# Patient Record
Sex: FEMALE | Race: BLACK OR AFRICAN AMERICAN | NOT HISPANIC OR LATINO
[De-identification: names, ages, dates, MRNs, and addresses within clinical notes are randomized per-mention and may not be internally consistent; named-entity substitution may affect disease eponyms.]

---

## 2017-01-10 ENCOUNTER — APPOINTMENT (OUTPATIENT)
Dept: NEPHROLOGY | Facility: CLINIC | Age: 77
End: 2017-01-10

## 2017-01-10 VITALS
HEART RATE: 80 BPM | SYSTOLIC BLOOD PRESSURE: 136 MMHG | HEIGHT: 61 IN | BODY MASS INDEX: 38.51 KG/M2 | DIASTOLIC BLOOD PRESSURE: 83 MMHG | WEIGHT: 204 LBS

## 2017-01-17 ENCOUNTER — APPOINTMENT (OUTPATIENT)
Dept: PODIATRY | Facility: CLINIC | Age: 77
End: 2017-01-17

## 2017-01-18 ENCOUNTER — RESULT REVIEW (OUTPATIENT)
Age: 77
End: 2017-01-18

## 2017-01-19 ENCOUNTER — APPOINTMENT (OUTPATIENT)
Dept: UROGYNECOLOGY | Facility: CLINIC | Age: 77
End: 2017-01-19

## 2017-01-19 VITALS
HEIGHT: 61 IN | BODY MASS INDEX: 38.14 KG/M2 | SYSTOLIC BLOOD PRESSURE: 130 MMHG | DIASTOLIC BLOOD PRESSURE: 78 MMHG | WEIGHT: 202 LBS

## 2017-01-19 LAB — CYTOLOGY CVX/VAG DOC THIN PREP: NORMAL

## 2017-01-19 RX ORDER — TROSPIUM CHLORIDE 20 MG/1
20 TABLET, FILM COATED ORAL
Qty: 180 | Refills: 0 | Status: DISCONTINUED | COMMUNITY
Start: 2017-01-19 | End: 2017-01-19

## 2017-01-20 LAB
APPEARANCE UR: NORMAL
BACTERIA URNS QL MICRO: ABNORMAL
BILIRUB UR QL STRIP: NEGATIVE
COLOR UR: YELLOW
GLUCOSE UR STRIP-MCNC: 100 MG/DL
HGB UR QL STRIP: NEGATIVE
KETONES UR STRIP-MCNC: NEGATIVE MG/DL
NITRITE UR QL STRIP: NEGATIVE
PH UR STRIP: 6.5
PROT UR STRIP-MCNC: >=300 MG/DL
SP GR UR STRIP: 1.02
URINE COMP/EPITH (NORTH): ABNORMAL
UROBILINOGEN UR STRIP-MCNC: 0.2 MG/DL
WBC URNS QL MICRO: NEGATIVE

## 2017-01-24 LAB — BACTERIA UR CULT: NORMAL

## 2017-03-02 ENCOUNTER — APPOINTMENT (OUTPATIENT)
Dept: UROGYNECOLOGY | Facility: CLINIC | Age: 77
End: 2017-03-02

## 2017-03-09 ENCOUNTER — APPOINTMENT (OUTPATIENT)
Dept: ENDOCRINOLOGY | Facility: CLINIC | Age: 77
End: 2017-03-09

## 2017-03-09 VITALS — WEIGHT: 196 LBS | BODY MASS INDEX: 31.5 KG/M2 | HEIGHT: 66 IN

## 2017-03-16 ENCOUNTER — APPOINTMENT (OUTPATIENT)
Dept: UROGYNECOLOGY | Facility: CLINIC | Age: 77
End: 2017-03-16

## 2017-03-16 VITALS — SYSTOLIC BLOOD PRESSURE: 130 MMHG | DIASTOLIC BLOOD PRESSURE: 78 MMHG

## 2017-03-21 ENCOUNTER — APPOINTMENT (OUTPATIENT)
Dept: PODIATRY | Facility: CLINIC | Age: 77
End: 2017-03-21

## 2017-03-29 ENCOUNTER — RX RENEWAL (OUTPATIENT)
Age: 77
End: 2017-03-29

## 2017-04-06 ENCOUNTER — APPOINTMENT (OUTPATIENT)
Dept: PODIATRY | Facility: CLINIC | Age: 77
End: 2017-04-06

## 2017-04-06 VITALS — HEIGHT: 66 IN | WEIGHT: 196 LBS | BODY MASS INDEX: 31.5 KG/M2

## 2017-04-18 ENCOUNTER — APPOINTMENT (OUTPATIENT)
Dept: NEPHROLOGY | Facility: CLINIC | Age: 77
End: 2017-04-18

## 2017-04-18 VITALS
HEIGHT: 66 IN | WEIGHT: 201 LBS | SYSTOLIC BLOOD PRESSURE: 153 MMHG | HEART RATE: 76 BPM | DIASTOLIC BLOOD PRESSURE: 80 MMHG | BODY MASS INDEX: 32.3 KG/M2

## 2017-04-19 ENCOUNTER — RESULT REVIEW (OUTPATIENT)
Age: 77
End: 2017-04-19

## 2017-04-21 ENCOUNTER — OTHER (OUTPATIENT)
Age: 77
End: 2017-04-21

## 2017-06-05 ENCOUNTER — RX RENEWAL (OUTPATIENT)
Age: 77
End: 2017-06-05

## 2017-06-15 ENCOUNTER — APPOINTMENT (OUTPATIENT)
Dept: ENDOCRINOLOGY | Facility: CLINIC | Age: 77
End: 2017-06-15

## 2017-06-15 VITALS
DIASTOLIC BLOOD PRESSURE: 76 MMHG | HEART RATE: 75 BPM | WEIGHT: 215 LBS | BODY MASS INDEX: 36.7 KG/M2 | HEIGHT: 64 IN | SYSTOLIC BLOOD PRESSURE: 124 MMHG

## 2017-06-15 DIAGNOSIS — E66.9 OBESITY, UNSPECIFIED: ICD-10-CM

## 2017-06-19 ENCOUNTER — OUTPATIENT (OUTPATIENT)
Dept: OUTPATIENT SERVICES | Facility: HOSPITAL | Age: 77
LOS: 1 days | Discharge: HOME | End: 2017-06-19

## 2017-06-19 ENCOUNTER — APPOINTMENT (OUTPATIENT)
Dept: PODIATRY | Facility: CLINIC | Age: 77
End: 2017-06-19

## 2017-06-19 VITALS
DIASTOLIC BLOOD PRESSURE: 77 MMHG | BODY MASS INDEX: 39.27 KG/M2 | HEIGHT: 60 IN | HEART RATE: 76 BPM | WEIGHT: 200 LBS | SYSTOLIC BLOOD PRESSURE: 130 MMHG

## 2017-06-19 DIAGNOSIS — J45.901 UNSPECIFIED ASTHMA WITH (ACUTE) EXACERBATION: ICD-10-CM

## 2017-06-19 DIAGNOSIS — M17.10 UNILATERAL PRIMARY OSTEOARTHRITIS, UNSPECIFIED KNEE: ICD-10-CM

## 2017-06-19 DIAGNOSIS — E87.5 HYPERKALEMIA: ICD-10-CM

## 2017-06-19 DIAGNOSIS — M70.70 OTHER BURSITIS OF HIP, UNSPECIFIED HIP: ICD-10-CM

## 2017-06-19 DIAGNOSIS — M19.90 UNSPECIFIED OSTEOARTHRITIS, UNSPECIFIED SITE: ICD-10-CM

## 2017-06-23 ENCOUNTER — APPOINTMENT (OUTPATIENT)
Dept: PODIATRY | Facility: CLINIC | Age: 77
End: 2017-06-23

## 2017-06-28 DIAGNOSIS — M79.671 PAIN IN RIGHT FOOT: ICD-10-CM

## 2017-06-28 DIAGNOSIS — B35.1 TINEA UNGUIUM: ICD-10-CM

## 2017-06-28 DIAGNOSIS — L84 CORNS AND CALLOSITIES: ICD-10-CM

## 2017-06-28 DIAGNOSIS — M79.672 PAIN IN LEFT FOOT: ICD-10-CM

## 2017-06-28 DIAGNOSIS — E11.8 TYPE 2 DIABETES MELLITUS WITH UNSPECIFIED COMPLICATIONS: ICD-10-CM

## 2017-06-28 DIAGNOSIS — L85.3 XEROSIS CUTIS: ICD-10-CM

## 2017-07-10 ENCOUNTER — EMERGENCY (EMERGENCY)
Facility: HOSPITAL | Age: 77
LOS: 0 days | Discharge: HOME | End: 2017-07-10
Admitting: INTERNAL MEDICINE

## 2017-07-10 DIAGNOSIS — R21 RASH AND OTHER NONSPECIFIC SKIN ERUPTION: ICD-10-CM

## 2017-07-10 DIAGNOSIS — M17.10 UNILATERAL PRIMARY OSTEOARTHRITIS, UNSPECIFIED KNEE: ICD-10-CM

## 2017-07-10 DIAGNOSIS — I25.10 ATHEROSCLEROTIC HEART DISEASE OF NATIVE CORONARY ARTERY WITHOUT ANGINA PECTORIS: ICD-10-CM

## 2017-07-10 DIAGNOSIS — Z79.4 LONG TERM (CURRENT) USE OF INSULIN: ICD-10-CM

## 2017-07-10 DIAGNOSIS — E87.5 HYPERKALEMIA: ICD-10-CM

## 2017-07-10 DIAGNOSIS — M70.70 OTHER BURSITIS OF HIP, UNSPECIFIED HIP: ICD-10-CM

## 2017-07-10 DIAGNOSIS — Z90.49 ACQUIRED ABSENCE OF OTHER SPECIFIED PARTS OF DIGESTIVE TRACT: ICD-10-CM

## 2017-07-10 DIAGNOSIS — Z96.651 PRESENCE OF RIGHT ARTIFICIAL KNEE JOINT: ICD-10-CM

## 2017-07-10 DIAGNOSIS — J45.901 UNSPECIFIED ASTHMA WITH (ACUTE) EXACERBATION: ICD-10-CM

## 2017-07-10 DIAGNOSIS — B02.9 ZOSTER WITHOUT COMPLICATIONS: ICD-10-CM

## 2017-07-10 DIAGNOSIS — J45.909 UNSPECIFIED ASTHMA, UNCOMPLICATED: ICD-10-CM

## 2017-07-10 DIAGNOSIS — M19.90 UNSPECIFIED OSTEOARTHRITIS, UNSPECIFIED SITE: ICD-10-CM

## 2017-07-10 DIAGNOSIS — E11.9 TYPE 2 DIABETES MELLITUS WITHOUT COMPLICATIONS: ICD-10-CM

## 2017-07-12 ENCOUNTER — OTHER (OUTPATIENT)
Age: 77
End: 2017-07-12

## 2017-07-14 ENCOUNTER — APPOINTMENT (OUTPATIENT)
Dept: UROGYNECOLOGY | Facility: CLINIC | Age: 77
End: 2017-07-14

## 2017-07-14 ENCOUNTER — OUTPATIENT (OUTPATIENT)
Dept: OUTPATIENT SERVICES | Facility: HOSPITAL | Age: 77
LOS: 1 days | Discharge: HOME | End: 2017-07-14

## 2017-07-14 DIAGNOSIS — E87.5 HYPERKALEMIA: ICD-10-CM

## 2017-07-14 DIAGNOSIS — M70.70 OTHER BURSITIS OF HIP, UNSPECIFIED HIP: ICD-10-CM

## 2017-07-14 DIAGNOSIS — E11.9 TYPE 2 DIABETES MELLITUS WITHOUT COMPLICATIONS: ICD-10-CM

## 2017-07-14 DIAGNOSIS — Z00.00 ENCOUNTER FOR GENERAL ADULT MEDICAL EXAMINATION WITHOUT ABNORMAL FINDINGS: ICD-10-CM

## 2017-07-14 DIAGNOSIS — M17.10 UNILATERAL PRIMARY OSTEOARTHRITIS, UNSPECIFIED KNEE: ICD-10-CM

## 2017-07-14 DIAGNOSIS — M19.90 UNSPECIFIED OSTEOARTHRITIS, UNSPECIFIED SITE: ICD-10-CM

## 2017-07-14 DIAGNOSIS — J45.901 UNSPECIFIED ASTHMA WITH (ACUTE) EXACERBATION: ICD-10-CM

## 2017-07-19 ENCOUNTER — APPOINTMENT (OUTPATIENT)
Dept: INTERNAL MEDICINE | Facility: CLINIC | Age: 77
End: 2017-07-19

## 2017-07-19 ENCOUNTER — RESULT REVIEW (OUTPATIENT)
Age: 77
End: 2017-07-19

## 2017-07-19 ENCOUNTER — OUTPATIENT (OUTPATIENT)
Dept: OUTPATIENT SERVICES | Facility: HOSPITAL | Age: 77
LOS: 1 days | Discharge: HOME | End: 2017-07-19

## 2017-07-19 VITALS
BODY MASS INDEX: 38.87 KG/M2 | HEART RATE: 71 BPM | HEIGHT: 60 IN | DIASTOLIC BLOOD PRESSURE: 56 MMHG | WEIGHT: 198 LBS | SYSTOLIC BLOOD PRESSURE: 84 MMHG

## 2017-07-19 DIAGNOSIS — Z87.898 PERSONAL HISTORY OF OTHER SPECIFIED CONDITIONS: ICD-10-CM

## 2017-07-19 DIAGNOSIS — G89.29 PAIN IN RIGHT FOOT: ICD-10-CM

## 2017-07-19 DIAGNOSIS — E87.5 HYPERKALEMIA: ICD-10-CM

## 2017-07-19 DIAGNOSIS — J45.901 UNSPECIFIED ASTHMA WITH (ACUTE) EXACERBATION: ICD-10-CM

## 2017-07-19 DIAGNOSIS — M79.671 PAIN IN RIGHT FOOT: ICD-10-CM

## 2017-07-19 DIAGNOSIS — M70.70 OTHER BURSITIS OF HIP, UNSPECIFIED HIP: ICD-10-CM

## 2017-07-19 DIAGNOSIS — M19.90 UNSPECIFIED OSTEOARTHRITIS, UNSPECIFIED SITE: ICD-10-CM

## 2017-07-19 DIAGNOSIS — M17.10 UNILATERAL PRIMARY OSTEOARTHRITIS, UNSPECIFIED KNEE: ICD-10-CM

## 2017-07-19 LAB — GLUCOSE SERPL-MCNC: 249 MG/DL

## 2017-07-20 ENCOUNTER — RX RENEWAL (OUTPATIENT)
Age: 77
End: 2017-07-20

## 2017-07-20 DIAGNOSIS — I10 ESSENTIAL (PRIMARY) HYPERTENSION: ICD-10-CM

## 2017-07-20 DIAGNOSIS — B02.21 POSTHERPETIC GENICULATE GANGLIONITIS: ICD-10-CM

## 2017-07-20 DIAGNOSIS — E11.9 TYPE 2 DIABETES MELLITUS WITHOUT COMPLICATIONS: ICD-10-CM

## 2017-07-20 DIAGNOSIS — N18.3 CHRONIC KIDNEY DISEASE, STAGE 3 (MODERATE): ICD-10-CM

## 2017-07-25 ENCOUNTER — APPOINTMENT (OUTPATIENT)
Dept: NEPHROLOGY | Facility: CLINIC | Age: 77
End: 2017-07-25

## 2017-07-25 ENCOUNTER — OUTPATIENT (OUTPATIENT)
Dept: OUTPATIENT SERVICES | Facility: HOSPITAL | Age: 77
LOS: 1 days | Discharge: HOME | End: 2017-07-25

## 2017-07-25 ENCOUNTER — EMERGENCY (EMERGENCY)
Facility: HOSPITAL | Age: 77
LOS: 0 days | Discharge: HOME | End: 2017-07-25
Admitting: INTERNAL MEDICINE

## 2017-07-25 VITALS
BODY MASS INDEX: 38.87 KG/M2 | HEIGHT: 60 IN | SYSTOLIC BLOOD PRESSURE: 147 MMHG | DIASTOLIC BLOOD PRESSURE: 78 MMHG | WEIGHT: 198 LBS | HEART RATE: 78 BPM

## 2017-07-25 DIAGNOSIS — N18.9 CHRONIC KIDNEY DISEASE, UNSPECIFIED: ICD-10-CM

## 2017-07-25 DIAGNOSIS — J45.909 UNSPECIFIED ASTHMA, UNCOMPLICATED: ICD-10-CM

## 2017-07-25 DIAGNOSIS — I12.9 HYPERTENSIVE CHRONIC KIDNEY DISEASE WITH STAGE 1 THROUGH STAGE 4 CHRONIC KIDNEY DISEASE, OR UNSPECIFIED CHRONIC KIDNEY DISEASE: ICD-10-CM

## 2017-07-25 DIAGNOSIS — J45.901 UNSPECIFIED ASTHMA WITH (ACUTE) EXACERBATION: ICD-10-CM

## 2017-07-25 DIAGNOSIS — M19.90 UNSPECIFIED OSTEOARTHRITIS, UNSPECIFIED SITE: ICD-10-CM

## 2017-07-25 DIAGNOSIS — E87.5 HYPERKALEMIA: ICD-10-CM

## 2017-07-25 DIAGNOSIS — M17.10 UNILATERAL PRIMARY OSTEOARTHRITIS, UNSPECIFIED KNEE: ICD-10-CM

## 2017-07-25 DIAGNOSIS — E11.9 TYPE 2 DIABETES MELLITUS WITHOUT COMPLICATIONS: ICD-10-CM

## 2017-07-25 DIAGNOSIS — Z90.49 ACQUIRED ABSENCE OF OTHER SPECIFIED PARTS OF DIGESTIVE TRACT: ICD-10-CM

## 2017-07-25 DIAGNOSIS — R10.31 RIGHT LOWER QUADRANT PAIN: ICD-10-CM

## 2017-07-25 DIAGNOSIS — I25.10 ATHEROSCLEROTIC HEART DISEASE OF NATIVE CORONARY ARTERY WITHOUT ANGINA PECTORIS: ICD-10-CM

## 2017-07-25 DIAGNOSIS — M70.70 OTHER BURSITIS OF HIP, UNSPECIFIED HIP: ICD-10-CM

## 2017-07-25 DIAGNOSIS — Z79.899 OTHER LONG TERM (CURRENT) DRUG THERAPY: ICD-10-CM

## 2017-07-25 DIAGNOSIS — E78.5 HYPERLIPIDEMIA, UNSPECIFIED: ICD-10-CM

## 2017-07-28 ENCOUNTER — OUTPATIENT (OUTPATIENT)
Dept: OUTPATIENT SERVICES | Facility: HOSPITAL | Age: 77
LOS: 1 days | Discharge: HOME | End: 2017-07-28

## 2017-07-28 ENCOUNTER — APPOINTMENT (OUTPATIENT)
Dept: INTERNAL MEDICINE | Facility: CLINIC | Age: 77
End: 2017-07-28

## 2017-07-28 VITALS
DIASTOLIC BLOOD PRESSURE: 85 MMHG | SYSTOLIC BLOOD PRESSURE: 147 MMHG | WEIGHT: 197 LBS | BODY MASS INDEX: 38.68 KG/M2 | HEIGHT: 60 IN | HEART RATE: 80 BPM

## 2017-07-28 DIAGNOSIS — E87.5 HYPERKALEMIA: ICD-10-CM

## 2017-07-28 DIAGNOSIS — N64.4 MASTODYNIA: ICD-10-CM

## 2017-07-28 DIAGNOSIS — I63.9 CEREBRAL INFARCTION, UNSPECIFIED: ICD-10-CM

## 2017-07-28 DIAGNOSIS — Z78.9 OTHER SPECIFIED HEALTH STATUS: ICD-10-CM

## 2017-07-28 DIAGNOSIS — M17.10 UNILATERAL PRIMARY OSTEOARTHRITIS, UNSPECIFIED KNEE: ICD-10-CM

## 2017-07-28 DIAGNOSIS — J45.901 UNSPECIFIED ASTHMA WITH (ACUTE) EXACERBATION: ICD-10-CM

## 2017-07-28 DIAGNOSIS — M70.70 OTHER BURSITIS OF HIP, UNSPECIFIED HIP: ICD-10-CM

## 2017-07-28 DIAGNOSIS — M19.90 UNSPECIFIED OSTEOARTHRITIS, UNSPECIFIED SITE: ICD-10-CM

## 2017-07-28 RX ORDER — GABAPENTIN 300 MG/1
300 CAPSULE ORAL TWICE DAILY
Qty: 60 | Refills: 0 | Status: DISCONTINUED | COMMUNITY
Start: 2017-07-19 | End: 2017-07-28

## 2017-07-31 ENCOUNTER — APPOINTMENT (OUTPATIENT)
Dept: INTERNAL MEDICINE | Facility: CLINIC | Age: 77
End: 2017-07-31

## 2017-08-01 DIAGNOSIS — N18.3 CHRONIC KIDNEY DISEASE, STAGE 3 (MODERATE): ICD-10-CM

## 2017-08-01 DIAGNOSIS — B02.9 ZOSTER WITHOUT COMPLICATIONS: ICD-10-CM

## 2017-08-01 DIAGNOSIS — I10 ESSENTIAL (PRIMARY) HYPERTENSION: ICD-10-CM

## 2017-08-01 DIAGNOSIS — M17.0 BILATERAL PRIMARY OSTEOARTHRITIS OF KNEE: ICD-10-CM

## 2017-08-01 DIAGNOSIS — E11.29 TYPE 2 DIABETES MELLITUS WITH OTHER DIABETIC KIDNEY COMPLICATION: ICD-10-CM

## 2017-08-14 ENCOUNTER — APPOINTMENT (OUTPATIENT)
Dept: PODIATRY | Facility: CLINIC | Age: 77
End: 2017-08-14

## 2017-08-14 ENCOUNTER — OUTPATIENT (OUTPATIENT)
Dept: OUTPATIENT SERVICES | Facility: HOSPITAL | Age: 77
LOS: 1 days | Discharge: HOME | End: 2017-08-14

## 2017-08-14 VITALS
BODY MASS INDEX: 39.07 KG/M2 | DIASTOLIC BLOOD PRESSURE: 77 MMHG | WEIGHT: 199 LBS | HEIGHT: 60 IN | SYSTOLIC BLOOD PRESSURE: 133 MMHG

## 2017-08-14 DIAGNOSIS — J45.901 UNSPECIFIED ASTHMA WITH (ACUTE) EXACERBATION: ICD-10-CM

## 2017-08-14 DIAGNOSIS — M70.70 OTHER BURSITIS OF HIP, UNSPECIFIED HIP: ICD-10-CM

## 2017-08-14 DIAGNOSIS — M19.90 UNSPECIFIED OSTEOARTHRITIS, UNSPECIFIED SITE: ICD-10-CM

## 2017-08-14 DIAGNOSIS — E87.5 HYPERKALEMIA: ICD-10-CM

## 2017-08-14 DIAGNOSIS — M17.10 UNILATERAL PRIMARY OSTEOARTHRITIS, UNSPECIFIED KNEE: ICD-10-CM

## 2017-08-16 DIAGNOSIS — B35.1 TINEA UNGUIUM: ICD-10-CM

## 2017-08-16 DIAGNOSIS — M79.672 PAIN IN LEFT FOOT: ICD-10-CM

## 2017-08-16 DIAGNOSIS — E11.8 TYPE 2 DIABETES MELLITUS WITH UNSPECIFIED COMPLICATIONS: ICD-10-CM

## 2017-08-16 DIAGNOSIS — M79.671 PAIN IN RIGHT FOOT: ICD-10-CM

## 2017-08-17 ENCOUNTER — OUTPATIENT (OUTPATIENT)
Dept: OUTPATIENT SERVICES | Facility: HOSPITAL | Age: 77
LOS: 1 days | Discharge: HOME | End: 2017-08-17

## 2017-08-17 ENCOUNTER — APPOINTMENT (OUTPATIENT)
Dept: ENDOCRINOLOGY | Facility: CLINIC | Age: 77
End: 2017-08-17

## 2017-08-17 DIAGNOSIS — M19.90 UNSPECIFIED OSTEOARTHRITIS, UNSPECIFIED SITE: ICD-10-CM

## 2017-08-17 DIAGNOSIS — E87.5 HYPERKALEMIA: ICD-10-CM

## 2017-08-17 DIAGNOSIS — J45.901 UNSPECIFIED ASTHMA WITH (ACUTE) EXACERBATION: ICD-10-CM

## 2017-08-17 DIAGNOSIS — M17.10 UNILATERAL PRIMARY OSTEOARTHRITIS, UNSPECIFIED KNEE: ICD-10-CM

## 2017-08-17 DIAGNOSIS — M70.70 OTHER BURSITIS OF HIP, UNSPECIFIED HIP: ICD-10-CM

## 2017-08-17 RX ORDER — GABAPENTIN 300 MG/1
300 CAPSULE ORAL 3 TIMES DAILY
Qty: 90 | Refills: 5 | Status: DISCONTINUED | COMMUNITY
Start: 2017-07-28 | End: 2017-08-17

## 2017-08-17 RX ORDER — DULAGLUTIDE 1.5 MG/.5ML
1.5 INJECTION, SOLUTION SUBCUTANEOUS
Qty: 1 | Refills: 5 | Status: DISCONTINUED | COMMUNITY
Start: 2017-06-15 | End: 2017-08-17

## 2017-08-22 DIAGNOSIS — E11.22 TYPE 2 DIABETES MELLITUS WITH DIABETIC CHRONIC KIDNEY DISEASE: ICD-10-CM

## 2017-08-22 DIAGNOSIS — E11.65 TYPE 2 DIABETES MELLITUS WITH HYPERGLYCEMIA: ICD-10-CM

## 2017-08-22 DIAGNOSIS — B02.23 POSTHERPETIC POLYNEUROPATHY: ICD-10-CM

## 2017-09-15 ENCOUNTER — OUTPATIENT (OUTPATIENT)
Dept: OUTPATIENT SERVICES | Facility: HOSPITAL | Age: 77
LOS: 1 days | Discharge: HOME | End: 2017-09-15

## 2017-09-15 DIAGNOSIS — E87.5 HYPERKALEMIA: ICD-10-CM

## 2017-09-15 DIAGNOSIS — J45.901 UNSPECIFIED ASTHMA WITH (ACUTE) EXACERBATION: ICD-10-CM

## 2017-09-15 DIAGNOSIS — M17.10 UNILATERAL PRIMARY OSTEOARTHRITIS, UNSPECIFIED KNEE: ICD-10-CM

## 2017-09-15 DIAGNOSIS — M19.90 UNSPECIFIED OSTEOARTHRITIS, UNSPECIFIED SITE: ICD-10-CM

## 2017-09-15 DIAGNOSIS — M70.70 OTHER BURSITIS OF HIP, UNSPECIFIED HIP: ICD-10-CM

## 2017-09-28 ENCOUNTER — RESULT REVIEW (OUTPATIENT)
Age: 77
End: 2017-09-28

## 2017-09-28 ENCOUNTER — APPOINTMENT (OUTPATIENT)
Dept: INTERNAL MEDICINE | Facility: CLINIC | Age: 77
End: 2017-09-28

## 2017-09-28 ENCOUNTER — OUTPATIENT (OUTPATIENT)
Dept: OUTPATIENT SERVICES | Facility: HOSPITAL | Age: 77
LOS: 1 days | Discharge: HOME | End: 2017-09-28

## 2017-09-28 DIAGNOSIS — Z86.73 PERSONAL HISTORY OF TRANSIENT ISCHEMIC ATTACK (TIA), AND CEREBRAL INFARCTION W/OUT RESIDUAL DEFICITS: ICD-10-CM

## 2017-09-28 DIAGNOSIS — M70.70 OTHER BURSITIS OF HIP, UNSPECIFIED HIP: ICD-10-CM

## 2017-09-28 DIAGNOSIS — Z80.0 FAMILY HISTORY OF MALIGNANT NEOPLASM OF DIGESTIVE ORGANS: ICD-10-CM

## 2017-09-28 DIAGNOSIS — J45.901 UNSPECIFIED ASTHMA WITH (ACUTE) EXACERBATION: ICD-10-CM

## 2017-09-28 DIAGNOSIS — Z80.3 FAMILY HISTORY OF MALIGNANT NEOPLASM OF BREAST: ICD-10-CM

## 2017-09-28 DIAGNOSIS — M19.90 UNSPECIFIED OSTEOARTHRITIS, UNSPECIFIED SITE: ICD-10-CM

## 2017-09-28 DIAGNOSIS — E87.5 HYPERKALEMIA: ICD-10-CM

## 2017-09-28 DIAGNOSIS — M17.10 UNILATERAL PRIMARY OSTEOARTHRITIS, UNSPECIFIED KNEE: ICD-10-CM

## 2017-09-28 DIAGNOSIS — Z80.52 FAMILY HISTORY OF MALIGNANT NEOPLASM OF BLADDER: ICD-10-CM

## 2017-10-12 ENCOUNTER — OUTPATIENT (OUTPATIENT)
Dept: OUTPATIENT SERVICES | Facility: HOSPITAL | Age: 77
LOS: 1 days | Discharge: HOME | End: 2017-10-12

## 2017-10-12 ENCOUNTER — APPOINTMENT (OUTPATIENT)
Dept: ENDOCRINOLOGY | Facility: CLINIC | Age: 77
End: 2017-10-12

## 2017-10-12 VITALS
HEIGHT: 64 IN | WEIGHT: 207 LBS | BODY MASS INDEX: 35.34 KG/M2 | SYSTOLIC BLOOD PRESSURE: 129 MMHG | HEART RATE: 67 BPM | DIASTOLIC BLOOD PRESSURE: 72 MMHG

## 2017-10-12 DIAGNOSIS — E06.3 AUTOIMMUNE THYROIDITIS: ICD-10-CM

## 2017-10-12 DIAGNOSIS — M17.10 UNILATERAL PRIMARY OSTEOARTHRITIS, UNSPECIFIED KNEE: ICD-10-CM

## 2017-10-12 DIAGNOSIS — M19.90 UNSPECIFIED OSTEOARTHRITIS, UNSPECIFIED SITE: ICD-10-CM

## 2017-10-12 DIAGNOSIS — J45.901 UNSPECIFIED ASTHMA WITH (ACUTE) EXACERBATION: ICD-10-CM

## 2017-10-12 DIAGNOSIS — M70.70 OTHER BURSITIS OF HIP, UNSPECIFIED HIP: ICD-10-CM

## 2017-10-12 DIAGNOSIS — E87.5 HYPERKALEMIA: ICD-10-CM

## 2017-10-12 DIAGNOSIS — E11.65 TYPE 2 DIABETES MELLITUS WITH HYPERGLYCEMIA: ICD-10-CM

## 2017-10-12 DIAGNOSIS — E78.00 PURE HYPERCHOLESTEROLEMIA, UNSPECIFIED: ICD-10-CM

## 2017-10-13 ENCOUNTER — OUTPATIENT (OUTPATIENT)
Dept: OUTPATIENT SERVICES | Facility: HOSPITAL | Age: 77
LOS: 1 days | Discharge: HOME | End: 2017-10-13

## 2017-10-13 ENCOUNTER — APPOINTMENT (OUTPATIENT)
Dept: INTERNAL MEDICINE | Facility: CLINIC | Age: 77
End: 2017-10-13

## 2017-10-13 VITALS
HEIGHT: 64 IN | DIASTOLIC BLOOD PRESSURE: 85 MMHG | HEART RATE: 76 BPM | SYSTOLIC BLOOD PRESSURE: 142 MMHG | BODY MASS INDEX: 35.17 KG/M2 | WEIGHT: 206 LBS

## 2017-10-13 DIAGNOSIS — E87.5 HYPERKALEMIA: ICD-10-CM

## 2017-10-13 DIAGNOSIS — E11.29 TYPE 2 DIABETES MELLITUS WITH OTHER DIABETIC KIDNEY COMPLICATION: ICD-10-CM

## 2017-10-13 DIAGNOSIS — M19.90 UNSPECIFIED OSTEOARTHRITIS, UNSPECIFIED SITE: ICD-10-CM

## 2017-10-13 DIAGNOSIS — Z23 ENCOUNTER FOR IMMUNIZATION: ICD-10-CM

## 2017-10-13 DIAGNOSIS — J45.901 UNSPECIFIED ASTHMA WITH (ACUTE) EXACERBATION: ICD-10-CM

## 2017-10-13 DIAGNOSIS — M70.70 OTHER BURSITIS OF HIP, UNSPECIFIED HIP: ICD-10-CM

## 2017-10-13 DIAGNOSIS — M54.41 LUMBAGO WITH SCIATICA, RIGHT SIDE: ICD-10-CM

## 2017-10-13 DIAGNOSIS — M17.10 UNILATERAL PRIMARY OSTEOARTHRITIS, UNSPECIFIED KNEE: ICD-10-CM

## 2017-10-13 DIAGNOSIS — E78.00 PURE HYPERCHOLESTEROLEMIA, UNSPECIFIED: ICD-10-CM

## 2017-10-13 DIAGNOSIS — N18.3 CHRONIC KIDNEY DISEASE, STAGE 3 (MODERATE): ICD-10-CM

## 2017-10-13 DIAGNOSIS — I10 ESSENTIAL (PRIMARY) HYPERTENSION: ICD-10-CM

## 2017-10-16 DIAGNOSIS — E11.22 TYPE 2 DIABETES MELLITUS WITH DIABETIC CHRONIC KIDNEY DISEASE: ICD-10-CM

## 2017-10-16 DIAGNOSIS — E11.65 TYPE 2 DIABETES MELLITUS WITH HYPERGLYCEMIA: ICD-10-CM

## 2017-10-16 DIAGNOSIS — E66.09 OTHER OBESITY DUE TO EXCESS CALORIES: ICD-10-CM

## 2017-10-20 LAB
APPEARANCE UR: NORMAL
BACTERIA URNS QL MICRO: ABNORMAL
BILIRUB UR QL STRIP: NEGATIVE
COLOR UR: YELLOW
GLUCOSE UR STRIP-MCNC: NEGATIVE MG/DL
HGB UR QL STRIP: NEGATIVE
KETONES UR STRIP-MCNC: NEGATIVE MG/DL
NITRITE UR QL STRIP: NEGATIVE
PH UR STRIP: 6
PROT UR STRIP-MCNC: 100 MG/DL
SP GR UR STRIP: 1.01
URINE COMP/EPITH (NORTH): ABNORMAL
UROBILINOGEN UR STRIP-MCNC: 0.2 MG/DL
WBC URNS QL MICRO: ABNORMAL
WBC URNS QL MICRO: ABNORMAL P/HPF

## 2017-10-23 ENCOUNTER — APPOINTMENT (OUTPATIENT)
Dept: PODIATRY | Facility: CLINIC | Age: 77
End: 2017-10-23

## 2017-10-23 ENCOUNTER — OUTPATIENT (OUTPATIENT)
Dept: OUTPATIENT SERVICES | Facility: HOSPITAL | Age: 77
LOS: 1 days | Discharge: HOME | End: 2017-10-23

## 2017-10-23 VITALS
BODY MASS INDEX: 35.85 KG/M2 | WEIGHT: 210 LBS | SYSTOLIC BLOOD PRESSURE: 150 MMHG | DIASTOLIC BLOOD PRESSURE: 78 MMHG | HEIGHT: 64 IN | HEART RATE: 62 BPM

## 2017-10-23 DIAGNOSIS — J45.901 UNSPECIFIED ASTHMA WITH (ACUTE) EXACERBATION: ICD-10-CM

## 2017-10-23 DIAGNOSIS — M19.90 UNSPECIFIED OSTEOARTHRITIS, UNSPECIFIED SITE: ICD-10-CM

## 2017-10-23 DIAGNOSIS — M17.10 UNILATERAL PRIMARY OSTEOARTHRITIS, UNSPECIFIED KNEE: ICD-10-CM

## 2017-10-23 DIAGNOSIS — E87.5 HYPERKALEMIA: ICD-10-CM

## 2017-10-23 DIAGNOSIS — M70.70 OTHER BURSITIS OF HIP, UNSPECIFIED HIP: ICD-10-CM

## 2017-10-25 ENCOUNTER — OUTPATIENT (OUTPATIENT)
Dept: OUTPATIENT SERVICES | Facility: HOSPITAL | Age: 77
LOS: 1 days | Discharge: HOME | End: 2017-10-25

## 2017-10-25 DIAGNOSIS — M54.40 LUMBAGO WITH SCIATICA, UNSPECIFIED SIDE: ICD-10-CM

## 2017-10-25 DIAGNOSIS — M19.90 UNSPECIFIED OSTEOARTHRITIS, UNSPECIFIED SITE: ICD-10-CM

## 2017-10-25 DIAGNOSIS — E87.5 HYPERKALEMIA: ICD-10-CM

## 2017-10-25 DIAGNOSIS — M70.70 OTHER BURSITIS OF HIP, UNSPECIFIED HIP: ICD-10-CM

## 2017-10-25 DIAGNOSIS — M17.10 UNILATERAL PRIMARY OSTEOARTHRITIS, UNSPECIFIED KNEE: ICD-10-CM

## 2017-10-25 DIAGNOSIS — J45.901 UNSPECIFIED ASTHMA WITH (ACUTE) EXACERBATION: ICD-10-CM

## 2017-11-16 ENCOUNTER — APPOINTMENT (OUTPATIENT)
Dept: INTERNAL MEDICINE | Facility: CLINIC | Age: 77
End: 2017-11-16

## 2017-11-16 ENCOUNTER — OUTPATIENT (OUTPATIENT)
Dept: OUTPATIENT SERVICES | Facility: HOSPITAL | Age: 77
LOS: 1 days | Discharge: HOME | End: 2017-11-16

## 2017-11-16 DIAGNOSIS — M19.90 UNSPECIFIED OSTEOARTHRITIS, UNSPECIFIED SITE: ICD-10-CM

## 2017-11-16 DIAGNOSIS — E87.5 HYPERKALEMIA: ICD-10-CM

## 2017-11-16 DIAGNOSIS — M17.10 UNILATERAL PRIMARY OSTEOARTHRITIS, UNSPECIFIED KNEE: ICD-10-CM

## 2017-11-16 DIAGNOSIS — J45.901 UNSPECIFIED ASTHMA WITH (ACUTE) EXACERBATION: ICD-10-CM

## 2017-11-16 DIAGNOSIS — M70.70 OTHER BURSITIS OF HIP, UNSPECIFIED HIP: ICD-10-CM

## 2017-12-11 ENCOUNTER — RX RENEWAL (OUTPATIENT)
Age: 77
End: 2017-12-11

## 2018-01-23 ENCOUNTER — APPOINTMENT (OUTPATIENT)
Dept: PODIATRY | Facility: CLINIC | Age: 78
End: 2018-01-23

## 2018-01-26 ENCOUNTER — APPOINTMENT (OUTPATIENT)
Dept: INTERNAL MEDICINE | Facility: CLINIC | Age: 78
End: 2018-01-26

## 2018-01-26 ENCOUNTER — OUTPATIENT (OUTPATIENT)
Dept: OUTPATIENT SERVICES | Facility: HOSPITAL | Age: 78
LOS: 1 days | Discharge: HOME | End: 2018-01-26

## 2018-01-26 VITALS
BODY MASS INDEX: 42.41 KG/M2 | SYSTOLIC BLOOD PRESSURE: 146 MMHG | DIASTOLIC BLOOD PRESSURE: 80 MMHG | HEART RATE: 73 BPM | WEIGHT: 216 LBS | HEIGHT: 60 IN

## 2018-01-26 RX ORDER — FLUCONAZOLE 150 MG/1
150 TABLET ORAL
Qty: 1 | Refills: 0 | Status: COMPLETED | COMMUNITY
Start: 2017-09-28 | End: 2017-09-29

## 2018-01-26 RX ORDER — PREGABALIN 100 MG/1
100 CAPSULE ORAL
Qty: 90 | Refills: 0 | Status: COMPLETED | COMMUNITY
Start: 2017-08-17

## 2018-01-26 RX ORDER — TERCONAZOLE 4 MG/G
0.4 CREAM VAGINAL DAILY
Qty: 45 | Refills: 0 | Status: COMPLETED | COMMUNITY
Start: 2017-09-28 | End: 2017-10-13

## 2018-01-26 RX ORDER — LANCING DEVICE/LANCETS
KIT MISCELLANEOUS
Qty: 1 | Refills: 0 | Status: COMPLETED | COMMUNITY
Start: 2017-09-15 | End: 2018-01-26

## 2018-01-26 RX ORDER — OXYCODONE AND ACETAMINOPHEN 2.5; 325 MG/1; MG/1
2.5-325 TABLET ORAL
Qty: 20 | Refills: 0 | Status: COMPLETED | COMMUNITY
Start: 2017-09-28 | End: 2017-10-08

## 2018-01-26 RX ORDER — AMLODIPINE BESYLATE AND BENAZEPRIL HYDROCHLORIDE 5; 10 MG/1; MG/1
5-10 CAPSULE ORAL
Qty: 90 | Refills: 3 | Status: DISCONTINUED | COMMUNITY
Start: 2017-03-29 | End: 2018-01-26

## 2018-01-30 DIAGNOSIS — I10 ESSENTIAL (PRIMARY) HYPERTENSION: ICD-10-CM

## 2018-01-30 DIAGNOSIS — E11.22 TYPE 2 DIABETES MELLITUS WITH DIABETIC CHRONIC KIDNEY DISEASE: ICD-10-CM

## 2018-01-30 DIAGNOSIS — E78.00 PURE HYPERCHOLESTEROLEMIA, UNSPECIFIED: ICD-10-CM

## 2018-01-30 DIAGNOSIS — M54.40 LUMBAGO WITH SCIATICA, UNSPECIFIED SIDE: ICD-10-CM

## 2018-02-04 DIAGNOSIS — E87.5 HYPERKALEMIA: ICD-10-CM

## 2018-02-04 DIAGNOSIS — M17.10 UNILATERAL PRIMARY OSTEOARTHRITIS, UNSPECIFIED KNEE: ICD-10-CM

## 2018-02-04 DIAGNOSIS — M70.70 OTHER BURSITIS OF HIP, UNSPECIFIED HIP: ICD-10-CM

## 2018-02-04 DIAGNOSIS — J45.901 UNSPECIFIED ASTHMA WITH (ACUTE) EXACERBATION: ICD-10-CM

## 2018-02-04 DIAGNOSIS — M19.90 UNSPECIFIED OSTEOARTHRITIS, UNSPECIFIED SITE: ICD-10-CM

## 2018-02-08 ENCOUNTER — APPOINTMENT (OUTPATIENT)
Dept: ENDOCRINOLOGY | Facility: CLINIC | Age: 78
End: 2018-02-08

## 2018-02-08 ENCOUNTER — OUTPATIENT (OUTPATIENT)
Dept: OUTPATIENT SERVICES | Facility: HOSPITAL | Age: 78
LOS: 1 days | Discharge: HOME | End: 2018-02-08

## 2018-02-08 VITALS
SYSTOLIC BLOOD PRESSURE: 172 MMHG | HEART RATE: 72 BPM | BODY MASS INDEX: 42.21 KG/M2 | HEIGHT: 60 IN | DIASTOLIC BLOOD PRESSURE: 83 MMHG | WEIGHT: 215 LBS

## 2018-02-08 DIAGNOSIS — E11.8 TYPE 2 DIABETES MELLITUS WITH UNSPECIFIED COMPLICATIONS: ICD-10-CM

## 2018-02-08 DIAGNOSIS — E11.65 TYPE 2 DIABETES MELLITUS WITH HYPERGLYCEMIA: ICD-10-CM

## 2018-02-12 DIAGNOSIS — E11.65 TYPE 2 DIABETES MELLITUS WITH HYPERGLYCEMIA: ICD-10-CM

## 2018-02-12 DIAGNOSIS — E11.22 TYPE 2 DIABETES MELLITUS WITH DIABETIC CHRONIC KIDNEY DISEASE: ICD-10-CM

## 2018-02-12 DIAGNOSIS — E66.01 MORBID (SEVERE) OBESITY DUE TO EXCESS CALORIES: ICD-10-CM

## 2018-04-11 ENCOUNTER — INPATIENT (INPATIENT)
Facility: HOSPITAL | Age: 78
LOS: 4 days | Discharge: HOME HEALTH | End: 2018-04-16
Attending: HOSPITALIST | Admitting: INTERNAL MEDICINE

## 2018-04-11 VITALS
TEMPERATURE: 99 F | DIASTOLIC BLOOD PRESSURE: 82 MMHG | HEART RATE: 100 BPM | SYSTOLIC BLOOD PRESSURE: 146 MMHG | OXYGEN SATURATION: 100 % | RESPIRATION RATE: 24 BRPM

## 2018-04-11 LAB
ALBUMIN SERPL ELPH-MCNC: 3.5 G/DL — SIGNIFICANT CHANGE UP (ref 3.5–5.2)
ALP SERPL-CCNC: 98 U/L — SIGNIFICANT CHANGE UP (ref 30–115)
ALT FLD-CCNC: 17 U/L — SIGNIFICANT CHANGE UP (ref 0–41)
ANION GAP SERPL CALC-SCNC: 14 MMOL/L — SIGNIFICANT CHANGE UP (ref 7–14)
APTT BLD: 27.1 SEC — SIGNIFICANT CHANGE UP (ref 27–39.2)
AST SERPL-CCNC: 27 U/L — SIGNIFICANT CHANGE UP (ref 0–41)
BILIRUB SERPL-MCNC: 0.2 MG/DL — SIGNIFICANT CHANGE UP (ref 0.2–1.2)
BUN SERPL-MCNC: 33 MG/DL — HIGH (ref 10–20)
CALCIUM SERPL-MCNC: 8 MG/DL — LOW (ref 8.5–10.1)
CHLORIDE SERPL-SCNC: 100 MMOL/L — SIGNIFICANT CHANGE UP (ref 98–110)
CK MB CFR SERPL CALC: <0.1 NG/ML — LOW (ref 0.6–6.3)
CK SERPL-CCNC: 657 U/L — HIGH (ref 0–225)
CO2 SERPL-SCNC: 24 MMOL/L — SIGNIFICANT CHANGE UP (ref 17–32)
CREAT SERPL-MCNC: 1.9 MG/DL — HIGH (ref 0.7–1.5)
GLUCOSE SERPL-MCNC: 153 MG/DL — HIGH (ref 70–99)
HCT VFR BLD CALC: 33.5 % — LOW (ref 37–47)
HGB BLD-MCNC: 10.6 G/DL — LOW (ref 12–16)
INR BLD: 1.11 RATIO — SIGNIFICANT CHANGE UP (ref 0.65–1.3)
MCHC RBC-ENTMCNC: 26.5 PG — LOW (ref 27–31)
MCHC RBC-ENTMCNC: 31.6 G/DL — LOW (ref 32–37)
MCV RBC AUTO: 83.8 FL — SIGNIFICANT CHANGE UP (ref 81–99)
NRBC # BLD: 0 /100 WBCS — SIGNIFICANT CHANGE UP (ref 0–0)
NT-PROBNP SERPL-SCNC: 779 PG/ML — HIGH (ref 0–300)
PLATELET # BLD AUTO: 229 K/UL — SIGNIFICANT CHANGE UP (ref 130–400)
POTASSIUM SERPL-MCNC: 4.2 MMOL/L — SIGNIFICANT CHANGE UP (ref 3.5–5)
POTASSIUM SERPL-SCNC: 4.2 MMOL/L — SIGNIFICANT CHANGE UP (ref 3.5–5)
PROT SERPL-MCNC: 6.7 G/DL — SIGNIFICANT CHANGE UP (ref 6–8)
PROTHROM AB SERPL-ACNC: 12 SEC — SIGNIFICANT CHANGE UP (ref 9.95–12.87)
RBC # BLD: 4 M/UL — LOW (ref 4.2–5.4)
RBC # FLD: 16.5 % — HIGH (ref 11.5–14.5)
SODIUM SERPL-SCNC: 138 MMOL/L — SIGNIFICANT CHANGE UP (ref 135–146)
TROPONIN T SERPL-MCNC: <0.01 NG/ML — SIGNIFICANT CHANGE UP
WBC # BLD: 8.51 K/UL — SIGNIFICANT CHANGE UP (ref 4.8–10.8)
WBC # FLD AUTO: 8.51 K/UL — SIGNIFICANT CHANGE UP (ref 4.8–10.8)

## 2018-04-11 RX ORDER — GABAPENTIN 400 MG/1
300 CAPSULE ORAL
Qty: 0 | Refills: 0 | Status: DISCONTINUED | OUTPATIENT
Start: 2018-04-11 | End: 2018-04-16

## 2018-04-11 RX ORDER — IPRATROPIUM/ALBUTEROL SULFATE 18-103MCG
3 AEROSOL WITH ADAPTER (GRAM) INHALATION ONCE
Qty: 0 | Refills: 0 | Status: COMPLETED | OUTPATIENT
Start: 2018-04-11 | End: 2018-04-11

## 2018-04-11 RX ORDER — TIOTROPIUM BROMIDE 18 UG/1
1 CAPSULE ORAL; RESPIRATORY (INHALATION) DAILY
Qty: 0 | Refills: 0 | Status: DISCONTINUED | OUTPATIENT
Start: 2018-04-11 | End: 2018-04-16

## 2018-04-11 RX ORDER — ALBUTEROL 90 UG/1
1 AEROSOL, METERED ORAL EVERY 4 HOURS
Qty: 0 | Refills: 0 | Status: DISCONTINUED | OUTPATIENT
Start: 2018-04-11 | End: 2018-04-16

## 2018-04-11 RX ORDER — PIOGLITAZONE HYDROCHLORIDE 15 MG/1
30 TABLET ORAL DAILY
Qty: 0 | Refills: 0 | Status: DISCONTINUED | OUTPATIENT
Start: 2018-04-11 | End: 2018-04-12

## 2018-04-11 RX ORDER — ASPIRIN/CALCIUM CARB/MAGNESIUM 324 MG
81 TABLET ORAL DAILY
Qty: 0 | Refills: 0 | Status: DISCONTINUED | OUTPATIENT
Start: 2018-04-11 | End: 2018-04-16

## 2018-04-11 RX ORDER — INSULIN LISPRO 100/ML
10 VIAL (ML) SUBCUTANEOUS ONCE
Qty: 0 | Refills: 0 | Status: COMPLETED | OUTPATIENT
Start: 2018-04-11 | End: 2018-04-11

## 2018-04-11 RX ORDER — LANOLIN ALCOHOL/MO/W.PET/CERES
5 CREAM (GRAM) TOPICAL AT BEDTIME
Qty: 0 | Refills: 0 | Status: DISCONTINUED | OUTPATIENT
Start: 2018-04-11 | End: 2018-04-16

## 2018-04-11 RX ORDER — HEPARIN SODIUM 5000 [USP'U]/ML
5000 INJECTION INTRAVENOUS; SUBCUTANEOUS EVERY 8 HOURS
Qty: 0 | Refills: 0 | Status: DISCONTINUED | OUTPATIENT
Start: 2018-04-11 | End: 2018-04-16

## 2018-04-11 RX ORDER — ATORVASTATIN CALCIUM 80 MG/1
40 TABLET, FILM COATED ORAL DAILY
Qty: 0 | Refills: 0 | Status: DISCONTINUED | OUTPATIENT
Start: 2018-04-11 | End: 2018-04-16

## 2018-04-11 RX ORDER — INSULIN GLARGINE 100 [IU]/ML
30 INJECTION, SOLUTION SUBCUTANEOUS AT BEDTIME
Qty: 0 | Refills: 0 | Status: DISCONTINUED | OUTPATIENT
Start: 2018-04-11 | End: 2018-04-13

## 2018-04-11 RX ORDER — IPRATROPIUM/ALBUTEROL SULFATE 18-103MCG
3 AEROSOL WITH ADAPTER (GRAM) INHALATION EVERY 6 HOURS
Qty: 0 | Refills: 0 | Status: DISCONTINUED | OUTPATIENT
Start: 2018-04-11 | End: 2018-04-16

## 2018-04-11 RX ORDER — INSULIN LISPRO 100/ML
15 VIAL (ML) SUBCUTANEOUS ONCE
Qty: 0 | Refills: 0 | Status: COMPLETED | OUTPATIENT
Start: 2018-04-11 | End: 2018-04-11

## 2018-04-11 RX ORDER — AMLODIPINE BESYLATE 2.5 MG/1
5 TABLET ORAL DAILY
Qty: 0 | Refills: 0 | Status: DISCONTINUED | OUTPATIENT
Start: 2018-04-11 | End: 2018-04-12

## 2018-04-11 RX ADMIN — HEPARIN SODIUM 5000 UNIT(S): 5000 INJECTION INTRAVENOUS; SUBCUTANEOUS at 22:22

## 2018-04-11 RX ADMIN — Medication 15 UNIT(S): at 19:42

## 2018-04-11 RX ADMIN — Medication 5 MILLIGRAM(S): at 22:22

## 2018-04-11 RX ADMIN — INSULIN GLARGINE 30 UNIT(S): 100 INJECTION, SOLUTION SUBCUTANEOUS at 22:22

## 2018-04-11 RX ADMIN — Medication 3 MILLILITER(S): at 20:20

## 2018-04-11 RX ADMIN — Medication 125 MILLIGRAM(S): at 12:27

## 2018-04-11 RX ADMIN — Medication 10 UNIT(S): at 21:20

## 2018-04-11 RX ADMIN — Medication 3 MILLILITER(S): at 12:27

## 2018-04-11 NOTE — H&P ADULT - ASSESSMENT
77 yo F presents with SOB    SOB on exertion  - likely 2/2 asthma exacerbation due to URI however pt recently returned from [facility]-will order duplex and d-dimer to r/o PE  - Wells score low  -Nebs prn    HTN  -c/w amlodipine    DM II  -c/w insulin, hold oral agents    DVT px    Admit to medicine    Carb Consistent Diet    Elevated CK  -f/u level    Anemia  -outpt workup 79 yo F presents with SOB    SOB on exertion  - likely 2/2 asthma exacerbation due to URI however pt recently returned from [facility]-will order duplex and d-dimer to r/o PE  - Wells score low  -Nebs prn    HTN  -c/w amlodipine    DM II  -c/w insulin, hold oral agents    DVT px    Admit to medicine    Carb Consistent Diet    Elevated CK  -f/u level    Anemia  -outpt workup    HOSPITALIST ADDENDUM    JULEE TOLLIVER  78y  Female      Patient is a 78y old  Female who presents with a chief complaint of SOB, acute in onset, progressively worsening x 3 days, associated with dry cough/ wheezing, inability to catch breath, associated with recent trip to [place], of note has been worn down since , no fevers/chills but + upper congestions/runny nose      REVIEW OF SYSTEMS:  +wheeze, dry cough, generalized weakness, LE edema, NO CP  All other review of systems negative    T(C): 36.2 (18 @ 05:30), Max: 36.8 (18 @ 18:01)  HR: 70 (18 @ 05:30) (70 - 104)  BP: 176/84 (18 @ 05:30) (174/75 - 176/84)  RR: 18 (18 @ 05:30) (18 - 20)  SpO2: 98% (18 @ 22:23) (98% - 98%)  Wt(kg): --Vital Signs Last 24 Hrs  T(C): 36.2 (2018 05:30), Max: 36.8 (2018 18:01)  T(F): 97.1 (2018 05:30), Max: 98.2 (2018 18:01)  HR: 70 (2018 05:30) (70 - 104)  BP: 176/84 (2018 05:30) (174/75 - 176/84)  BP(mean): --  RR: 18 (2018 05:30) (18 - 20)  SpO2: 98% (2018 22:23) (98% - 98%)        PHYSICAL EXAM:  GENERAL: NAD  PSYCH: no agitation, baseline mentation  NERVOUS SYSTEM:  Alert & Oriented X3, no new focal deficits  PULMONARY: diffuse b/l end expiratory wheeze lyrical including neck, no stridor  CARDIOVASCULAR: s1s2 no m/r/g  GI: Soft, Nontender, Nondistended; Bowel sounds present, rotund  EXTREMITIES:  subtle third space edema    Consultant(s) Notes Reviewed:  [x ] YES  [ ] NO    Discussed with Consultants/Other Providers [ x] YES     LABS                          10.4   12.36 )-----------( 233      ( 2018 12:09 )             31.9     04-12    140  |  99  |  44<H>  ----------------------------<  355<H>  3.9   |  22  |  2.2<H>    Ca    8.3<L>      2018 12:09    TPro  6.7  /  Alb  3.5  /  TBili  0.2  /  DBili  x   /  AST  27  /  ALT  17  /  AlkPhos  98  04-11        PT/INR - ( 2018 12:25 )   PT: 12.00 sec;   INR: 1.11 ratio         PTT - ( 2018 12:25 )  PTT:27.1 sec  Lactate Trend    CARDIAC MARKERS ( 2018 12:09 )  x     / <0.01 ng/mL / x     / x     / x      CARDIAC MARKERS ( 2018 12:25 )  x     / <0.01 ng/mL / 657 U/L / x     / <0.1 ng/mL      CAPILLARY BLOOD GLUCOSE  338 (2018 16:30)            RADIOLOGY & ADDITIONAL TESTS:    Imaging Personally Reviewed:  [ ] YES  [x ] NO    HEALTH ISSUES - PROBLEM Dx:    A/P  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, hyperreactive airway disease, former light smoker, here with acute exacerbation of underlying hyperreactive airway disease (asthma vs copd), mild volume overload, poorly controlled HTN    empiric abx, IV steroid, +symbicort, duonebs ATC +PRN  i/o, daily weights  ua/ lytes/ upc  uptitrate antihypertensives, agree with increasing CCB  dvt ppx  daily peak flow  ambulatory O2 77 yo F presents with SOB    SOB on exertion  - likely 2/2 asthma exacerbation due to URI however pt recently returned from [facility]-will order duplex and d-dimer to r/o PE  - Wells score low  -Nebs prn    HTN  -c/w amlodipine    DM II  -c/w insulin, hold oral agents    DVT px    Admit to medicine    Carb Consistent Diet    Elevated CK  -f/u level    Anemia  -outpt workup    HOSPITALIST ADDENDUM    JULEE TOLLIVER  78y  Female      Patient is a 78y old  Female who presents with a chief complaint of SOB, acute in onset, progressively worsening x 3 days, associated with dry cough/ wheezing, inability to catch breath, associated with recent trip to [place], of note has been worn down since , no fevers/chills but + upper congestions/runny nose      REVIEW OF SYSTEMS:  +wheeze, dry cough, generalized weakness, LE edema, NO CP  All other review of systems negative    T(C): 36.2 (18 @ 05:30), Max: 36.8 (18 @ 18:01)  HR: 70 (18 @ 05:30) (70 - 104)  BP: 176/84 (18 @ 05:30) (174/75 - 176/84)  RR: 18 (18 @ 05:30) (18 - 20)  SpO2: 98% (18 @ 22:23) (98% - 98%)  Wt(kg): --Vital Signs Last 24 Hrs  T(C): 36.2 (2018 05:30), Max: 36.8 (2018 18:01)  T(F): 97.1 (2018 05:30), Max: 98.2 (2018 18:01)  HR: 70 (2018 05:30) (70 - 104)  BP: 176/84 (2018 05:30) (174/75 - 176/84)  BP(mean): --  RR: 18 (2018 05:30) (18 - 20)  SpO2: 98% (2018 22:23) (98% - 98%)        PHYSICAL EXAM:  GENERAL: NAD  PSYCH: no agitation, baseline mentation  NERVOUS SYSTEM:  Alert & Oriented X3, no new focal deficits  PULMONARY: diffuse b/l end expiratory wheeze lyrical including neck, no stridor  CARDIOVASCULAR: s1s2 no m/r/g  GI: Soft, Nontender, Nondistended; Bowel sounds present, rotund  EXTREMITIES:  subtle third space edema    Consultant(s) Notes Reviewed:  [x ] YES  [ ] NO    Discussed with Consultants/Other Providers [ x] YES     LABS                          10.4   12.36 )-----------( 233      ( 2018 12:09 )             31.9     04-12    140  |  99  |  44<H>  ----------------------------<  355<H>  3.9   |  22  |  2.2<H>    Ca    8.3<L>      2018 12:09    TPro  6.7  /  Alb  3.5  /  TBili  0.2  /  DBili  x   /  AST  27  /  ALT  17  /  AlkPhos  98  04-11        PT/INR - ( 2018 12:25 )   PT: 12.00 sec;   INR: 1.11 ratio         PTT - ( 2018 12:25 )  PTT:27.1 sec  Lactate Trend    CARDIAC MARKERS ( 2018 12:09 )  x     / <0.01 ng/mL / x     / x     / x      CARDIAC MARKERS ( 2018 12:25 )  x     / <0.01 ng/mL / 657 U/L / x     / <0.1 ng/mL      CAPILLARY BLOOD GLUCOSE  338 (2018 16:30)            RADIOLOGY & ADDITIONAL TESTS:    Imaging Personally Reviewed:  [ ] YES  [x ] NO    HEALTH ISSUES - PROBLEM Dx:    A/P  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

## 2018-04-11 NOTE — H&P ADULT - PMH
Asthma, unspecified asthma severity, unspecified whether complicated, unspecified whether persistent    Hypertension affecting pregnancy, antepartum    Type 2 diabetes mellitus without complication, with long-term current use of insulin

## 2018-04-11 NOTE — H&P ADULT - NSHPPHYSICALEXAM_GEN_ALL_CORE
ICU Vital Signs Last 24 Hrs  T(C): 37.3 (11 Apr 2018 11:48), Max: 37.3 (11 Apr 2018 11:48)  T(F): 99.1 (11 Apr 2018 11:48), Max: 99.1 (11 Apr 2018 11:48)  HR: 100 (11 Apr 2018 11:48) (100 - 100)  BP: 146/82 (11 Apr 2018 11:48) (146/82 - 146/82)  RR: 24 (11 Apr 2018 11:48) (24 - 24)  SpO2: 100% (11 Apr 2018 11:48) (100% - 100%)    PHYSICAL EXAM:  GENERAL: NAD, speaks in full sentences, no signs of respiratory distress  HEAD:  Atraumatic, Normocephalic  EYES: EOMI, PERRLA, conjunctiva and sclera clear  NECK: Supple, No JVD  CHEST/LUNG: Clear to auscultation bilaterally; No wheeze; No crackles; No accessory muscles used  HEART: Regular rate and rhythm; No murmurs;   ABDOMEN: Soft, Nontender, Nondistended; Bowel sounds present; No guarding  EXTREMITIES:  2+ Peripheral Pulses, No cyanosis or edema  PSYCH: AAOx3  NEUROLOGY: non-focal  SKIN: No rashes or lesions    *wheezing upper airway not lungs

## 2018-04-11 NOTE — H&P ADULT - HISTORY OF PRESENT ILLNESS
77 yo F presents for evaluation of SOB. Pt states she has cough, sputum production, runny nose for several days. She ran out albuterol inhaler. States she has asthma but never had formal testing. Returned from her sisters  in [place] several days ago. No chest pain, muscle aches, seasonl allergies, trouble handling secretions

## 2018-04-11 NOTE — H&P ADULT - ATTENDING COMMENTS
Patient seen and examined independently of resident. My addendum can be found in assessment section. Case discussed with housestaff, nursing and patient

## 2018-04-11 NOTE — ED PROVIDER NOTE - PROGRESS NOTE DETAILS
patient w improved air exchange though with persistent wheezing and speaking in partial sentneces. Will continue to treat and reassess, likely admit.

## 2018-04-11 NOTE — ED PROVIDER NOTE - OBJECTIVE STATEMENT
77 y/o female with hx Asthma, HTN, HDL presents to the ED c/o " I feel very SOB, I think my asthma is acting up and I ran out of my inhaler. I am coughing and SOB since yesterday." no CP/ leg pain/ leg swelling

## 2018-04-11 NOTE — ED PROVIDER NOTE - ATTENDING CONTRIBUTION TO CARE
78yf hx asthma, DM, HTN, HLD + past intubation presents w SOB. Began yesterday, worsening, associated w chest tightness, consistent in quality with her usual asthma. + cough. + fever last night to 102. No HA, syncope. No abdominal pain, n/v/d. No urinary sx. Exam: WDWN + resp distress, speaking in one word sentences, sitting up, no retractions, no accessory muscle use. NCAT neck FROM no ttp and no meningismus. Skin warm and dry. HEENT WNL, MMM. S1S2 RRR, equal pulses b/l, lungs with poor air exchange b/l, + wheezes b/l, abdomen soft NTND, no r/g, no CVAT, no suprapubic ttp. mild b/l LE edema. A&O, normal strength and sensation, neuro nonfocal. A/P - sob, likely asthma exac - r/o pna, cardiac etiology - labs, imaging, nebs, steroids, reassess.

## 2018-04-11 NOTE — H&P ADULT - NSHPLABSRESULTS_GEN_ALL_CORE
10.6   8.51  )-----------( 229      ( 11 Apr 2018 12:25 )             33.5   04-11    138  |  100  |  33<H>  ----------------------------<  153<H>  4.2   |  24  |  1.9<H>    Ca    8.0<L>      11 Apr 2018 12:25    TPro  6.7  /  Alb  3.5  /  TBili  0.2  /  DBili  x   /  AST  27  /  ALT  17  /  AlkPhos  98  04-11  CARDIAC MARKERS ( 11 Apr 2018 12:25 )  x     / <0.01 ng/mL / 657 U/L / x     / <0.1 ng/mL

## 2018-04-12 ENCOUNTER — TRANSCRIPTION ENCOUNTER (OUTPATIENT)
Age: 78
End: 2018-04-12

## 2018-04-12 LAB
ANION GAP SERPL CALC-SCNC: 19 MMOL/L — HIGH (ref 7–14)
BASOPHILS # BLD AUTO: 0.01 K/UL — SIGNIFICANT CHANGE UP (ref 0–0.2)
BASOPHILS NFR BLD AUTO: 0.1 % — SIGNIFICANT CHANGE UP (ref 0–1)
BUN SERPL-MCNC: 44 MG/DL — HIGH (ref 10–20)
CALCIUM SERPL-MCNC: 8.3 MG/DL — LOW (ref 8.5–10.1)
CHLORIDE SERPL-SCNC: 99 MMOL/L — SIGNIFICANT CHANGE UP (ref 98–110)
CO2 SERPL-SCNC: 22 MMOL/L — SIGNIFICANT CHANGE UP (ref 17–32)
CREAT SERPL-MCNC: 2.2 MG/DL — HIGH (ref 0.7–1.5)
D DIMER BLD IA.RAPID-MCNC: 559 NG/ML DDU — HIGH (ref 0–230)
EOSINOPHIL # BLD AUTO: 0 K/UL — SIGNIFICANT CHANGE UP (ref 0–0.7)
EOSINOPHIL NFR BLD AUTO: 0 % — SIGNIFICANT CHANGE UP (ref 0–8)
ESTIMATED AVERAGE GLUCOSE: 186 MG/DL — HIGH (ref 68–114)
GLUCOSE SERPL-MCNC: 355 MG/DL — HIGH (ref 70–99)
HBA1C BLD-MCNC: 8.1 % — HIGH (ref 4–5.6)
HCT VFR BLD CALC: 31.9 % — LOW (ref 37–47)
HGB BLD-MCNC: 10.4 G/DL — LOW (ref 12–16)
IMM GRANULOCYTES NFR BLD AUTO: 0.5 % — HIGH (ref 0.1–0.3)
LYMPHOCYTES # BLD AUTO: 1.54 K/UL — SIGNIFICANT CHANGE UP (ref 1.2–3.4)
LYMPHOCYTES # BLD AUTO: 12.5 % — LOW (ref 20.5–51.1)
MCHC RBC-ENTMCNC: 26.8 PG — LOW (ref 27–31)
MCHC RBC-ENTMCNC: 32.6 G/DL — SIGNIFICANT CHANGE UP (ref 32–37)
MCV RBC AUTO: 82.2 FL — SIGNIFICANT CHANGE UP (ref 81–99)
MONOCYTES # BLD AUTO: 1.17 K/UL — HIGH (ref 0.1–0.6)
MONOCYTES NFR BLD AUTO: 9.5 % — HIGH (ref 1.7–9.3)
NEUTROPHILS # BLD AUTO: 9.58 K/UL — HIGH (ref 1.4–6.5)
NEUTROPHILS NFR BLD AUTO: 77.4 % — HIGH (ref 42.2–75.2)
NRBC # BLD: 0 /100 WBCS — SIGNIFICANT CHANGE UP (ref 0–0)
PLATELET # BLD AUTO: 233 K/UL — SIGNIFICANT CHANGE UP (ref 130–400)
POTASSIUM SERPL-MCNC: 3.9 MMOL/L — SIGNIFICANT CHANGE UP (ref 3.5–5)
POTASSIUM SERPL-SCNC: 3.9 MMOL/L — SIGNIFICANT CHANGE UP (ref 3.5–5)
RBC # BLD: 3.88 M/UL — LOW (ref 4.2–5.4)
RBC # FLD: 16.5 % — HIGH (ref 11.5–14.5)
SODIUM SERPL-SCNC: 140 MMOL/L — SIGNIFICANT CHANGE UP (ref 135–146)
TROPONIN T SERPL-MCNC: <0.01 NG/ML — SIGNIFICANT CHANGE UP
WBC # BLD: 12.36 K/UL — HIGH (ref 4.8–10.8)
WBC # FLD AUTO: 12.36 K/UL — HIGH (ref 4.8–10.8)

## 2018-04-12 RX ORDER — BUDESONIDE AND FORMOTEROL FUMARATE DIHYDRATE 160; 4.5 UG/1; UG/1
2 AEROSOL RESPIRATORY (INHALATION)
Qty: 0 | Refills: 0 | Status: DISCONTINUED | OUTPATIENT
Start: 2018-04-12 | End: 2018-04-16

## 2018-04-12 RX ORDER — FUROSEMIDE 40 MG
40 TABLET ORAL DAILY
Qty: 0 | Refills: 0 | Status: DISCONTINUED | OUTPATIENT
Start: 2018-04-12 | End: 2018-04-13

## 2018-04-12 RX ORDER — DEXTROSE 50 % IN WATER 50 %
12.5 SYRINGE (ML) INTRAVENOUS ONCE
Qty: 0 | Refills: 0 | Status: DISCONTINUED | OUTPATIENT
Start: 2018-04-12 | End: 2018-04-16

## 2018-04-12 RX ORDER — AMLODIPINE BESYLATE 2.5 MG/1
10 TABLET ORAL DAILY
Qty: 0 | Refills: 0 | Status: DISCONTINUED | OUTPATIENT
Start: 2018-04-12 | End: 2018-04-16

## 2018-04-12 RX ORDER — GLUCAGON INJECTION, SOLUTION 0.5 MG/.1ML
1 INJECTION, SOLUTION SUBCUTANEOUS ONCE
Qty: 0 | Refills: 0 | Status: DISCONTINUED | OUTPATIENT
Start: 2018-04-12 | End: 2018-04-16

## 2018-04-12 RX ORDER — INSULIN LISPRO 100/ML
10 VIAL (ML) SUBCUTANEOUS
Qty: 0 | Refills: 0 | Status: DISCONTINUED | OUTPATIENT
Start: 2018-04-12 | End: 2018-04-13

## 2018-04-12 RX ORDER — AZITHROMYCIN 500 MG/1
500 TABLET, FILM COATED ORAL DAILY
Qty: 0 | Refills: 0 | Status: DISCONTINUED | OUTPATIENT
Start: 2018-04-12 | End: 2018-04-16

## 2018-04-12 RX ORDER — DEXTROSE 50 % IN WATER 50 %
1 SYRINGE (ML) INTRAVENOUS ONCE
Qty: 0 | Refills: 0 | Status: DISCONTINUED | OUTPATIENT
Start: 2018-04-12 | End: 2018-04-16

## 2018-04-12 RX ORDER — LIDOCAINE 4 G/100G
1 CREAM TOPICAL DAILY
Qty: 0 | Refills: 0 | Status: DISCONTINUED | OUTPATIENT
Start: 2018-04-12 | End: 2018-04-13

## 2018-04-12 RX ORDER — SODIUM CHLORIDE 9 MG/ML
1000 INJECTION, SOLUTION INTRAVENOUS
Qty: 0 | Refills: 0 | Status: DISCONTINUED | OUTPATIENT
Start: 2018-04-12 | End: 2018-04-16

## 2018-04-12 RX ORDER — DEXTROSE 50 % IN WATER 50 %
25 SYRINGE (ML) INTRAVENOUS ONCE
Qty: 0 | Refills: 0 | Status: DISCONTINUED | OUTPATIENT
Start: 2018-04-12 | End: 2018-04-16

## 2018-04-12 RX ORDER — INSULIN GLARGINE 100 [IU]/ML
30 INJECTION, SOLUTION SUBCUTANEOUS AT BEDTIME
Qty: 0 | Refills: 0 | Status: DISCONTINUED | OUTPATIENT
Start: 2018-04-12 | End: 2018-04-12

## 2018-04-12 RX ADMIN — HEPARIN SODIUM 5000 UNIT(S): 5000 INJECTION INTRAVENOUS; SUBCUTANEOUS at 05:58

## 2018-04-12 RX ADMIN — Medication 3 MILLILITER(S): at 20:03

## 2018-04-12 RX ADMIN — ATORVASTATIN CALCIUM 40 MILLIGRAM(S): 80 TABLET, FILM COATED ORAL at 12:32

## 2018-04-12 RX ADMIN — AMLODIPINE BESYLATE 5 MILLIGRAM(S): 2.5 TABLET ORAL at 05:57

## 2018-04-12 RX ADMIN — HEPARIN SODIUM 5000 UNIT(S): 5000 INJECTION INTRAVENOUS; SUBCUTANEOUS at 15:03

## 2018-04-12 RX ADMIN — Medication 81 MILLIGRAM(S): at 12:29

## 2018-04-12 RX ADMIN — HEPARIN SODIUM 5000 UNIT(S): 5000 INJECTION INTRAVENOUS; SUBCUTANEOUS at 22:18

## 2018-04-12 RX ADMIN — GABAPENTIN 300 MILLIGRAM(S): 400 CAPSULE ORAL at 17:53

## 2018-04-12 RX ADMIN — Medication 40 MILLIGRAM(S): at 20:05

## 2018-04-12 RX ADMIN — Medication 3 MILLILITER(S): at 13:19

## 2018-04-12 RX ADMIN — Medication 40 MILLIGRAM(S): at 17:55

## 2018-04-12 RX ADMIN — Medication 50 MILLIGRAM(S): at 12:28

## 2018-04-12 RX ADMIN — Medication 600 MILLIGRAM(S): at 05:57

## 2018-04-12 RX ADMIN — INSULIN GLARGINE 30 UNIT(S): 100 INJECTION, SOLUTION SUBCUTANEOUS at 22:17

## 2018-04-12 RX ADMIN — AMLODIPINE BESYLATE 10 MILLIGRAM(S): 2.5 TABLET ORAL at 20:10

## 2018-04-12 RX ADMIN — Medication 600 MILLIGRAM(S): at 17:53

## 2018-04-12 RX ADMIN — Medication 10 UNIT(S): at 12:23

## 2018-04-12 RX ADMIN — AZITHROMYCIN 500 MILLIGRAM(S): 500 TABLET, FILM COATED ORAL at 15:02

## 2018-04-12 RX ADMIN — Medication 10 UNIT(S): at 17:49

## 2018-04-12 RX ADMIN — GABAPENTIN 300 MILLIGRAM(S): 400 CAPSULE ORAL at 05:57

## 2018-04-12 RX ADMIN — Medication 3 MILLILITER(S): at 06:24

## 2018-04-12 NOTE — DISCHARGE NOTE ADULT - HOSPITAL COURSE
77 yo female was admitted for asthma exacerbation and treated with steroid and respiratory treatments. Patient was also found to have proteinuria and was seen by nephrology and requires further investigation as an outpatient. The patients respiratory status improved and she was stabilized for discharge. 79 yo female was admitted for asthma exacerbation and treated with steroid and respiratory treatments. Patient was also found to have proteinuria and was seen by nephrology and requires further investigation as an outpatient. The patients respiratory status improved and she was stabilized for discharge.

## 2018-04-12 NOTE — CONSULT NOTE ADULT - SUBJECTIVE AND OBJECTIVE BOX
NEPHROLOGY CONSULTATION NOTE    Patient is a 78y Female whom presented to the hospital with SOB likely 2/2 asthma exacernation, a/w productive cough and runny nose for few days. Other significant PMH includes DMII on insulin, HTN and asthma. Renal consulted for possible history of nephrotic syndrome with baseline crt 1.9. In 2016, pt was found to have.        PAST MEDICAL & SURGICAL HISTORY:  Type 2 diabetes mellitus without complication, with long-term current use of insulin  Hypertension affecting pregnancy, antepartum  Asthma, unspecified asthma severity, unspecified whether complicated, unspecified whether persistent    Allergies:  No Known Allergies    Home Medications Reviewed  Home Medications:  amLODIPine 5 mg oral tablet: 1 tab(s) orally once a day (11 Apr 2018 15:19)  aspirin 81 mg oral tablet: 1 tab(s) orally once a day (11 Apr 2018 15:19)  atorvastatin 40 mg oral tablet: 1 tab(s) orally once a day (11 Apr 2018 15:19)  gabapentin 600 mg oral tablet: 600 milligram(s) orally every 8 hours (11 Apr 2018 15:19)  Melatonin 5 mg oral tablet: 1 tab(s) orally once a day (at bedtime) (11 Apr 2018 15:19)  pioglitazone 30 mg oral tablet: 1 tab(s) orally once a day (11 Apr 2018 15:19)  Toujeo SoloStar 300 units/mL subcutaneous solution: 30 unit(s) subcutaneous (11 Apr 2018 15:19)  Victoza 18 mg/3 mL subcutaneous solution: 1 application subcutaneous once a day (11 Apr 2018 15:19)    Hospital Medications:   MEDICATIONS  (STANDING):  ALBUTerol    90 MICROgram(s) HFA Inhaler 1 Puff(s) Inhalation every 4 hours  ALBUTerol/ipratropium for Nebulization 3 milliLiter(s) Nebulizer every 6 hours  amLODIPine   Tablet 5 milliGRAM(s) Oral daily  aspirin  chewable 81 milliGRAM(s) Oral daily  atorvastatin Oral Tab/Cap - Peds 40 milliGRAM(s) Oral daily  azithromycin   Tablet 500 milliGRAM(s) Oral daily  buDESOnide 160 MICROgram(s)/formoterol 4.5 MICROgram(s) Inhaler 2 Puff(s) Inhalation two times a day  dextrose 5%. 1000 milliLiter(s) (50 mL/Hr) IV Continuous <Continuous>  dextrose 50% Injectable 12.5 Gram(s) IV Push once  dextrose 50% Injectable 25 Gram(s) IV Push once  dextrose 50% Injectable 25 Gram(s) IV Push once  gabapentin 300 milliGRAM(s) Oral two times a day  guaiFENesin  milliGRAM(s) Oral every 12 hours  heparin  Injectable 5000 Unit(s) SubCutaneous every 8 hours  insulin glargine Injectable (LANTUS) 30 Unit(s) SubCutaneous at bedtime  insulin lispro Injectable (HumaLOG) 10 Unit(s) SubCutaneous before breakfast  insulin lispro Injectable (HumaLOG) 10 Unit(s) SubCutaneous before lunch  insulin lispro Injectable (HumaLOG) 10 Unit(s) SubCutaneous before dinner  lidocaine   Patch 1 Patch Transdermal daily  melatonin 5 milliGRAM(s) Oral at bedtime  methylPREDNISolone sodium succinate Injectable 40 milliGRAM(s) IV Push every 12 hours  tiotropium 18 MICROgram(s) Capsule 1 Capsule(s) Inhalation daily      SOCIAL HISTORY:  Denies ETOH,Smoking,   FAMILY HISTORY:        REVIEW OF SYSTEMS:  CONSTITUTIONAL: No weakness, fevers or chills  EYES/ENT: No visual changes;  No vertigo or throat pain   NECK: No pain or stiffness  RESPIRATORY: No cough, wheezing, hemoptysis; No shortness of breath  CARDIOVASCULAR: No chest pain or palpitations.  GASTROINTESTINAL: No abdominal or epigastric pain. No nausea, vomiting, or hematemesis; No diarrhea or constipation. No melena or hematochezia.  GENITOURINARY: No dysuria, frequency, foamy urine, urinary urgency, incontinence or hematuria  NEUROLOGICAL: No numbness or weakness  SKIN: No itching, burning, rashes, or lesions   VASCULAR: No bilateral lower extremity edema.   All other review of systems is negative unless indicated above.    VITALS:  T(F): 97.1 (04-12-18 @ 05:30), Max: 98.2 (04-11-18 @ 18:01)  HR: 70 (04-12-18 @ 05:30)  BP: 176/84 (04-12-18 @ 05:30)  RR: 18 (04-12-18 @ 05:30)  SpO2: 98% (04-11-18 @ 22:23)    Height (cm): 160 (04-12 @ 11:31)  Weight (kg): 80 (04-12 @ 11:31)  BMI (kg/m2): 31.3 (04-12 @ 11:31)  BSA (m2): 1.83 (04-12 @ 11:31)    I&O's Detail        PHYSICAL EXAM:  Constitutional: NAD  HEENT: anicteric sclera, oropharynx clear, MMM  Neck: No JVD  Respiratory: CTAB, no wheezes, rales or rhonchi  Cardiovascular: S1, S2, RRR  Gastrointestinal: BS+, soft, NT/ND  Extremities: No cyanosis or clubbing. No peripheral edema  Neurological: A/O x 3, no focal deficits  Psychiatric: Normal mood, normal affect  : No CVA tenderness. No alcala.   Skin: No rashes  Vascular Access:    LABS:  04-12    140  |  99  |  44<H>  ----------------------------<  355<H>  3.9   |  22  |  2.2<H>    Ca    8.3<L>      12 Apr 2018 12:09    TPro  6.7  /  Alb  3.5  /  TBili  0.2  /  DBili      /  AST  27  /  ALT  17  /  AlkPhos  98  04-11    Creatinine Trend: 2.2 <--, 1.9 <--                        10.6   8.51  )-----------( 229      ( 11 Apr 2018 12:25 )             33.5     Urine Studies:              RADIOLOGY & ADDITIONAL STUDIES: NEPHROLOGY CONSULTATION NOTE    Patient is a 78y Female whom presented to the hospital with SOB likely 2/2 asthma exacernation, a/w productive cough and runny nose for few days. Other significant PMH includes DMII on insulin, HTN and asthma. Renal consulted for possible history of nephrotic syndrome with baseline crt 1.9. In 2016, pt was found to have an elevated pro/crt. Currently, she is still short of breath. Her leg swelling is getting worse, L>R. No history of kidney problem when she was young, no history of HIV or autoimmune disease.         PAST MEDICAL & SURGICAL HISTORY:  Type 2 diabetes mellitus without complication, with long-term current use of insulin  Hypertension affecting pregnancy, antepartum  Asthma, unspecified asthma severity, unspecified whether complicated, unspecified whether persistent    Allergies:  No Known Allergies    Home Medications Reviewed  Home Medications:  amLODIPine 5 mg oral tablet: 1 tab(s) orally once a day (11 Apr 2018 15:19)  aspirin 81 mg oral tablet: 1 tab(s) orally once a day (11 Apr 2018 15:19)  atorvastatin 40 mg oral tablet: 1 tab(s) orally once a day (11 Apr 2018 15:19)  gabapentin 600 mg oral tablet: 600 milligram(s) orally every 8 hours (11 Apr 2018 15:19)  Melatonin 5 mg oral tablet: 1 tab(s) orally once a day (at bedtime) (11 Apr 2018 15:19)  pioglitazone 30 mg oral tablet: 1 tab(s) orally once a day (11 Apr 2018 15:19)  Toujeo SoloStar 300 units/mL subcutaneous solution: 30 unit(s) subcutaneous (11 Apr 2018 15:19)  Victoza 18 mg/3 mL subcutaneous solution: 1 application subcutaneous once a day (11 Apr 2018 15:19)    Hospital Medications:   MEDICATIONS  (STANDING):  ALBUTerol    90 MICROgram(s) HFA Inhaler 1 Puff(s) Inhalation every 4 hours  ALBUTerol/ipratropium for Nebulization 3 milliLiter(s) Nebulizer every 6 hours  amLODIPine   Tablet 5 milliGRAM(s) Oral daily  aspirin  chewable 81 milliGRAM(s) Oral daily  atorvastatin Oral Tab/Cap - Peds 40 milliGRAM(s) Oral daily  azithromycin   Tablet 500 milliGRAM(s) Oral daily  buDESOnide 160 MICROgram(s)/formoterol 4.5 MICROgram(s) Inhaler 2 Puff(s) Inhalation two times a day  dextrose 5%. 1000 milliLiter(s) (50 mL/Hr) IV Continuous <Continuous>  dextrose 50% Injectable 12.5 Gram(s) IV Push once  dextrose 50% Injectable 25 Gram(s) IV Push once  dextrose 50% Injectable 25 Gram(s) IV Push once  gabapentin 300 milliGRAM(s) Oral two times a day  guaiFENesin  milliGRAM(s) Oral every 12 hours  heparin  Injectable 5000 Unit(s) SubCutaneous every 8 hours  insulin glargine Injectable (LANTUS) 30 Unit(s) SubCutaneous at bedtime  insulin lispro Injectable (HumaLOG) 10 Unit(s) SubCutaneous before breakfast  insulin lispro Injectable (HumaLOG) 10 Unit(s) SubCutaneous before lunch  insulin lispro Injectable (HumaLOG) 10 Unit(s) SubCutaneous before dinner  lidocaine   Patch 1 Patch Transdermal daily  melatonin 5 milliGRAM(s) Oral at bedtime  methylPREDNISolone sodium succinate Injectable 40 milliGRAM(s) IV Push every 12 hours  tiotropium 18 MICROgram(s) Capsule 1 Capsule(s) Inhalation daily      SOCIAL HISTORY:  Denies ETOH,Smoking,   FAMILY HISTORY:        REVIEW OF SYSTEMS:  CONSTITUTIONAL: no fevers or chills  EYES/ENT: No visual changes;  No vertigo or throat pain   NECK: No pain or stiffness  RESPIRATORY: No cough, wheezing, hemoptysis; No shortness of breath  CARDIOVASCULAR: No chest pain or palpitations.  GASTROINTESTINAL: No abdominal or epigastric pain. No nausea, vomiting, or hematemesis; No diarrhea or constipation. No melena or hematochezia.  GENITOURINARY: No dysuria, frequency, foamy urine, urinary urgency, incontinence or hematuria  NEUROLOGICAL: No numbness or weakness  SKIN: No itching, burning, rashes, or lesions   VASCULAR: No bilateral lower extremity edema.   All other review of systems is negative unless indicated above.    VITALS:  T(F): 97.1 (04-12-18 @ 05:30), Max: 98.2 (04-11-18 @ 18:01)  HR: 70 (04-12-18 @ 05:30)  BP: 176/84 (04-12-18 @ 05:30)  RR: 18 (04-12-18 @ 05:30)  SpO2: 98% (04-11-18 @ 22:23)    Height (cm): 160 (04-12 @ 11:31)  Weight (kg): 80 (04-12 @ 11:31)  BMI (kg/m2): 31.3 (04-12 @ 11:31)  BSA (m2): 1.83 (04-12 @ 11:31)    I&O's Detail        PHYSICAL EXAM:  Constitutional: obese  HEENT: anicteric sclera, oropharynx clear, MMM  Neck: No JVD  Respiratory: CTAB, no wheezes, rales or rhonchi  Cardiovascular: S1, S2, RRR  Gastrointestinal: BS+, soft, NT/ND  Extremities: No cyanosis or clubbing. No peripheral edema  Neurological: A/O x 3, no focal deficits  Psychiatric: Normal mood, normal affect  : No CVA tenderness. No alcala.   Skin: No rashes  Vascular Access:    LABS:  04-12    140  |  99  |  44<H>  ----------------------------<  355<H>  3.9   |  22  |  2.2<H>    Ca    8.3<L>      12 Apr 2018 12:09    TPro  6.7  /  Alb  3.5  /  TBili  0.2  /  DBili      /  AST  27  /  ALT  17  /  AlkPhos  98  04-11    Creatinine Trend: 2.2 <--, 1.9 <--                        10.6   8.51  )-----------( 229      ( 11 Apr 2018 12:25 )             33.5     Urine Studies:              RADIOLOGY & ADDITIONAL STUDIES: NEPHROLOGY CONSULTATION NOTE    Patient is a 78y Female whom presented to the hospital with SOB likely 2/2 asthma exacernation, a/w productive cough and runny nose for few days. Other significant PMH includes DMII on insulin, HTN and asthma. Renal consulted for possible history of nephrotic syndrome with baseline crt 1.9. In 2016, pt was found to have an elevated pro/crt. Currently, she is still short of breath. Her leg swelling is getting worse, L>R.      PAST MEDICAL & SURGICAL HISTORY:  Type 2 diabetes mellitus without complication, with long-term current use of insulin  Hypertension affecting pregnancy, antepartum  Asthma, unspecified asthma severity, unspecified whether complicated, unspecified whether persistent    Allergies:  No Known Allergies    Home Medications Reviewed  Home Medications:  amLODIPine 5 mg oral tablet: 1 tab(s) orally once a day (11 Apr 2018 15:19)  aspirin 81 mg oral tablet: 1 tab(s) orally once a day (11 Apr 2018 15:19)  atorvastatin 40 mg oral tablet: 1 tab(s) orally once a day (11 Apr 2018 15:19)  gabapentin 600 mg oral tablet: 600 milligram(s) orally every 8 hours (11 Apr 2018 15:19)  Melatonin 5 mg oral tablet: 1 tab(s) orally once a day (at bedtime) (11 Apr 2018 15:19)  pioglitazone 30 mg oral tablet: 1 tab(s) orally once a day (11 Apr 2018 15:19)  Toujeo SoloStar 300 units/mL subcutaneous solution: 30 unit(s) subcutaneous (11 Apr 2018 15:19)  Victoza 18 mg/3 mL subcutaneous solution: 1 application subcutaneous once a day (11 Apr 2018 15:19)    Hospital Medications:   MEDICATIONS  (STANDING):  ALBUTerol    90 MICROgram(s) HFA Inhaler 1 Puff(s) Inhalation every 4 hours  ALBUTerol/ipratropium for Nebulization 3 milliLiter(s) Nebulizer every 6 hours  amLODIPine   Tablet 5 milliGRAM(s) Oral daily  aspirin  chewable 81 milliGRAM(s) Oral daily  atorvastatin Oral Tab/Cap - Peds 40 milliGRAM(s) Oral daily  azithromycin   Tablet 500 milliGRAM(s) Oral daily  buDESOnide 160 MICROgram(s)/formoterol 4.5 MICROgram(s) Inhaler 2 Puff(s) Inhalation two times a day  dextrose 5%. 1000 milliLiter(s) (50 mL/Hr) IV Continuous <Continuous>  dextrose 50% Injectable 12.5 Gram(s) IV Push once  dextrose 50% Injectable 25 Gram(s) IV Push once  dextrose 50% Injectable 25 Gram(s) IV Push once  gabapentin 300 milliGRAM(s) Oral two times a day  guaiFENesin  milliGRAM(s) Oral every 12 hours  heparin  Injectable 5000 Unit(s) SubCutaneous every 8 hours  insulin glargine Injectable (LANTUS) 30 Unit(s) SubCutaneous at bedtime  insulin lispro Injectable (HumaLOG) 10 Unit(s) SubCutaneous before breakfast  insulin lispro Injectable (HumaLOG) 10 Unit(s) SubCutaneous before lunch  insulin lispro Injectable (HumaLOG) 10 Unit(s) SubCutaneous before dinner  lidocaine   Patch 1 Patch Transdermal daily  melatonin 5 milliGRAM(s) Oral at bedtime  methylPREDNISolone sodium succinate Injectable 40 milliGRAM(s) IV Push every 12 hours  tiotropium 18 MICROgram(s) Capsule 1 Capsule(s) Inhalation daily      SOCIAL HISTORY:  Denies ETOH,Smoking,   FAMILY HISTORY:        REVIEW OF SYSTEMS:  CONSTITUTIONAL: no fevers or chills  EYES/ENT: No visual changes;  No vertigo or throat pain   NECK: No pain or stiffness  RESPIRATORY: +SOB  CARDIOVASCULAR: No chest pain or palpitations.  GASTROINTESTINAL: No abdominal or epigastric pain. No nausea, vomiting, or hematemesis; No diarrhea or constipation. No melena or hematochezia.  GENITOURINARY: +foamy urine  NEUROLOGICAL: No numbness or weakness  SKIN: No itching, burning, rashes, or lesions   VASCULAR:+b/l lower extremity edema.   All other review of systems is negative unless indicated above.    VITALS:  T(F): 97.1 (04-12-18 @ 05:30), Max: 98.2 (04-11-18 @ 18:01)  HR: 70 (04-12-18 @ 05:30)  BP: 176/84 (04-12-18 @ 05:30)  RR: 18 (04-12-18 @ 05:30)  SpO2: 98% (04-11-18 @ 22:23)    Height (cm): 160 (04-12 @ 11:31)  Weight (kg): 80 (04-12 @ 11:31)  BMI (kg/m2): 31.3 (04-12 @ 11:31)  BSA (m2): 1.83 (04-12 @ 11:31)    I&O's Detail        PHYSICAL EXAM:  Constitutional: obese  HEENT: anicteric sclera, oropharynx clear, MMM  Neck: No JVD  Respiratory: CTAB, no wheezes, rales or rhonchi  Cardiovascular: S1, S2, RRR  Gastrointestinal: BS+, soft, NT/ND  Extremities: No cyanosis or clubbing. No peripheral edema  Neurological: A/O x 3, no focal deficits  Psychiatric: Normal mood, normal affect  : No CVA tenderness. No alcala.   Skin: No rashes  Vascular Access:    LABS:  04-12    140  |  99  |  44<H>  ----------------------------<  355<H>  3.9   |  22  |  2.2<H>    Ca    8.3<L>      12 Apr 2018 12:09    TPro  6.7  /  Alb  3.5  /  TBili  0.2  /  DBili      /  AST  27  /  ALT  17  /  AlkPhos  98  04-11    Creatinine Trend: 2.2 <--, 1.9 <--                        10.6   8.51  )-----------( 229      ( 11 Apr 2018 12:25 )             33.5     Urine Studies:              RADIOLOGY & ADDITIONAL STUDIES: NEPHROLOGY CONSULTATION NOTE    Patient is a 78y Female whom presented to the hospital with SOB likely 2/2 asthma exacernation, a/w productive cough and runny nose for few days. Other significant PMH includes DMII on insulin, HTN and asthma. Renal consulted for possible history of nephrotic syndrome with baseline crt 1.9. In 2016, pt was found to have an elevated pro/crt. Currently, she is still short of breath. Her leg swelling is getting worse, L>R. She has DM >20yrs. Strong family history of DM.       PAST MEDICAL & SURGICAL HISTORY:  Type 2 diabetes mellitus without complication, with long-term current use of insulin  Hypertension affecting pregnancy, antepartum  Asthma, unspecified asthma severity, unspecified whether complicated, unspecified whether persistent    Allergies:  No Known Allergies      Home Medications:  amLODIPine 5 mg oral tablet: 1 tab(s) orally once a day (11 Apr 2018 15:19)  aspirin 81 mg oral tablet: 1 tab(s) orally once a day (11 Apr 2018 15:19)  atorvastatin 40 mg oral tablet: 1 tab(s) orally once a day (11 Apr 2018 15:19)  gabapentin 600 mg oral tablet: 600 milligram(s) orally every 8 hours (11 Apr 2018 15:19)  Melatonin 5 mg oral tablet: 1 tab(s) orally once a day (at bedtime) (11 Apr 2018 15:19)  pioglitazone 30 mg oral tablet: 1 tab(s) orally once a day (11 Apr 2018 15:19)  Toujeo SoloStar 300 units/mL subcutaneous solution: 30 unit(s) subcutaneous (11 Apr 2018 15:19)  Victoza 18 mg/3 mL subcutaneous solution: 1 application subcutaneous once a day (11 Apr 2018 15:19)    Hospital Medications:   MEDICATIONS  (STANDING):  ALBUTerol    90 MICROgram(s) HFA Inhaler 1 Puff(s) Inhalation every 4 hours  ALBUTerol/ipratropium for Nebulization 3 milliLiter(s) Nebulizer every 6 hours  amLODIPine   Tablet 5 milliGRAM(s) Oral daily  aspirin  chewable 81 milliGRAM(s) Oral daily  atorvastatin Oral Tab/Cap - Peds 40 milliGRAM(s) Oral daily  azithromycin   Tablet 500 milliGRAM(s) Oral daily  buDESOnide 160 MICROgram(s)/formoterol 4.5 MICROgram(s) Inhaler 2 Puff(s) Inhalation two times a day  dextrose 5%. 1000 milliLiter(s) (50 mL/Hr) IV Continuous <Continuous>  dextrose 50% Injectable 12.5 Gram(s) IV Push once  dextrose 50% Injectable 25 Gram(s) IV Push once  dextrose 50% Injectable 25 Gram(s) IV Push once  gabapentin 300 milliGRAM(s) Oral two times a day  guaiFENesin  milliGRAM(s) Oral every 12 hours  heparin  Injectable 5000 Unit(s) SubCutaneous every 8 hours  insulin glargine Injectable (LANTUS) 30 Unit(s) SubCutaneous at bedtime  insulin lispro Injectable (HumaLOG) 10 Unit(s) SubCutaneous before breakfast  insulin lispro Injectable (HumaLOG) 10 Unit(s) SubCutaneous before lunch  insulin lispro Injectable (HumaLOG) 10 Unit(s) SubCutaneous before dinner  lidocaine   Patch 1 Patch Transdermal daily  melatonin 5 milliGRAM(s) Oral at bedtime  methylPREDNISolone sodium succinate Injectable 40 milliGRAM(s) IV Push every 12 hours  tiotropium 18 MICROgram(s) Capsule 1 Capsule(s) Inhalation daily      SOCIAL HISTORY:  Denies ETOH,Smoking,   FAMILY HISTORY:        REVIEW OF SYSTEMS:  CONSTITUTIONAL: no fevers or chills  EYES/ENT: No visual changes;  No vertigo or throat pain   NECK: No pain or stiffness  RESPIRATORY: +SOB  CARDIOVASCULAR: No chest pain or palpitations.  GASTROINTESTINAL: No abdominal or epigastric pain. No nausea, vomiting, or hematemesis; No diarrhea or constipation. No melena or hematochezia.  GENITOURINARY: +foamy urine  NEUROLOGICAL: No numbness or weakness  SKIN: No itching, burning, rashes, or lesions   VASCULAR:+b/l lower extremity edema.   All other review of systems is negative unless indicated above.    VITALS:  T(F): 97.1 (04-12-18 @ 05:30), Max: 98.2 (04-11-18 @ 18:01)  HR: 70 (04-12-18 @ 05:30)  BP: 176/84 (04-12-18 @ 05:30)  RR: 18 (04-12-18 @ 05:30)  SpO2: 98% (04-11-18 @ 22:23)    Height (cm): 160 (04-12 @ 11:31)  Weight (kg): 80 (04-12 @ 11:31)  BMI (kg/m2): 31.3 (04-12 @ 11:31)  BSA (m2): 1.83 (04-12 @ 11:31)    I&O's Detail        PHYSICAL EXAM:  Constitutional: obese  HEENT: anicteric sclera, oropharynx clear, MMM  Neck: No JVD  Respiratory: CTAB, no wheezes, rales or rhonchi  Cardiovascular: S1, S2, RRR  Gastrointestinal: BS+, soft, NT/ND  Extremities: No cyanosis or clubbing. No peripheral edema  Neurological: A/O x 3, no focal deficits  Psychiatric: Normal mood, normal affect  : No CVA tenderness. No alcala.   Skin: No rashes  Vascular Access:    LABS:  04-12    140  |  99  |  44<H>  ----------------------------<  355<H>  3.9   |  22  |  2.2<H>    Ca    8.3<L>      12 Apr 2018 12:09    TPro  6.7  /  Alb  3.5  /  TBili  0.2  /  DBili      /  AST  27  /  ALT  17  /  AlkPhos  98  04-11    Creatinine Trend: 2.2 <--, 1.9 <--                        10.6   8.51  )-----------( 229      ( 11 Apr 2018 12:25 )             33.5     Urine Studies:              RADIOLOGY & ADDITIONAL STUDIES: NEPHROLOGY CONSULTATION NOTE    Patient is a 78y Female whom presented to the hospital with SOB likely 2/2 asthma exacernation, a/w productive cough and runny nose for few days. Other significant PMH includes DMII on insulin, HTN and asthma. Renal consulted for possible history of nephrotic syndrome with baseline crt 1.9. In 2016, pt was found to have an elevated pro/crt. Currently, she is still short of breath. Her leg swelling is getting worse, L>R. She has DM >20yrs. Strong family history of DM.       PAST MEDICAL & SURGICAL HISTORY:  Type 2 diabetes mellitus without complication, with long-term current use of insulin  Hypertension affecting pregnancy, antepartum  Asthma, unspecified asthma severity, unspecified whether complicated, unspecified whether persistent    Allergies:  No Known Allergies      Home Medications:  amLODIPine 5 mg oral tablet: 1 tab(s) orally once a day (11 Apr 2018 15:19)  aspirin 81 mg oral tablet: 1 tab(s) orally once a day (11 Apr 2018 15:19)  atorvastatin 40 mg oral tablet: 1 tab(s) orally once a day (11 Apr 2018 15:19)  gabapentin 600 mg oral tablet: 600 milligram(s) orally every 8 hours (11 Apr 2018 15:19)  Melatonin 5 mg oral tablet: 1 tab(s) orally once a day (at bedtime) (11 Apr 2018 15:19)  pioglitazone 30 mg oral tablet: 1 tab(s) orally once a day (11 Apr 2018 15:19)  Toujeo SoloStar 300 units/mL subcutaneous solution: 30 unit(s) subcutaneous (11 Apr 2018 15:19)  Victoza 18 mg/3 mL subcutaneous solution: 1 application subcutaneous once a day (11 Apr 2018 15:19)    Hospital Medications:   MEDICATIONS  (STANDING):  ALBUTerol    90 MICROgram(s) HFA Inhaler 1 Puff(s) Inhalation every 4 hours  ALBUTerol/ipratropium for Nebulization 3 milliLiter(s) Nebulizer every 6 hours  amLODIPine   Tablet 5 milliGRAM(s) Oral daily  aspirin  chewable 81 milliGRAM(s) Oral daily  atorvastatin Oral Tab/Cap - Peds 40 milliGRAM(s) Oral daily  azithromycin   Tablet 500 milliGRAM(s) Oral daily  buDESOnide 160 MICROgram(s)/formoterol 4.5 MICROgram(s) Inhaler 2 Puff(s) Inhalation two times a day  dextrose 5%. 1000 milliLiter(s) (50 mL/Hr) IV Continuous <Continuous>  dextrose 50% Injectable 12.5 Gram(s) IV Push once  dextrose 50% Injectable 25 Gram(s) IV Push once  dextrose 50% Injectable 25 Gram(s) IV Push once  gabapentin 300 milliGRAM(s) Oral two times a day  guaiFENesin  milliGRAM(s) Oral every 12 hours  heparin  Injectable 5000 Unit(s) SubCutaneous every 8 hours  insulin glargine Injectable (LANTUS) 30 Unit(s) SubCutaneous at bedtime  insulin lispro Injectable (HumaLOG) 10 Unit(s) SubCutaneous before breakfast  insulin lispro Injectable (HumaLOG) 10 Unit(s) SubCutaneous before lunch  insulin lispro Injectable (HumaLOG) 10 Unit(s) SubCutaneous before dinner  lidocaine   Patch 1 Patch Transdermal daily  melatonin 5 milliGRAM(s) Oral at bedtime  methylPREDNISolone sodium succinate Injectable 40 milliGRAM(s) IV Push every 12 hours  tiotropium 18 MICROgram(s) Capsule 1 Capsule(s) Inhalation daily      SOCIAL HISTORY:  Denies ETOH,Smoking,   FAMILY HISTORY:        REVIEW OF SYSTEMS:  CONSTITUTIONAL: no fevers or chills  NECK: No pain or stiffness  RESPIRATORY: +SOB  CARDIOVASCULAR: No chest pain or palpitations.  GASTROINTESTINAL: No abdominal or epigastric pain. No nausea, vomiting, or hematemesis; GENITOURINARY: +foamy urine  NEUROLOGICAL: No numbness or weakness  SKIN: No itching, burning, rashes, or lesions   VASCULAR:+b/l lower extremity edema.   All other review of systems is negative unless indicated above.    VITALS:  T(F): 97.1 (04-12-18 @ 05:30), Max: 98.2 (04-11-18 @ 18:01)  HR: 70 (04-12-18 @ 05:30)  BP: 176/84 (04-12-18 @ 05:30)  RR: 18 (04-12-18 @ 05:30)  SpO2: 98% (04-11-18 @ 22:23)    Height (cm): 160 (04-12 @ 11:31)  Weight (kg): 80 (04-12 @ 11:31)  BMI (kg/m2): 31.3 (04-12 @ 11:31)  BSA (m2): 1.83 (04-12 @ 11:31)    I&O's Detail        PHYSICAL EXAM:  Constitutional: obese  HEENT: anicteric sclera,, MMM  Neck: No JVD  Respiratory: b/l wheezes, prolonged expiration  Cardiovascular: S1, S2, RRR  Gastrointestinal: BS+, soft, NT/ND  Extremities: 1+ peripheral edema  Neurological: A/O x 3,   Psychiatric: Normal mood, normal affect  : No CVA tenderness. No alcala.       LABS:  04-12    140  |  99  |  44<H>  ----------------------------<  355<H>  3.9   |  22  |  2.2<H>  Creatinine Trend: 2.2<--, 1.9<--    Ca    8.3<L>      12 Apr 2018 12:09    TPro  6.7  /  Alb  3.5  /  TBili  0.2  /  DBili      /  AST  27  /  ALT  17  /  AlkPhos  98  04-11    Creatinine Trend: 2.2 <--, 1.9 <--                        10.6   8.51  )-----------( 229      ( 11 Apr 2018 12:25 )             33.5     Urine Studies:              RADIOLOGY & ADDITIONAL STUDIES:

## 2018-04-12 NOTE — PROVIDER CONTACT NOTE (OTHER) - SITUATION
Informed MD of repeat FS, no further intervention @ this time.
Pt. bp is 176/84, p-87. Pt. is due for amlodipine. Pt. to receive bp med. No further interventions at this time as per MD. RN will continue to monitor.
Pt. fs is 582 after receiving 15u of lispro. Pt. bp is 182/79. MD Godoy #[id number] made aware. RN is waiting further interventions.

## 2018-04-12 NOTE — DISCHARGE NOTE ADULT - CARE PROVIDERS DIRECT ADDRESSES
,[email or web address]_Unknown

## 2018-04-12 NOTE — PROGRESS NOTE ADULT - SUBJECTIVE AND OBJECTIVE BOX
LENGTH OF HOSPITAL STAY: 1d    CHIEF COMPLAINT:   Patient is a 78y old  Female who presents with a chief complaint of SOB    79 yo F with PMH of JUDAH due to cancer, diabetes, hypertension, hypercholesterolemia, postherpetic neuralgia, abdominal hernia and leg swelling presents for evaluation of SOB. Pt states she has cough, sputum production, runny nose for several days. She ran out albuterol inhaler. States she has asthma but never had formal testing. Returned from her sisters  in [place] several days ago. No chest pain, muscle aches, seasonal allergies, trouble handling secretions.     Overnight events:    No acute events overnight. Patient was seen at bedside this morning. Patient was in no acute distress. Patient reported to minor lower back pain. Patient was emotional during interview due to the recent loss of her sister.     ALLERGIES:  No Known Allergies    MEDICATIONS:  STANDING MEDICATIONS  ALBUTerol    90 MICROgram(s) HFA Inhaler 1 Puff(s) Inhalation every 4 hours  ALBUTerol/ipratropium for Nebulization 3 milliLiter(s) Nebulizer every 6 hours  amLODIPine   Tablet 5 milliGRAM(s) Oral daily  aspirin  chewable 81 milliGRAM(s) Oral daily  atorvastatin Oral Tab/Cap - Peds 40 milliGRAM(s) Oral daily  gabapentin 300 milliGRAM(s) Oral two times a day  guaiFENesin  milliGRAM(s) Oral every 12 hours  heparin  Injectable 5000 Unit(s) SubCutaneous every 8 hours  insulin glargine Injectable (LANTUS) 30 Unit(s) SubCutaneous at bedtime  melatonin 5 milliGRAM(s) Oral at bedtime  pioglitazone 30 milliGRAM(s) Oral daily  predniSONE   Tablet 50 milliGRAM(s) Oral daily  tiotropium 18 MICROgram(s) Capsule 1 Capsule(s) Inhalation daily    PRN MEDICATIONS    VITALS:   T(F): 97.1  HR: 70  BP: 176/84  RR: 18  SpO2: 98%    LABS:                        10.6   8.51  )-----------( 229      ( 2018 12:25 )             33.5     04-11    138  |  100  |  33<H>  ----------------------------<  153<H>  4.2   |  24  |  1.9<H>    Ca    8.0<L>      2018 12:25    TPro  6.7  /  Alb  3.5  /  TBili  0.2  /  DBili  x   /  AST  27  /  ALT  17  /  AlkPhos  98  04-11    PT/INR - ( 2018 12:25 )   PT: 12.00 sec;   INR: 1.11 ratio         PTT - ( 2018 12:25 )  PTT:27.1 sec      Creatine Kinase, Serum: 657 U/L <H> (18 @ 12:25)  Troponin T, Serum: <0.01 ng/mL (18 @ 12:25)      CARDIAC MARKERS ( 2018 12:25 )  x     / <0.01 ng/mL / 657 U/L / x     / <0.1 ng/mL        Cultures:      RADIOLOGY:    PHYSICAL EXAM:  GEN: No acute distress  LUNGS: bilateral wheezing noted  HEART: S1/S2 present. RRR.   ABD: Soft, non-tender, non-distended. Bowel sounds present. hernia noted in the bikini line.   EXT: extensive swelling notes in bilateral upper and lower extremities.   NEURO: AAOX3 LENGTH OF HOSPITAL STAY: 1d    CHIEF COMPLAINT:   Patient is a 78y old  Female who presents with a chief complaint of SOB    79 yo F with PMH of JUDAH due to cancer, diabetes, hypertension, hypercholesterolemia, postherpetic neuralgia, abdominal hernia and leg swelling presents for evaluation of SOB. Pt states she has cough, sputum production, runny nose for several days. She ran out albuterol inhaler. States she has asthma but never had formal testing. Returned from her sisters  in [place] several days ago. No chest pain, muscle aches, seasonal allergies, trouble handling secretions.     Overnight events:    No acute events overnight. Patient was seen at bedside this morning. Patient was in no acute distress. Patient reported to minor lower back pain. Patient was emotional during interview due to the recent loss of her sister.     ALLERGIES:  No Known Allergies    MEDICATIONS:  STANDING MEDICATIONS  ALBUTerol    90 MICROgram(s) HFA Inhaler 1 Puff(s) Inhalation every 4 hours  ALBUTerol/ipratropium for Nebulization 3 milliLiter(s) Nebulizer every 6 hours  amLODIPine   Tablet 5 milliGRAM(s) Oral daily  aspirin  chewable 81 milliGRAM(s) Oral daily  atorvastatin Oral Tab/Cap - Peds 40 milliGRAM(s) Oral daily  gabapentin 300 milliGRAM(s) Oral two times a day  guaiFENesin  milliGRAM(s) Oral every 12 hours  heparin  Injectable 5000 Unit(s) SubCutaneous every 8 hours  insulin glargine Injectable (LANTUS) 30 Unit(s) SubCutaneous at bedtime  melatonin 5 milliGRAM(s) Oral at bedtime  pioglitazone 30 milliGRAM(s) Oral daily  predniSONE   Tablet 50 milliGRAM(s) Oral daily  tiotropium 18 MICROgram(s) Capsule 1 Capsule(s) Inhalation daily    PRN MEDICATIONS    VITALS:   T(F): 97.1  HR: 70  BP: 176/84  RR: 18  SpO2: 98%    LABS:                        10.6   8.51  )-----------( 229      ( 2018 12:25 )             33.5     04-11    138  |  100  |  33<H>  ----------------------------<  153<H>  4.2   |  24  |  1.9<H>    Ca    8.0<L>      2018 12:25    TPro  6.7  /  Alb  3.5  /  TBili  0.2  /  DBili  x   /  AST  27  /  ALT  17  /  AlkPhos  98  04-11    PT/INR - ( 2018 12:25 )   PT: 12.00 sec;   INR: 1.11 ratio         PTT - ( 2018 12:25 )  PTT:27.1 sec      Creatine Kinase, Serum: 657 U/L <H> (18 @ 12:25)  Troponin T, Serum: <0.01 ng/mL (18 @ 12:25)      CARDIAC MARKERS ( 2018 12:25 )  x     / <0.01 ng/mL / 657 U/L / x     / <0.1 ng/mL        Cultures:      RADIOLOGY:    PHYSICAL EXAM:  GEN: No acute distress  LUNGS: bilateral wheezing noted  HEART: S1/S2 present. RRR.   ABD: Soft, non-tender, non-distended. Bowel sounds present. Hernia noted in the bikini line.   EXT: extensive swelling notes in bilateral upper and lower extremities.   NEURO: AAOX3 LENGTH OF HOSPITAL STAY: 1d    CHIEF COMPLAINT:   Patient is a 78y old  Female who presents with a chief complaint of SOB    79 yo F with PMH of JUDAH due to cancer, diabetes, hypertension, hypercholesterolemia, postherpetic neuralgia, abdominal hernia and leg swelling presents for evaluation of SOB. Pt states she has cough, sputum production, runny nose for several days. patient reported she ran out of her albuterol inhaler. She states she has asthma but never had formal testing for it. She recently returned from her sisters  in [place] several days ago, where she got wet in the rain which she says might have exacerbated her asthma. She denied chest pain, muscle aches, seasonal allergies, trouble handling secretions.     Overnight events:    No acute events overnight. Patient was seen at bedside this morning. Patient was in no acute distress. Patient reported to minor lower back pain. Patient was emotional during interview due to the recent loss of her sister.     ALLERGIES:  No Known Allergies    MEDICATIONS:  STANDING MEDICATIONS  ALBUTerol    90 MICROgram(s) HFA Inhaler 1 Puff(s) Inhalation every 4 hours  ALBUTerol/ipratropium for Nebulization 3 milliLiter(s) Nebulizer every 6 hours  amLODIPine   Tablet 5 milliGRAM(s) Oral daily  aspirin  chewable 81 milliGRAM(s) Oral daily  atorvastatin Oral Tab/Cap - Peds 40 milliGRAM(s) Oral daily  gabapentin 300 milliGRAM(s) Oral two times a day  guaiFENesin  milliGRAM(s) Oral every 12 hours  heparin  Injectable 5000 Unit(s) SubCutaneous every 8 hours  insulin glargine Injectable (LANTUS) 30 Unit(s) SubCutaneous at bedtime  melatonin 5 milliGRAM(s) Oral at bedtime  pioglitazone 30 milliGRAM(s) Oral daily  predniSONE   Tablet 50 milliGRAM(s) Oral daily  tiotropium 18 MICROgram(s) Capsule 1 Capsule(s) Inhalation daily    PRN MEDICATIONS    VITALS:   T(F): 97.1  HR: 70  BP: 176/84  RR: 18  SpO2: 98%    LABS:                        10.6   8.51  )-----------( 229      ( 2018 12:25 )             33.5     04-11    138  |  100  |  33<H>  ----------------------------<  153<H>  4.2   |  24  |  1.9<H>    Ca    8.0<L>      2018 12:25    TPro  6.7  /  Alb  3.5  /  TBili  0.2  /  DBili  x   /  AST  27  /  ALT  17  /  AlkPhos  98  04-11    PT/INR - ( 2018 12:25 )   PT: 12.00 sec;   INR: 1.11 ratio         PTT - ( 2018 12:25 )  PTT:27.1 sec      Creatine Kinase, Serum: 657 U/L <H> (18 @ 12:25)  Troponin T, Serum: <0.01 ng/mL (18 @ 12:25)      CARDIAC MARKERS ( 2018 12:25 )  x     / <0.01 ng/mL / 657 U/L / x     / <0.1 ng/mL        Cultures:      RADIOLOGY:    PHYSICAL EXAM:  GEN: No acute distress  LUNGS: bilateral wheezing noted  HEART: S1/S2 present. RRR.   ABD: Soft, non-tender, non-distended. Bowel sounds present. Hernia noted in the bikini line.   EXT: extensive swelling notes in bilateral upper and lower extremities.   NEURO: AAOX3

## 2018-04-12 NOTE — DISCHARGE NOTE ADULT - MEDICATION SUMMARY - MEDICATIONS TO TAKE
normal... I will START or STAY ON the medications listed below when I get home from the hospital:    predniSONE 10 mg oral tablet  -- 3 tablets (30 mg ) for 2 days f/b 2 tablets (20 mg ) for two days f/b 1 tablet (10 mg for wo )  -- Indication: For Asthma    aspirin 81 mg oral tablet  -- 1 tab(s) by mouth once a day  -- Indication: For Type 2 diabetes mellitus without complication, with long-term current use of insulin    gabapentin 600 mg oral tablet  -- 600 milligram(s) by mouth every 8 hours  -- Indication: For Type 2 diabetes mellitus without complication, with long-term current use of insulin    pioglitazone 30 mg oral tablet  -- 1 tab(s) by mouth once a day  -- Indication: For Type 2 diabetes mellitus without complication, with long-term current use of insulin    Victoza 18 mg/3 mL subcutaneous solution  -- 1 application subcutaneous once a day  -- Indication: For Type 2 diabetes mellitus without complication, with long-term current use of insulin    Toujeo SoloStar 300 units/mL subcutaneous solution  -- 45 unit(s) subcutaneous once a day (at bedtime)  -- Indication: For Type 2 diabetes mellitus without complication, with long-term current use of insulin    atorvastatin 40 mg oral tablet  -- 1 tab(s) by mouth once a day (at bedtime)  -- Indication: For Asthma    ipratropium-albuterol 0.5 mg-2.5 mg/3 mLinhalation solution  -- 3 milliliter(s) inhaled every 6 hours, As Needed for bronchospasm  -- Indication: For Asthma    albuterol 90 mcg/inh inhalation aerosol  -- 1 puff(s) inhaled every 4 hours  -- Indication: For Asthma    budesonide-formoterol 160 mcg-4.5 mcg/inh inhalation aerosol  -- 1 puff(s) inhaled 2 times a day   -- Indication: For Asthma    amLODIPine 10 mg oral tablet  -- 1 tab(s) by mouth once a day   -- It is very important that you take or use this exactly as directed.  Do not skip doses or discontinue unless directed by your doctor.  Some non-prescription drugs may aggravate your condition.  Read all labels carefully.  If a warning appears, check with your doctor before taking.    -- Indication: For Hypertension affecting pregnancy, antepartum    Melatonin 5 mg oral tablet  -- 1 tab(s) by mouth once a day (at bedtime)  -- Indication: For Sleep

## 2018-04-12 NOTE — DISCHARGE NOTE ADULT - PATIENT PORTAL LINK FT
You can access the [facility] Portal, offered by [facility], by registering with the following website: [email or web address]

## 2018-04-12 NOTE — DISCHARGE NOTE ADULT - PLAN OF CARE
Resolution Continue with Albuterol and symbicort and steroid for 5 days as prescribed. Please follow up with primary doctor in 1 weeks Investigation and resolution Please follow up with Nephrology in 1-2 weeks Continue with Albuterol and symbicort and steroid taper as prescribed. Please follow up with primary doctor in 1 week

## 2018-04-12 NOTE — DISCHARGE NOTE ADULT - CARE PROVIDER_API CALL
Eliseo Fischer), Internal Medicine  [address] [place] [address]  Phone: [phone]  Fax: [phone]    Kleiner, Morton J (MD), Internal Medicine; Nephrology  [address] [place] [address]  Phone: [phone]  Fax: [phone] Eliseo Fischer), Internal Medicine  [address] [place] [address]  Phone: [phone]  Fax: [phone]    Kleiner, Morton J (MD), Internal Medicine; Nephrology  [address] [place] [address]  Phone: [phone]  Fax: [phone]    Kvng Metz), Critical Care Medicine; Internal Medicine; Pulmonary Disease; Sleep Medicine  [address] [place] [address]  Phone: [phone]  Fax: [phone]

## 2018-04-12 NOTE — DISCHARGE NOTE ADULT - PROVIDER TOKENS
TOKEN:'[id number]',TOKEN:'[id number]' TOKEN:'[id number]',TOKEN:'[id number]',TOKEN:'[id number]'

## 2018-04-12 NOTE — DISCHARGE NOTE ADULT - CARE PLAN
Principal Discharge DX:	Asthma  Goal:	Resolution  Assessment and plan of treatment:	Continue with Albuterol and symbicort and steroid for 5 days as prescribed. Please follow up with primary doctor in 1 weeks  Secondary Diagnosis:	Proteinuria  Goal:	Investigation and resolution  Assessment and plan of treatment:	Please follow up with Nephrology in 1-2 weeks Principal Discharge DX:	Asthma  Goal:	Resolution  Assessment and plan of treatment:	Continue with Albuterol and symbicort and steroid taper as prescribed. Please follow up with primary doctor in 1 week  Secondary Diagnosis:	Proteinuria  Goal:	Investigation and resolution  Assessment and plan of treatment:	Please follow up with Nephrology in 1-2 weeks

## 2018-04-12 NOTE — CONSULT NOTE ADULT - ASSESSMENT
78y Female pmh of DMII, asthma presented to ED for SOB likely 2/2 asthma exacerbation. Pt has CKDIII with remote history of proteinuria and elevated urine pro/crt in the past with leg edema.    #history of high protein/crt  - Check UA, urine protein/crt ratio  - crt at baselin (1.9-2.2)  - previous pro/crt 2641.84, total protein 228  - check SPEP/UPEP/urine , serum free light chain, , check serum albumin, check cholesterol and TG  -   #elevated CK  - trend CK  - no muscle pain, no history of recent fall. 78y Female pmh of DMII, asthma presented to ED for SOB likely 2/2 asthma exacerbation. Pt has CKDIII with remote history of proteinuria and elevated urine pro/crt in the past with leg edema.    #history of high protein/crt, pt subnephriotic range now, could be 2/2 DMII, r/o MM or vasculitis  - Check UA, urine protein/crt ratio  - crt at baselin (1.9-2.2)  - previous pro/crt 2641.84, total protein 228  - check SPEP/UPEP/urine immunofixation, serum free light chain,  - EMIR, complement level  - recommend to start ACE-I/ARB, monitor crt and potassium    #elevated CK  - trend CK  - no muscle pain, no history of recent fall. 78y Female pmh of DMII, asthma presented to ED for SOB likely 2/2 asthma exacerbation. Pt has CKDIII with remote history of proteinuria and elevated urine pro/crt in the past with leg edema.    #history of high protein/crt, pt subnephriotic range now, could be 2/2 DMII, r/o MM or vasculitis  - Check UA, urine protein/crt ratio  - crt at baselin (1.9-2.2)  - previous pro/crt 2641.84, total protein 228  - check SPEP/UPEP/urine immunofixation, serum free light chain,  - EMIR, complement level  - recommend to start ACE-I/ARB once breathing improves, monitor crt and potassium  - consider IV lasix 40mg IV once-cxr congestion, f/u echo    #elevated CK  - trend CK  - no muscle pain, no history of recent fall.     Will follow 78y Female pmh of DMII, asthma presented to ED for SOB likely 2/2 asthma exacerbation. Pt has CKDIII with history of proteinuria ~2.6 g/g creat and leg edema.    CKD stage 4 with proteinuria and long standing DM> 20 yrs  - etiology likely - diabetic nephropathy  - Check UA, urine protein/crt ratio  - previous pro/crt 2. 6 g/g creat with serum albumin 3.5   - check SPEP/UPEP/urine immunofixation, serum free light chain,  - EMIR, complement levels to r/o non diabetic renal disease  -- crt at baseline (1.9-2.2)  - recommend to start ACE-I/ARB once breathing improves, monitor crt and potassium  - consider IV lasix 40mg IV once-cxr congestion, f/u echo    #elevated CK  - trend CK  - no muscle pain, no history of recent fall.     #HTN - monitor BP - elevated on steroids  - increase Amlodipine to 10 mg qd    #DM - needs tight glucose control on steroids  D/W HS      Will follow

## 2018-04-12 NOTE — PROGRESS NOTE ADULT - ASSESSMENT
79 yo F presents with SOB    SOB on exertion  - likely 2/2 asthma exacerbation due to URI however pt recently returned from [facility]-will order duplex and d-dimer to r/o PE  - Wells score low  -Nebs prn    HTN  -c/w amlodipine    DM II  -c/w insulin, hold oral agents    DVT px    Admit to medicine    Carb Consistent Diet    Elevated CK  -f/u level    Anemia  -outpt workup 79 yo F with PMH of JUDAH due to cancer, diabetes, hypertension, hypercholesterolemia, postherpetic neuralgia, abdominal hernia and leg swelling presents for evaluation of SOB.    1- SOB on exertion  - likely 2/2 asthma exacerbation due to URI however pt recently returned from [facility]  - Duplex results came back negative  - f/u d-dimer   - EKG showed RBBB  - chest x-ray came back negative   - Wells score low  - Nebs prn    2- HTN  -c/w amlodipine    3- Extremity Swelling  - ordered Echo  - f/u D-dimer, Troponin levels  - I/O daily  - weights    3- DM II  -c/w insulin, hold oral agents  - f/u finger sticks    4- Elevated CK  -f/u level ---> pending    5- Anemia  -outpt workup    DVT ppx: Heparin SubC 77 yo F with PMH of JUDAH due to cancer, diabetes, hypertension, hypercholesterolemia, postherpetic neuralgia, abdominal hernia and leg swelling presents for evaluation of SOB.    1) SOB on exertion: likely 2/2 asthma; less likely COPD however patient has significant history of smoking (30 years ago, smoked for 30 years 1 pack a week). likely asthma exacerbation due to viral URI however pt recently returned from [facility]  - Duplex --> neg  - EKG showed RBBB  - CXR --> negative   - Wells score low  - Nebs prn (increased to q4)    2) Possible nephrotic syndrome as Cr at 1.9 which is baseline but in 2016 Protein/urine ratio elevated with protein excretion at 2.8g   - no history of HIV, Hepatitis, autoimmune disease  - leg edema noted on exam  - nephro consult  - ordering urine studies : electrolytes, protein/creatiinine ratio, UA,     3) HTN  -c/w amlodipine    4) Extremity Swelling:   - f/u 2D Echo  - f/u D-dimer, Troponin levels  - I/O daily  - weights    5) DM II  -c/w insulin, hold oral agents  - f/u finger sticks    6) Elevated CK --> 657  -f/u level ---> pending    7) Anemia  -outpt workup    DVT ppx: Heparin SubC

## 2018-04-12 NOTE — DISCHARGE NOTE ADULT - NSFTFSERV1RD_GEN_ALL_CORE
teaching and training/medication teaching and assessment/rehabilitation services/observation and assessment

## 2018-04-13 LAB
ANION GAP SERPL CALC-SCNC: 17 MMOL/L — HIGH (ref 7–14)
BASE EXCESS BLDA CALC-SCNC: -2.4 MMOL/L — LOW (ref -2–2)
BASOPHILS # BLD AUTO: 0.01 K/UL — SIGNIFICANT CHANGE UP (ref 0–0.2)
BASOPHILS NFR BLD AUTO: 0.1 % — SIGNIFICANT CHANGE UP (ref 0–1)
BUN SERPL-MCNC: 56 MG/DL — HIGH (ref 10–20)
CALCIUM SERPL-MCNC: 8.4 MG/DL — LOW (ref 8.5–10.1)
CHLORIDE SERPL-SCNC: 96 MMOL/L — LOW (ref 98–110)
CO2 SERPL-SCNC: 23 MMOL/L — SIGNIFICANT CHANGE UP (ref 17–32)
CREAT SERPL-MCNC: 2.1 MG/DL — HIGH (ref 0.7–1.5)
CRP SERPL-MCNC: 7.9 MG/DL — HIGH (ref 0–0.4)
EOSINOPHIL # BLD AUTO: 0 K/UL — SIGNIFICANT CHANGE UP (ref 0–0.7)
EOSINOPHIL NFR BLD AUTO: 0 % — SIGNIFICANT CHANGE UP (ref 0–8)
ERYTHROCYTE [SEDIMENTATION RATE] IN BLOOD: 126 MM/HR — HIGH (ref 0–20)
ESTIMATED AVERAGE GLUCOSE: 192 MG/DL — HIGH (ref 68–114)
GAS PNL BLDA: SIGNIFICANT CHANGE UP
GLUCOSE SERPL-MCNC: 458 MG/DL — CRITICAL HIGH (ref 70–99)
HBA1C BLD-MCNC: 8.3 % — HIGH (ref 4–5.6)
HCO3 BLDA-SCNC: 22 MMOL/L — LOW (ref 23–27)
HCT VFR BLD CALC: 32.2 % — LOW (ref 37–47)
HGB BLD-MCNC: 10.5 G/DL — LOW (ref 12–16)
HOROWITZ INDEX BLDA+IHG-RTO: 21 — SIGNIFICANT CHANGE UP
IMM GRANULOCYTES NFR BLD AUTO: 0.8 % — HIGH (ref 0.1–0.3)
LYMPHOCYTES # BLD AUTO: 0.93 K/UL — LOW (ref 1.2–3.4)
LYMPHOCYTES # BLD AUTO: 7.7 % — LOW (ref 20.5–51.1)
MCHC RBC-ENTMCNC: 26.5 PG — LOW (ref 27–31)
MCHC RBC-ENTMCNC: 32.6 G/DL — SIGNIFICANT CHANGE UP (ref 32–37)
MCV RBC AUTO: 81.3 FL — SIGNIFICANT CHANGE UP (ref 81–99)
MONOCYTES # BLD AUTO: 0.47 K/UL — SIGNIFICANT CHANGE UP (ref 0.1–0.6)
MONOCYTES NFR BLD AUTO: 3.9 % — SIGNIFICANT CHANGE UP (ref 1.7–9.3)
NEUTROPHILS # BLD AUTO: 10.52 K/UL — HIGH (ref 1.4–6.5)
NEUTROPHILS NFR BLD AUTO: 87.5 % — HIGH (ref 42.2–75.2)
NRBC # BLD: 0 /100 WBCS — SIGNIFICANT CHANGE UP (ref 0–0)
PCO2 BLDA: 34 MMHG — LOW (ref 38–42)
PH BLDA: 7.41 — SIGNIFICANT CHANGE UP (ref 7.38–7.42)
PLATELET # BLD AUTO: 253 K/UL — SIGNIFICANT CHANGE UP (ref 130–400)
PO2 BLDA: 74 MMHG — LOW (ref 78–95)
POTASSIUM SERPL-MCNC: 4.3 MMOL/L — SIGNIFICANT CHANGE UP (ref 3.5–5)
POTASSIUM SERPL-SCNC: 4.3 MMOL/L — SIGNIFICANT CHANGE UP (ref 3.5–5)
PROT ?TM UR-MCNC: 139 MG/DL — HIGH (ref 0–12)
PROT ?TM UR-MCNC: 139 MG/DL — HIGH (ref 0–12)
RBC # BLD: 3.96 M/UL — LOW (ref 4.2–5.4)
RBC # FLD: 16 % — HIGH (ref 11.5–14.5)
SAO2 % BLDA: 95 % — SIGNIFICANT CHANGE UP (ref 94–98)
SODIUM SERPL-SCNC: 136 MMOL/L — SIGNIFICANT CHANGE UP (ref 135–146)
WBC # BLD: 12.03 K/UL — HIGH (ref 4.8–10.8)
WBC # FLD AUTO: 12.03 K/UL — HIGH (ref 4.8–10.8)

## 2018-04-13 RX ORDER — INSULIN LISPRO 100/ML
16 VIAL (ML) SUBCUTANEOUS
Qty: 0 | Refills: 0 | Status: DISCONTINUED | OUTPATIENT
Start: 2018-04-13 | End: 2018-04-13

## 2018-04-13 RX ORDER — INSULIN LISPRO 100/ML
15 VIAL (ML) SUBCUTANEOUS
Qty: 0 | Refills: 0 | Status: DISCONTINUED | OUTPATIENT
Start: 2018-04-13 | End: 2018-04-16

## 2018-04-13 RX ORDER — INSULIN HUMAN 100 [IU]/ML
10 INJECTION, SOLUTION SUBCUTANEOUS ONCE
Qty: 0 | Refills: 0 | Status: COMPLETED | OUTPATIENT
Start: 2018-04-13 | End: 2018-04-13

## 2018-04-13 RX ORDER — LIDOCAINE 4 G/100G
1 CREAM TOPICAL DAILY
Qty: 0 | Refills: 0 | Status: DISCONTINUED | OUTPATIENT
Start: 2018-04-13 | End: 2018-04-16

## 2018-04-13 RX ORDER — INSULIN GLARGINE 100 [IU]/ML
45 INJECTION, SOLUTION SUBCUTANEOUS AT BEDTIME
Qty: 0 | Refills: 0 | Status: DISCONTINUED | OUTPATIENT
Start: 2018-04-13 | End: 2018-04-16

## 2018-04-13 RX ORDER — INSULIN LISPRO 100/ML
10 VIAL (ML) SUBCUTANEOUS ONCE
Qty: 0 | Refills: 0 | Status: COMPLETED | OUTPATIENT
Start: 2018-04-13 | End: 2018-04-13

## 2018-04-13 RX ORDER — INSULIN GLARGINE 100 [IU]/ML
50 INJECTION, SOLUTION SUBCUTANEOUS AT BEDTIME
Qty: 0 | Refills: 0 | Status: DISCONTINUED | OUTPATIENT
Start: 2018-04-13 | End: 2018-04-13

## 2018-04-13 RX ORDER — FUROSEMIDE 40 MG
40 TABLET ORAL DAILY
Qty: 0 | Refills: 0 | Status: DISCONTINUED | OUTPATIENT
Start: 2018-04-13 | End: 2018-04-15

## 2018-04-13 RX ORDER — INSULIN GLARGINE 100 [IU]/ML
48 INJECTION, SOLUTION SUBCUTANEOUS AT BEDTIME
Qty: 0 | Refills: 0 | Status: DISCONTINUED | OUTPATIENT
Start: 2018-04-13 | End: 2018-04-13

## 2018-04-13 RX ORDER — INSULIN LISPRO 100/ML
10 VIAL (ML) SUBCUTANEOUS ONCE
Qty: 0 | Refills: 0 | Status: DISCONTINUED | OUTPATIENT
Start: 2018-04-13 | End: 2018-04-16

## 2018-04-13 RX ORDER — LIDOCAINE 4 G/100G
1 CREAM TOPICAL DAILY
Qty: 0 | Refills: 0 | Status: DISCONTINUED | OUTPATIENT
Start: 2018-04-13 | End: 2018-04-13

## 2018-04-13 RX ORDER — INSULIN LISPRO 100/ML
24 VIAL (ML) SUBCUTANEOUS ONCE
Qty: 0 | Refills: 0 | Status: COMPLETED | OUTPATIENT
Start: 2018-04-13 | End: 2018-04-13

## 2018-04-13 RX ADMIN — ATORVASTATIN CALCIUM 40 MILLIGRAM(S): 80 TABLET, FILM COATED ORAL at 11:06

## 2018-04-13 RX ADMIN — Medication 40 MILLIGRAM(S): at 11:08

## 2018-04-13 RX ADMIN — HEPARIN SODIUM 5000 UNIT(S): 5000 INJECTION INTRAVENOUS; SUBCUTANEOUS at 22:12

## 2018-04-13 RX ADMIN — HEPARIN SODIUM 5000 UNIT(S): 5000 INJECTION INTRAVENOUS; SUBCUTANEOUS at 14:54

## 2018-04-13 RX ADMIN — Medication 16 UNIT(S): at 13:42

## 2018-04-13 RX ADMIN — LIDOCAINE 1 PATCH: 4 CREAM TOPICAL at 11:07

## 2018-04-13 RX ADMIN — Medication 40 MILLIGRAM(S): at 06:03

## 2018-04-13 RX ADMIN — Medication 3 MILLILITER(S): at 20:03

## 2018-04-13 RX ADMIN — AZITHROMYCIN 500 MILLIGRAM(S): 500 TABLET, FILM COATED ORAL at 11:07

## 2018-04-13 RX ADMIN — GABAPENTIN 300 MILLIGRAM(S): 400 CAPSULE ORAL at 17:38

## 2018-04-13 RX ADMIN — INSULIN HUMAN 10 UNIT(S): 100 INJECTION, SOLUTION SUBCUTANEOUS at 14:55

## 2018-04-13 RX ADMIN — HEPARIN SODIUM 5000 UNIT(S): 5000 INJECTION INTRAVENOUS; SUBCUTANEOUS at 06:03

## 2018-04-13 RX ADMIN — Medication 24 UNIT(S): at 15:03

## 2018-04-13 RX ADMIN — Medication 15 UNIT(S): at 17:38

## 2018-04-13 RX ADMIN — Medication 3 MILLILITER(S): at 07:26

## 2018-04-13 RX ADMIN — Medication 40 MILLIGRAM(S): at 13:41

## 2018-04-13 RX ADMIN — Medication 10 UNIT(S): at 08:00

## 2018-04-13 RX ADMIN — Medication 10 UNIT(S): at 10:05

## 2018-04-13 RX ADMIN — GABAPENTIN 300 MILLIGRAM(S): 400 CAPSULE ORAL at 06:03

## 2018-04-13 RX ADMIN — AMLODIPINE BESYLATE 10 MILLIGRAM(S): 2.5 TABLET ORAL at 06:03

## 2018-04-13 RX ADMIN — Medication 3 MILLILITER(S): at 13:12

## 2018-04-13 RX ADMIN — Medication 600 MILLIGRAM(S): at 17:38

## 2018-04-13 RX ADMIN — INSULIN HUMAN 10 UNIT(S): 100 INJECTION, SOLUTION SUBCUTANEOUS at 11:53

## 2018-04-13 RX ADMIN — LIDOCAINE 1 PATCH: 4 CREAM TOPICAL at 22:30

## 2018-04-13 RX ADMIN — Medication 81 MILLIGRAM(S): at 11:06

## 2018-04-13 RX ADMIN — Medication 600 MILLIGRAM(S): at 06:04

## 2018-04-13 RX ADMIN — INSULIN GLARGINE 45 UNIT(S): 100 INJECTION, SOLUTION SUBCUTANEOUS at 22:16

## 2018-04-13 NOTE — PROGRESS NOTE ADULT - SUBJECTIVE AND OBJECTIVE BOX
LENGTH OF HOSPITAL STAY: 2d    CHIEF COMPLAINT:   Patient is a 78y old  Female who presents with a chief complaint of SOB (12 Apr 2018 12:02)      Overnight events:    No acute events overnight. patient was seen at bedside this morning. patient had no complains.    ALLERGIES:  No Known Allergies    MEDICATIONS:  STANDING MEDICATIONS  ALBUTerol    90 MICROgram(s) HFA Inhaler 1 Puff(s) Inhalation every 4 hours  ALBUTerol/ipratropium for Nebulization 3 milliLiter(s) Nebulizer every 6 hours  amLODIPine   Tablet 10 milliGRAM(s) Oral daily  aspirin  chewable 81 milliGRAM(s) Oral daily  atorvastatin Oral Tab/Cap - Peds 40 milliGRAM(s) Oral daily  azithromycin   Tablet 500 milliGRAM(s) Oral daily  buDESOnide 160 MICROgram(s)/formoterol 4.5 MICROgram(s) Inhaler 2 Puff(s) Inhalation two times a day  dextrose 5%. 1000 milliLiter(s) IV Continuous <Continuous>  dextrose 50% Injectable 12.5 Gram(s) IV Push once  dextrose 50% Injectable 25 Gram(s) IV Push once  dextrose 50% Injectable 25 Gram(s) IV Push once  furosemide    Tablet 40 milliGRAM(s) Oral daily  gabapentin 300 milliGRAM(s) Oral two times a day  guaiFENesin  milliGRAM(s) Oral every 12 hours  heparin  Injectable 5000 Unit(s) SubCutaneous every 8 hours  insulin glargine Injectable (LANTUS) 48 Unit(s) SubCutaneous at bedtime  insulin lispro Injectable (HumaLOG) 10 Unit(s) SubCutaneous once  insulin lispro Injectable (HumaLOG) 16 Unit(s) SubCutaneous before breakfast  insulin lispro Injectable (HumaLOG) 16 Unit(s) SubCutaneous before lunch  insulin lispro Injectable (HumaLOG) 16 Unit(s) SubCutaneous before dinner  insulin regular  human recombinant. 10 Unit(s) SubCutaneous once  melatonin 5 milliGRAM(s) Oral at bedtime  predniSONE   Tablet 40 milliGRAM(s) Oral daily  tiotropium 18 MICROgram(s) Capsule 1 Capsule(s) Inhalation daily    PRN MEDICATIONS  dextrose Gel 1 Dose(s) Oral once PRN  glucagon  Injectable 1 milliGRAM(s) IntraMuscular once PRN    VITALS:   T(F): 95.9  HR: 89  BP: 149/70  RR: 28  SpO2: --    LABS:                        10.5   12.03 )-----------( 253      ( 13 Apr 2018 06:02 )             32.2     04-13    136  |  96<L>  |  56<H>  ----------------------------<  458<HH>  4.3   |  23  |  2.1<H>    Ca    8.4<L>      13 Apr 2018 06:02    TPro  6.7  /  Alb  3.5  /  TBili  0.2  /  DBili  x   /  AST  27  /  ALT  17  /  AlkPhos  98  04-11    PT/INR - ( 11 Apr 2018 12:25 )   PT: 12.00 sec;   INR: 1.11 ratio         PTT - ( 11 Apr 2018 12:25 )  PTT:27.1 sec    ABG - ( 13 Apr 2018 11:05 )  pH: 7.41  /  pCO2: 34    /  pO2: 74    / HCO3: 22    / Base Excess: -2.4  /  SaO2: 95          Sedimentation Rate, Erythrocyte: 126 mm/hr <H> (04-12-18 @ 16:22)  Troponin T, Serum: <0.01 ng/mL (04-12-18 @ 12:09)    CARDIAC MARKERS ( 12 Apr 2018 12:09 )  x     / <0.01 ng/mL / x     / x     / x      CARDIAC MARKERS ( 11 Apr 2018 12:25 )  x     / <0.01 ng/mL / 657 U/L / x     / <0.1 ng/mL    Cultures:    RADIOLOGY:    PHYSICAL EXAM:  GEN: No acute distress  HEENT: no abnormalities noted  LUNGS: Clear to auscultation bilaterally; improved from yesterday   HEART: S1/S2 present. RRR.   ABD: Soft, non-tender, non-distended. Bowel sounds present  EXT: swelling present and consistent with PE from yesterday in upper and lower bilateral extremities.   NEURO: AAOX3

## 2018-04-13 NOTE — PROGRESS NOTE ADULT - ATTENDING COMMENTS
Patient seen and examined independently of resident. Case discussed with housestaff, nursing and patient

## 2018-04-13 NOTE — PROGRESS NOTE ADULT - SUBJECTIVE AND OBJECTIVE BOX
JULEE TOLLIVER  78y  Female      Patient is a 78y old  Female who presents with a chief complaint of SOB (12 Apr 2018 12:02)      INTERVAL HPI/OVERNIGHT EVENTS: patient breathing better today. less coughing, ambulating. of note sugars have been quite high, but patient not having any abdominal pain or n/v      REVIEW OF SYSTEMS:  as above  All other review of systems negative    T(C): 35.5 (04-13-18 @ 05:39), Max: 35.9 (04-12-18 @ 21:47)  HR: 89 (04-13-18 @ 05:39) (89 - 105)  BP: 149/70 (04-13-18 @ 05:39) (149/70 - 177/93)  RR: 28 (04-13-18 @ 05:39) (24 - 28)  SpO2: --  Wt(kg): --Vital Signs Last 24 Hrs  T(C): 35.5 (13 Apr 2018 05:39), Max: 35.9 (12 Apr 2018 21:47)  T(F): 95.9 (13 Apr 2018 05:39), Max: 96.7 (12 Apr 2018 21:47)  HR: 89 (13 Apr 2018 05:39) (89 - 105)  BP: 149/70 (13 Apr 2018 05:39) (149/70 - 177/93)  BP(mean): --  RR: 28 (13 Apr 2018 05:39) (24 - 28)  SpO2: --        PHYSICAL EXAM:  GENERAL: NAD  PSYCH: no agitation, baseline mentation  NERVOUS SYSTEM:  Alert & Oriented X3, no new focal deficits  PULMONARY: poor air exchange b/l with subtle end expiratory wheezes, peakflow at bedside 200 today, no tripoding, nontoxic appearing, speaking full sentences  CARDIOVASCULAR: Regular rate and rhythm; No murmurs, rubs, or gallops  GI: Soft, Nontender, Nondistended; Bowel sounds present, obese  EXTREMITIES:  third space edema, mild diffuse    Consultant(s) Notes Reviewed:  [x ] YES  [ ] NO    Discussed with Consultants/Other Providers [ x] YES     LABS                          10.5   12.03 )-----------( 253      ( 13 Apr 2018 06:02 )             32.2     04-13    136  |  96<L>  |  56<H>  ----------------------------<  458<HH>  4.3   |  23  |  2.1<H>    Ca    8.4<L>      13 Apr 2018 06:02      ABG - ( 13 Apr 2018 11:05 )  pH: 7.41  /  pCO2: 34    /  pO2: 74    / HCO3: 22    / Base Excess: -2.4  /  SaO2: 95                    Lactate Trend    CARDIAC MARKERS ( 12 Apr 2018 12:09 )  x     / <0.01 ng/mL / x     / x     / x          CAPILLARY BLOOD GLUCOSE  504 (13 Apr 2018 14:51)            RADIOLOGY & ADDITIONAL TESTS:    Imaging Personally Reviewed:  [ ] YES  [ ] NO    HEALTH ISSUES - PROBLEM Dx:

## 2018-04-13 NOTE — PROGRESS NOTE ADULT - SUBJECTIVE AND OBJECTIVE BOX
Nephrology progress note    Patient is seen and examined, events over the last 24 h noted .    Allergies:  No Known Allergies    Hospital Medications:   MEDICATIONS  (STANDING):  ALBUTerol    90 MICROgram(s) HFA Inhaler 1 Puff(s) Inhalation every 4 hours  ALBUTerol/ipratropium for Nebulization 3 milliLiter(s) Nebulizer every 6 hours  amLODIPine   Tablet 10 milliGRAM(s) Oral daily  aspirin  chewable 81 milliGRAM(s) Oral daily  atorvastatin Oral Tab/Cap - Peds 40 milliGRAM(s) Oral daily  azithromycin   Tablet 500 milliGRAM(s) Oral daily  buDESOnide 160 MICROgram(s)/formoterol 4.5 MICROgram(s) Inhaler 2 Puff(s) Inhalation two times a day  dextrose 5%. 1000 milliLiter(s) (50 mL/Hr) IV Continuous <Continuous>  dextrose 50% Injectable 12.5 Gram(s) IV Push once  dextrose 50% Injectable 25 Gram(s) IV Push once  dextrose 50% Injectable 25 Gram(s) IV Push once  furosemide    Tablet 40 milliGRAM(s) Oral daily  gabapentin 300 milliGRAM(s) Oral two times a day  guaiFENesin  milliGRAM(s) Oral every 12 hours  heparin  Injectable 5000 Unit(s) SubCutaneous every 8 hours  insulin glargine Injectable (LANTUS) 48 Unit(s) SubCutaneous at bedtime  insulin lispro Injectable (HumaLOG) 10 Unit(s) SubCutaneous once  insulin lispro Injectable (HumaLOG) 16 Unit(s) SubCutaneous before breakfast  insulin lispro Injectable (HumaLOG) 16 Unit(s) SubCutaneous before lunch  insulin lispro Injectable (HumaLOG) 16 Unit(s) SubCutaneous before dinner  lidocaine   Patch 1 Patch Transdermal daily  lidocaine   Patch 1 Patch Transdermal daily  melatonin 5 milliGRAM(s) Oral at bedtime  predniSONE   Tablet 40 milliGRAM(s) Oral daily  tiotropium 18 MICROgram(s) Capsule 1 Capsule(s) Inhalation daily        VITALS:  T(F): 95.9 (04-13-18 @ 05:39), Max: 96.7 (04-12-18 @ 21:47)  HR: 89 (04-13-18 @ 05:39)  BP: 149/70 (04-13-18 @ 05:39)  RR: 28 (04-13-18 @ 05:39)  SpO2: --  Wt(kg): --        PHYSICAL EXAM:  Constitutional: NAD, still in mild resp distress  HEENT: anicteric sclera, MMM    Respiratory: less wheezing, no rales  Cardiovascular: S1, S2, RRR  Gastrointestinal: BS+, soft, NT/ND  Extremities: 1+ peripheral edema  Neurological: A/O x 3, no focal deficits  : No CVA tenderness. No alcala.   Skin: No rashes  Vascular Access:    LABS:  04-13    136  |  96<L>  |  56<H>  ----------------------------<  458<HH>  4.3   |  23  |  2.1<H>    Ca    8.4<L>      13 Apr 2018 06:02                            10.5   12.03 )-----------( 253      ( 13 Apr 2018 06:02 )             32.2       Urine Studies:      RADIOLOGY & ADDITIONAL STUDIES:

## 2018-04-13 NOTE — PROGRESS NOTE ADULT - ASSESSMENT
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, hyperreactive airway disease, former light smoker, here with acute exacerbation of underlying hyperreactive airway disease (asthma vs copd), mild volume overload, poorly controlled HTN, dm2 with hyperglycemia potentiated by steroid    empiric abx, titrate IV steroid- will try to change to po steroid tomorrow, c/w symbicort, duonebs ATC +PRN  i/o, daily weights  ua/ lytes/ upc f/u  uptitrated antihypertensives, agree with increasing CCB  dvt ppx  daily peak flow  ambulatory O2  for hyperglycemia ABG has ruled out developing DKA  will uptitrate insulin basal bolus, as well as several extra doses of insulin regular given today. will place patient npo for now until FS's drop to the 200-300 range  lidoderm patch to R anterior thigh  tylenol analgesia  unclear why patient has significantly elevated inflammatory markers, suspect this is secondary to the exacerbation of hyperreactive airway disease but it would be worthwhile to repeat down the line after bronchospasm has subsided in about 2 weeks. may benefit from rheumatologic workup as an outpatient once acute exacerbation has resolved

## 2018-04-13 NOTE — PROGRESS NOTE ADULT - ASSESSMENT
79 yo F with PMH of JUDAH due to cancer, diabetes, hypertension, hypercholesterolemia, postherpetic neuralgia, abdominal hernia and leg swelling presents for evaluation of SOB.    1) SOB on exertion: likely 2/2 asthma; less likely COPD however patient has significant history of smoking (30 years ago, smoked for 30 years 1 pack a week). likely asthma exacerbation due to viral URI however pt recently returned from [place]  - Duplex --> neg  - EKG showed RBBB  - CXR --> negative   - Wells score low  - Nebs prn (increased to q4)    2) Possible nephrotic syndrome as Cr at 1.9 which is baseline but in 2016 Protein/urine ratio elevated with protein excretion at 2.8g   - no history of HIV, Hepatitis, autoimmune disease  - leg edema noted on exam  - f/u nephro consult   - labs ordered ---> pending results     3) HTN  -c/w amlodipine    4) Extremity Swelling:   - f/u 2D Echo---> pending   - D-dimer 559   - Troponin levels --> pending  - I/O daily  - weights    5) DM II  - sugar levels elevated --> c/w with insulin regimens  - f/u finger sticks  - ABG ordered --> follow results    6) Elevated CK --> 657  -f/u level ---> pending    7) Anemia  -outpt workup    DVT ppx: Heparin SubC    DISPO: Home 77 yo F with PMH of JUDAH due to cancer, diabetes, hypertension, hypercholesterolemia, postherpetic neuralgia, abdominal hernia and leg swelling presents for evaluation of SOB.    1) SOB on exertion: likely 2/2 asthma; less likely COPD however patient has significant history of smoking (30 years ago, smoked for 30 years 1 pack a week). likely asthma exacerbation due to viral URI however pt recently returned from [place]  - Duplex --> neg  - EKG showed RBBB  - CXR --> negative   - Wells score low  - Nebs prn (increased to q4)    2) Possible nephrotic syndrome as Cr at 1.9 which is baseline but in 2016 Protein/urine ratio elevated with protein excretion at 2.8g   - no history of HIV, Hepatitis, autoimmune disease  - leg edema noted on exam  - f/u nephro consult   - labs ordered ---> pending results     3) HTN  -c/w amlodipine    4) Extremity Swelling:   - f/u 2D Echo---> pending   - D-dimer 559   - Troponin levels --> pending  - I/O daily  - weights    5) DM II  - sugar levels elevated --> increase insulin management; will update note with details later today (med student note)  - f/u finger sticks  - ABG ordered --> follow results    6) Elevated CK --> 657  -f/u level ---> pending    7) Anemia  -outpt workup    DVT ppx: Heparin SubC    DISPO: Home

## 2018-04-13 NOTE — PROGRESS NOTE ADULT - ASSESSMENT
78y Female pmh of DMII, asthma presented to ED for SOB likely 2/2 asthma exacerbation. Pt has CKD with history of proteinuria ~2.6 g/g creat and leg edema.    CKD stage 4 with proteinuria and long standing DM> 20 yrs  - etiology likely - diabetic nephropathy  - Check UA, urine protein/crt ratio  - previous pro/crt 2. 6 g/g creat with serum albumin 3.5   - check SPEP/UPEP/urine immunofixation, serum free light chain,  - EMIR, complement levels to r/o non diabetic renal disease  -- crt at baseline (1.9-2.2)  - recommend to start ACE-I/ARB once breathing improves, monitor crt and potassium  Respiratory - asthma, mild fluid overload  - dyspnea improving on IV staroids and LAsix IV  -f/u 2D ECho     - no muscle pain, no history of recent fall.     #HTN - BP better controlled -   - on Amlodipine to 10 mg qd    #DM - needs tight glucose control on steroids  D/W HS      Will follow

## 2018-04-14 LAB
ANA PAT FLD IF-IMP: (no result)
ANA TITR SER: (no result)
DSDNA AB SER-ACNC: <12 IU/ML — SIGNIFICANT CHANGE UP

## 2018-04-14 RX ORDER — ACETAMINOPHEN 500 MG
650 TABLET ORAL ONCE
Qty: 0 | Refills: 0 | Status: COMPLETED | OUTPATIENT
Start: 2018-04-14 | End: 2018-04-14

## 2018-04-14 RX ADMIN — HEPARIN SODIUM 5000 UNIT(S): 5000 INJECTION INTRAVENOUS; SUBCUTANEOUS at 22:34

## 2018-04-14 RX ADMIN — LIDOCAINE 1 PATCH: 4 CREAM TOPICAL at 23:30

## 2018-04-14 RX ADMIN — AZITHROMYCIN 500 MILLIGRAM(S): 500 TABLET, FILM COATED ORAL at 11:30

## 2018-04-14 RX ADMIN — Medication 600 MILLIGRAM(S): at 05:45

## 2018-04-14 RX ADMIN — HEPARIN SODIUM 5000 UNIT(S): 5000 INJECTION INTRAVENOUS; SUBCUTANEOUS at 17:54

## 2018-04-14 RX ADMIN — GABAPENTIN 300 MILLIGRAM(S): 400 CAPSULE ORAL at 17:20

## 2018-04-14 RX ADMIN — Medication 40 MILLIGRAM(S): at 05:46

## 2018-04-14 RX ADMIN — Medication 15 UNIT(S): at 08:04

## 2018-04-14 RX ADMIN — Medication 15 UNIT(S): at 17:20

## 2018-04-14 RX ADMIN — Medication 81 MILLIGRAM(S): at 11:29

## 2018-04-14 RX ADMIN — GABAPENTIN 300 MILLIGRAM(S): 400 CAPSULE ORAL at 05:45

## 2018-04-14 RX ADMIN — Medication 650 MILLIGRAM(S): at 04:48

## 2018-04-14 RX ADMIN — LIDOCAINE 1 PATCH: 4 CREAM TOPICAL at 11:29

## 2018-04-14 RX ADMIN — INSULIN GLARGINE 45 UNIT(S): 100 INJECTION, SOLUTION SUBCUTANEOUS at 22:31

## 2018-04-14 RX ADMIN — BUDESONIDE AND FORMOTEROL FUMARATE DIHYDRATE 2 PUFF(S): 160; 4.5 AEROSOL RESPIRATORY (INHALATION) at 11:27

## 2018-04-14 RX ADMIN — Medication 3 MILLILITER(S): at 01:15

## 2018-04-14 RX ADMIN — Medication 600 MILLIGRAM(S): at 17:21

## 2018-04-14 RX ADMIN — Medication 30 MILLIGRAM(S): at 22:34

## 2018-04-14 RX ADMIN — Medication 650 MILLIGRAM(S): at 05:30

## 2018-04-14 RX ADMIN — Medication 15 UNIT(S): at 11:27

## 2018-04-14 RX ADMIN — HEPARIN SODIUM 5000 UNIT(S): 5000 INJECTION INTRAVENOUS; SUBCUTANEOUS at 05:46

## 2018-04-14 RX ADMIN — AMLODIPINE BESYLATE 10 MILLIGRAM(S): 2.5 TABLET ORAL at 05:44

## 2018-04-14 RX ADMIN — Medication 3 MILLILITER(S): at 08:10

## 2018-04-14 RX ADMIN — Medication 5 MILLIGRAM(S): at 22:35

## 2018-04-14 RX ADMIN — ATORVASTATIN CALCIUM 40 MILLIGRAM(S): 80 TABLET, FILM COATED ORAL at 22:34

## 2018-04-14 RX ADMIN — Medication 3 MILLILITER(S): at 19:45

## 2018-04-14 NOTE — PROGRESS NOTE ADULT - SUBJECTIVE AND OBJECTIVE BOX
LENGTH OF HOSPITAL STAY: 3d    CHIEF COMPLAINT:   Patient is a 78y old  Female who presents with a chief complaint of SOB (12 Apr 2018 12:02)      Overnight events:    No acute events overnight    ALLERGIES:  No Known Allergies    MEDICATIONS:  STANDING MEDICATIONS  ALBUTerol    90 MICROgram(s) HFA Inhaler 1 Puff(s) Inhalation every 4 hours  ALBUTerol/ipratropium for Nebulization 3 milliLiter(s) Nebulizer every 6 hours  amLODIPine   Tablet 10 milliGRAM(s) Oral daily  aspirin  chewable 81 milliGRAM(s) Oral daily  atorvastatin Oral Tab/Cap - Peds 40 milliGRAM(s) Oral daily  azithromycin   Tablet 500 milliGRAM(s) Oral daily  buDESOnide 160 MICROgram(s)/formoterol 4.5 MICROgram(s) Inhaler 2 Puff(s) Inhalation two times a day  dextrose 5%. 1000 milliLiter(s) IV Continuous <Continuous>  dextrose 50% Injectable 12.5 Gram(s) IV Push once  dextrose 50% Injectable 25 Gram(s) IV Push once  dextrose 50% Injectable 25 Gram(s) IV Push once  furosemide    Tablet 40 milliGRAM(s) Oral daily  gabapentin 300 milliGRAM(s) Oral two times a day  guaiFENesin  milliGRAM(s) Oral every 12 hours  heparin  Injectable 5000 Unit(s) SubCutaneous every 8 hours  insulin glargine Injectable (LANTUS) 45 Unit(s) SubCutaneous at bedtime  insulin lispro Injectable (HumaLOG) 15 Unit(s) SubCutaneous before breakfast  insulin lispro Injectable (HumaLOG) 15 Unit(s) SubCutaneous before lunch  insulin lispro Injectable (HumaLOG) 15 Unit(s) SubCutaneous before dinner  insulin lispro Injectable (HumaLOG) 10 Unit(s) SubCutaneous once  lidocaine   Patch 1 Patch Transdermal daily  melatonin 5 milliGRAM(s) Oral at bedtime  predniSONE   Tablet 30 milliGRAM(s) Oral daily  tiotropium 18 MICROgram(s) Capsule 1 Capsule(s) Inhalation daily    PRN MEDICATIONS  dextrose Gel 1 Dose(s) Oral once PRN  glucagon  Injectable 1 milliGRAM(s) IntraMuscular once PRN    VITALS:   T(F): 96.5  HR: 73  BP: 150/67  RR: 18  SpO2: --    LABS:                        10.5   12.03 )-----------( 253      ( 13 Apr 2018 06:02 )             32.2     04-13    136  |  96<L>  |  56<H>  ----------------------------<  458<HH>  4.3   |  23  |  2.1<H>    Ca    8.4<L>      13 Apr 2018 06:02          ABG - ( 13 Apr 2018 11:05 )  pH: 7.41  /  pCO2: 34    /  pO2: 74    / HCO3: 22    / Base Excess: -2.4  /  SaO2: 95            Cultures:      RADIOLOGY:    PHYSICAL EXAM:  GEN: No acute distress  LUNGS: Clear to auscultation bilaterally   HEART: S1/S2 present. RRR.   ABD: Soft, non-tender, non-distended. Bowel sounds present  EXT: bilateral extremity edema  NEURO: AAOX3

## 2018-04-14 NOTE — PROGRESS NOTE ADULT - ASSESSMENT
77 yo F with PMH of JUDAH due to cancer, diabetes, hypertension, hypercholesterolemia, postherpetic neuralgia, abdominal hernia and leg swelling presents for evaluation of SOB.    1) SOB on exertion: likely 2/2 asthma; less likely COPD however patient has significant history of smoking (30 years ago, smoked for 30 years 1 pack a week). likely asthma exacerbation due to viral URI however pt recently returned from [place]  - Duplex --> neg  - EKG showed RBBB  - CXR --> negative   - Wells score low  - Nebs prn (increased to q4)    2) Possible nephrotic syndrome as Cr at 1.9 which is baseline but in 2016 Protein/urine ratio elevated with protein excretion at 2.8g   - no history of HIV, Hepatitis, autoimmune disease  - leg edema noted on exam  - f/u nephro consult --> continue current management; pending urine labs: electrolytes, protein/creatinine, UPEP, SPEP, will touch base with nephro with results  - ESR elevated to 126    3) HTN  -c/w amlodipine    4) Extremity Swelling:   - f/u 2D Echo---> wnl   - D-dimer 559   - I/O daily  - weights    5) DM II: sugars difficult to control on 4/13 with >70U of lispro given during the day with adjustments to the lantus for the following day  - f/u finger sticks  - ABG ordered --> pH 7.41, bicarb wnl, no anion gap    6) Anemia  -outpt workup    DVT ppx: Heparin SubC    DISPO: Home

## 2018-04-14 NOTE — PROGRESS NOTE ADULT - SUBJECTIVE AND OBJECTIVE BOX
JULEE TOLLIVER  78y  Female      Patient is a 78y old  Female who presents with a chief complaint of SOB (12 Apr 2018 12:02)      INTERVAL HPI/OVERNIGHT EVENTS: patient breathing better today. less mariee. less coughing. better sugars. eating. no abdominal pain or n/v      REVIEW OF SYSTEMS:  as above  All other review of systems negative    T(C): 35.9 (04-14-18 @ 12:26), Max: 35.9 (04-14-18 @ 12:26)  HR: 92 (04-14-18 @ 12:26) (80 - 92)  BP: 147/68 (04-14-18 @ 12:26) (136/69 - 147/68)  RR: 18 (04-14-18 @ 12:26) (18 - 19)  SpO2: 98% (04-13-18 @ 19:10) (98% - 98%)  Wt(kg): --Vital Signs Last 24 Hrs  T(C): 35.9 (14 Apr 2018 12:26), Max: 35.9 (14 Apr 2018 12:26)  T(F): 96.7 (14 Apr 2018 12:26), Max: 96.7 (14 Apr 2018 12:26)  HR: 92 (14 Apr 2018 12:26) (80 - 92)  BP: 147/68 (14 Apr 2018 12:26) (136/69 - 147/68)  BP(mean): --  RR: 18 (14 Apr 2018 12:26) (18 - 19)  SpO2: 98% (13 Apr 2018 19:10) (98% - 98%)        PHYSICAL EXAM:  GENERAL: NAD  PSYCH: no agitation, baseline mentation  NERVOUS SYSTEM:  Alert & Oriented X3, no new focal deficits  PULMONARY: end expiratory b/l wheezing improving. peak flow up to 250  CARDIOVASCULAR: Regular rate and rhythm; No murmurs, rubs, or gallops  GI: Soft, Nontender, Nondistended; Bowel sounds present obese  EXTREMITIES:  less third space edema    Consultant(s) Notes Reviewed:  [x ] YES  [ ] NO    Discussed with Consultants/Other Providers [ x] YES     LABS                          10.5   12.03 )-----------( 253      ( 13 Apr 2018 06:02 )             32.2     04-13    136  |  96<L>  |  56<H>  ----------------------------<  458<HH>  4.3   |  23  |  2.1<H>    Ca    8.4<L>      13 Apr 2018 06:02      ABG - ( 13 Apr 2018 11:05 )  pH: 7.41  /  pCO2: 34    /  pO2: 74    / HCO3: 22    / Base Excess: -2.4  /  SaO2: 95                    Lactate Trend        CAPILLARY BLOOD GLUCOSE  196 (14 Apr 2018 16:36)            RADIOLOGY & ADDITIONAL TESTS:    Imaging Personally Reviewed:  [ ] YES  [ x] NO    HEALTH ISSUES - PROBLEM Dx:

## 2018-04-14 NOTE — PROGRESS NOTE ADULT - ASSESSMENT
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, hyperreactive airway disease, former light smoker, here with acute exacerbation of underlying hyperreactive airway disease (asthma vs copd), mild volume overload, poorly controlled HTN, dm2 with hyperglycemia potentiated by steroid    empiric abx, titrate to PO steroid, c/w symbicort, duonebs ATC +PRN  i/o, daily weights  gentle oral diuresis now  BP's better with increased CCB  dvt ppx  daily peak flow improving  ambulatory O2 mid 90s  glycemic control improving  f/u labs r/o any development of agma/ dka  no evidence of this right now  if continues to improve may possible discharge patient tomorrow

## 2018-04-15 LAB
ANION GAP SERPL CALC-SCNC: 18 MMOL/L — HIGH (ref 7–14)
BASOPHILS # BLD AUTO: 0.02 K/UL — SIGNIFICANT CHANGE UP (ref 0–0.2)
BASOPHILS NFR BLD AUTO: 0.2 % — SIGNIFICANT CHANGE UP (ref 0–1)
BUN SERPL-MCNC: 78 MG/DL — CRITICAL HIGH (ref 10–20)
CALCIUM SERPL-MCNC: 8 MG/DL — LOW (ref 8.5–10.1)
CHLORIDE SERPL-SCNC: 97 MMOL/L — LOW (ref 98–110)
CO2 SERPL-SCNC: 21 MMOL/L — SIGNIFICANT CHANGE UP (ref 17–32)
CREAT SERPL-MCNC: 2.5 MG/DL — HIGH (ref 0.7–1.5)
EOSINOPHIL # BLD AUTO: 0 K/UL — SIGNIFICANT CHANGE UP (ref 0–0.7)
EOSINOPHIL NFR BLD AUTO: 0 % — SIGNIFICANT CHANGE UP (ref 0–8)
GLUCOSE SERPL-MCNC: 385 MG/DL — HIGH (ref 70–99)
HCT VFR BLD CALC: 34.2 % — LOW (ref 37–47)
HGB BLD-MCNC: 11.1 G/DL — LOW (ref 12–16)
IMM GRANULOCYTES NFR BLD AUTO: 2.1 % — HIGH (ref 0.1–0.3)
LACTATE SERPL-SCNC: 2.6 MMOL/L — HIGH (ref 0.5–2.2)
LACTATE SERPL-SCNC: 2.7 MMOL/L — HIGH (ref 0.5–2.2)
LYMPHOCYTES # BLD AUTO: 0.78 K/UL — LOW (ref 1.2–3.4)
LYMPHOCYTES # BLD AUTO: 9.6 % — LOW (ref 20.5–51.1)
MCHC RBC-ENTMCNC: 26.7 PG — LOW (ref 27–31)
MCHC RBC-ENTMCNC: 32.5 G/DL — SIGNIFICANT CHANGE UP (ref 32–37)
MCV RBC AUTO: 82.4 FL — SIGNIFICANT CHANGE UP (ref 81–99)
MONOCYTES # BLD AUTO: 0.44 K/UL — SIGNIFICANT CHANGE UP (ref 0.1–0.6)
MONOCYTES NFR BLD AUTO: 5.4 % — SIGNIFICANT CHANGE UP (ref 1.7–9.3)
NEUTROPHILS # BLD AUTO: 6.73 K/UL — HIGH (ref 1.4–6.5)
NEUTROPHILS NFR BLD AUTO: 82.7 % — HIGH (ref 42.2–75.2)
NRBC # BLD: 0 /100 WBCS — SIGNIFICANT CHANGE UP (ref 0–0)
PLATELET # BLD AUTO: 291 K/UL — SIGNIFICANT CHANGE UP (ref 130–400)
POTASSIUM SERPL-MCNC: 4.7 MMOL/L — SIGNIFICANT CHANGE UP (ref 3.5–5)
POTASSIUM SERPL-SCNC: 4.7 MMOL/L — SIGNIFICANT CHANGE UP (ref 3.5–5)
RBC # BLD: 4.15 M/UL — LOW (ref 4.2–5.4)
RBC # FLD: 16.1 % — HIGH (ref 11.5–14.5)
SODIUM SERPL-SCNC: 136 MMOL/L — SIGNIFICANT CHANGE UP (ref 135–146)
WBC # BLD: 8.14 K/UL — SIGNIFICANT CHANGE UP (ref 4.8–10.8)
WBC # FLD AUTO: 8.14 K/UL — SIGNIFICANT CHANGE UP (ref 4.8–10.8)

## 2018-04-15 RX ORDER — INSULIN HUMAN 100 [IU]/ML
10 INJECTION, SOLUTION SUBCUTANEOUS ONCE
Qty: 0 | Refills: 0 | Status: COMPLETED | OUTPATIENT
Start: 2018-04-15 | End: 2018-04-15

## 2018-04-15 RX ADMIN — AZITHROMYCIN 500 MILLIGRAM(S): 500 TABLET, FILM COATED ORAL at 11:15

## 2018-04-15 RX ADMIN — Medication 3 MILLILITER(S): at 08:06

## 2018-04-15 RX ADMIN — INSULIN GLARGINE 45 UNIT(S): 100 INJECTION, SOLUTION SUBCUTANEOUS at 21:42

## 2018-04-15 RX ADMIN — LIDOCAINE 1 PATCH: 4 CREAM TOPICAL at 11:14

## 2018-04-15 RX ADMIN — Medication 600 MILLIGRAM(S): at 06:04

## 2018-04-15 RX ADMIN — BUDESONIDE AND FORMOTEROL FUMARATE DIHYDRATE 2 PUFF(S): 160; 4.5 AEROSOL RESPIRATORY (INHALATION) at 08:29

## 2018-04-15 RX ADMIN — Medication 30 MILLIGRAM(S): at 06:08

## 2018-04-15 RX ADMIN — Medication 600 MILLIGRAM(S): at 17:13

## 2018-04-15 RX ADMIN — Medication 3 MILLILITER(S): at 20:51

## 2018-04-15 RX ADMIN — ATORVASTATIN CALCIUM 40 MILLIGRAM(S): 80 TABLET, FILM COATED ORAL at 21:41

## 2018-04-15 RX ADMIN — Medication 81 MILLIGRAM(S): at 11:17

## 2018-04-15 RX ADMIN — AMLODIPINE BESYLATE 10 MILLIGRAM(S): 2.5 TABLET ORAL at 06:07

## 2018-04-15 RX ADMIN — Medication 40 MILLIGRAM(S): at 06:08

## 2018-04-15 RX ADMIN — Medication 15 UNIT(S): at 08:29

## 2018-04-15 RX ADMIN — HEPARIN SODIUM 5000 UNIT(S): 5000 INJECTION INTRAVENOUS; SUBCUTANEOUS at 21:42

## 2018-04-15 RX ADMIN — Medication 5 MILLIGRAM(S): at 21:42

## 2018-04-15 RX ADMIN — HEPARIN SODIUM 5000 UNIT(S): 5000 INJECTION INTRAVENOUS; SUBCUTANEOUS at 06:07

## 2018-04-15 RX ADMIN — Medication 3 MILLILITER(S): at 02:49

## 2018-04-15 RX ADMIN — Medication 15 UNIT(S): at 12:18

## 2018-04-15 RX ADMIN — Medication 3 MILLILITER(S): at 14:03

## 2018-04-15 RX ADMIN — GABAPENTIN 300 MILLIGRAM(S): 400 CAPSULE ORAL at 06:07

## 2018-04-15 RX ADMIN — GABAPENTIN 300 MILLIGRAM(S): 400 CAPSULE ORAL at 17:13

## 2018-04-15 RX ADMIN — Medication 15 UNIT(S): at 16:45

## 2018-04-15 NOTE — PROGRESS NOTE ADULT - ASSESSMENT
78y Female pmh of DMII, asthma presented to ED for SOB likely 2/2 asthma exacerbation. Pt has CKD with history of proteinuria ~2.6 g/g creat and leg edema.    CKD stage 4 with proteinuria and long standing DM> 20 yrs  - etiology likely - diabetic nephropathy  - Check UA, urine protein/crt ratio  - previous pro/crt 2. 6 g/g creat with serum albumin 3.5   - check SPEP/UPEP/urine immunofixation, serum free light chain,  - EMIR, + rest of the workup pending  -- crt at baseline (1.9-2.2)  - recommend renal sonogram  - check po4/pth      #Respiratory - asthma, mild fluid overload  uncontrolled blood sugar reading need good control  -f/u 2D ECho     - no muscle pain, no history of recent fall.     #HTN - BP better controlled -   - on Amlodipine to 10 mg qd        Will follow

## 2018-04-15 NOTE — PROGRESS NOTE ADULT - SUBJECTIVE AND OBJECTIVE BOX
JULEE TOLLIVER  78y  Female      Patient is a 78y old  Female who presents with a chief complaint of SOB (12 Apr 2018 12:02)      INTERVAL HPI/OVERNIGHT EVENTS: breathing well. no dyspnea on exertion. urinating      REVIEW OF SYSTEMS:  as above  All other review of systems negative    T(C): 35.7 (04-15-18 @ 13:52), Max: 35.8 (04-14-18 @ 21:45)  HR: 84 (04-15-18 @ 13:52) (70 - 84)  BP: 133/68 (04-15-18 @ 13:52) (133/68 - 150/67)  RR: 18 (04-15-18 @ 13:52) (18 - 18)  SpO2: 97% (04-15-18 @ 08:31) (97% - 97%)  Wt(kg): --Vital Signs Last 24 Hrs  T(C): 35.7 (15 Apr 2018 13:52), Max: 35.8 (14 Apr 2018 21:45)  T(F): 96.2 (15 Apr 2018 13:52), Max: 96.5 (14 Apr 2018 21:45)  HR: 84 (15 Apr 2018 13:52) (70 - 84)  BP: 133/68 (15 Apr 2018 13:52) (133/68 - 150/67)  BP(mean): --  RR: 18 (15 Apr 2018 13:52) (18 - 18)  SpO2: 97% (15 Apr 2018 08:31) (97% - 97%)        PHYSICAL EXAM:  GENERAL: NAD  PSYCH: no agitation, baseline mentation  NERVOUS SYSTEM:  Alert & Oriented X3, no new focal deficits  PULMONARY: bilateral end expiratory wheezing, improving air exchange, peak flow up to 270  CARDIOVASCULAR: Regular rate and rhythm; No murmurs, rubs, or gallops  GI: Soft, Nontender, Nondistended; Bowel sounds present obese  EXTREMITIES:  edema resolved    Consultant(s) Notes Reviewed:  [x ] YES  [ ] NO    Discussed with Consultants/Other Providers [ x] YES     LABS                          11.1   8.14  )-----------( 291      ( 15 Apr 2018 11:22 )             34.2     04-15    136  |  97<L>  |  78<HH>  ----------------------------<  385<H>  4.7   |  21  |  2.5<H>    Ca    8.0<L>      15 Apr 2018 11:22            Lactate Trend  04-15 @ 11:45 Lactate:2.7         CAPILLARY BLOOD GLUCOSE  365 (15 Apr 2018 16:00)            RADIOLOGY & ADDITIONAL TESTS:    Imaging Personally Reviewed:  [ ] YES  [ x] NO    HEALTH ISSUES - PROBLEM Dx:

## 2018-04-15 NOTE — PROGRESS NOTE ADULT - ASSESSMENT
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, hyperreactive airway disease, former light smoker, here with acute exacerbation of underlying hyperreactive airway disease (asthma vs copd), mild volume overload, poorly controlled HTN, dm2 with hyperglycemia potentiated by steroid, katy on ckd3    empiric abx, titrate to PO steroid in am again, c/w symbicort, duonebs ATC +PRN  i/o, daily weights  STOP gentle oral diuresis  check official renal us  bedside bladderscan for now  BP's better with increased CCB  dvt ppx  daily peak flow improving  glycemic control a bit labile but better than 2 days ago  likely for d/c tomorrow if renal us unremarkable, cr baseline, and breathing better in the am

## 2018-04-15 NOTE — PROGRESS NOTE ADULT - SUBJECTIVE AND OBJECTIVE BOX
Nephrology progress note    Patient is seen and examined, events over the last 24 h noted .  denies any complaints  breathing and lower leg swelling is better    Allergies:  No Known Allergies    Hospital Medications:   MEDICATIONS  (STANDING):  ALBUTerol    90 MICROgram(s) HFA Inhaler 1 Puff(s) Inhalation every 4 hours  ALBUTerol/ipratropium for Nebulization 3 milliLiter(s) Nebulizer every 6 hours  amLODIPine   Tablet 10 milliGRAM(s) Oral daily  aspirin  chewable 81 milliGRAM(s) Oral daily  atorvastatin Oral Tab/Cap - Peds 40 milliGRAM(s) Oral daily  azithromycin   Tablet 500 milliGRAM(s) Oral daily  buDESOnide 160 MICROgram(s)/formoterol 4.5 MICROgram(s) Inhaler 2 Puff(s) Inhalation two times a day  dextrose 5%. 1000 milliLiter(s) (50 mL/Hr) IV Continuous <Continuous>  gabapentin 300 milliGRAM(s) Oral two times a day  guaiFENesin  milliGRAM(s) Oral every 12 hours  heparin  Injectable 5000 Unit(s) SubCutaneous every 8 hours  insulin glargine Injectable (LANTUS) 45 Unit(s) SubCutaneous at bedtime  insulin lispro Injectable (HumaLOG) 15 Unit(s) SubCutaneous before breakfast  insulin lispro Injectable (HumaLOG) 15 Unit(s) SubCutaneous before lunch  insulin lispro Injectable (HumaLOG) 15 Unit(s) SubCutaneous before dinner  insulin lispro Injectable (HumaLOG) 10 Unit(s) SubCutaneous once  lidocaine   Patch 1 Patch Transdermal daily  melatonin 5 milliGRAM(s) Oral at bedtime  predniSONE   Tablet 30 milliGRAM(s) Oral daily  tiotropium 18 MICROgram(s) Capsule 1 Capsule(s) Inhalation daily        VITALS:  T(F): 96.2 (04-15-18 @ 13:52), Max: 96.5 (04-14-18 @ 21:45)  HR: 84 (04-15-18 @ 13:52)  BP: 133/68 (04-15-18 @ 13:52)  RR: 18 (04-15-18 @ 13:52)  SpO2: 97% (04-15-18 @ 08:31)  Wt(kg): --        PHYSICAL EXAM:  Constitutional: NAD  HEENT: anicteric sclera, oropharynx clear, MMM  Neck: No JVD  Respiratory: bilateral basal crackles  Cardiovascular: S1, S2, RRR  Gastrointestinal: BS+, soft, NT/ND  Extremities: + pedal edema  :  No alcala.   Skin: No rashes    LABS:  04-15    136  |  97<L>  |  78<HH>  ----------------------------<  385<H>  4.7   |  21  |  2.5<H>    Ca    8.0<L>      15 Apr 2018 11:22                            11.1   8.14  )-----------( 291      ( 15 Apr 2018 11:22 )             34.2     2.5 <--, 2.1 <--, 2.2 <--, 1.9 <-- trend in creatinine    VA Duplex Lower Ext Vein Scan, Bilat (04.11.18 @ 16:27)   Impression:    No evidence of deep venous thrombosis or superficial thrombophlebitis in   bilateral lower extremities.

## 2018-04-16 VITALS
DIASTOLIC BLOOD PRESSURE: 74 MMHG | SYSTOLIC BLOOD PRESSURE: 123 MMHG | RESPIRATION RATE: 16 BRPM | HEART RATE: 75 BPM | TEMPERATURE: 97 F

## 2018-04-16 LAB
% GAMMA, URINE: 9.3 % — SIGNIFICANT CHANGE UP
ALBUMIN 24H MFR UR ELPH: 70.6 % — SIGNIFICANT CHANGE UP
ALPHA1 GLOB 24H MFR UR ELPH: 5.9 % — SIGNIFICANT CHANGE UP
ALPHA2 GLOB 24H MFR UR ELPH: 4.7 % — SIGNIFICANT CHANGE UP
ANION GAP SERPL CALC-SCNC: 18 MMOL/L — HIGH (ref 7–14)
B-GLOBULIN 24H MFR UR ELPH: 9.5 % — SIGNIFICANT CHANGE UP
BUN SERPL-MCNC: 76 MG/DL — CRITICAL HIGH (ref 10–20)
CALCIUM SERPL-MCNC: 8.1 MG/DL — LOW (ref 8.5–10.1)
CHLORIDE SERPL-SCNC: 103 MMOL/L — SIGNIFICANT CHANGE UP (ref 98–110)
CO2 SERPL-SCNC: 18 MMOL/L — SIGNIFICANT CHANGE UP (ref 17–32)
CREAT SERPL-MCNC: 2.3 MG/DL — HIGH (ref 0.7–1.5)
GLUCOSE SERPL-MCNC: 226 MG/DL — HIGH (ref 70–99)
HCT VFR BLD CALC: 32.6 % — LOW (ref 37–47)
HGB BLD-MCNC: 10.5 G/DL — LOW (ref 12–16)
INTERPRETATION 24H UR IFE-IMP: SIGNIFICANT CHANGE UP
INTERPRETATION 24H UR IFE-IMP: SIGNIFICANT CHANGE UP
M PROTEIN 24H UR ELPH-MRATE: SIGNIFICANT CHANGE UP
MCHC RBC-ENTMCNC: 26.5 PG — LOW (ref 27–31)
MCHC RBC-ENTMCNC: 32.2 G/DL — SIGNIFICANT CHANGE UP (ref 32–37)
MCV RBC AUTO: 82.3 FL — SIGNIFICANT CHANGE UP (ref 81–99)
NRBC # BLD: 0 /100 WBCS — SIGNIFICANT CHANGE UP (ref 0–0)
PLATELET # BLD AUTO: 299 K/UL — SIGNIFICANT CHANGE UP (ref 130–400)
POTASSIUM SERPL-MCNC: 4.7 MMOL/L — SIGNIFICANT CHANGE UP (ref 3.5–5)
POTASSIUM SERPL-SCNC: 4.7 MMOL/L — SIGNIFICANT CHANGE UP (ref 3.5–5)
PROT ?TM UR-MCNC: 139 MG/DL — HIGH (ref 0–12)
PROT PATTERN 24H UR ELPH-IMP: SIGNIFICANT CHANGE UP
RBC # BLD: 3.96 M/UL — LOW (ref 4.2–5.4)
RBC # FLD: 16.3 % — HIGH (ref 11.5–14.5)
SODIUM SERPL-SCNC: 139 MMOL/L — SIGNIFICANT CHANGE UP (ref 135–146)
WBC # BLD: 10.59 K/UL — SIGNIFICANT CHANGE UP (ref 4.8–10.8)
WBC # FLD AUTO: 10.59 K/UL — SIGNIFICANT CHANGE UP (ref 4.8–10.8)

## 2018-04-16 RX ORDER — IPRATROPIUM/ALBUTEROL SULFATE 18-103MCG
3 AEROSOL WITH ADAPTER (GRAM) INHALATION
Qty: 0 | Refills: 0 | DISCHARGE
Start: 2018-04-16

## 2018-04-16 RX ORDER — ALBUTEROL 90 UG/1
1 AEROSOL, METERED ORAL
Qty: 0 | Refills: 0 | DISCHARGE
Start: 2018-04-16

## 2018-04-16 RX ORDER — AMLODIPINE BESYLATE 2.5 MG/1
1 TABLET ORAL
Qty: 30 | Refills: 0
Start: 2018-04-16 | End: 2018-05-15

## 2018-04-16 RX ORDER — ATORVASTATIN CALCIUM 80 MG/1
1 TABLET, FILM COATED ORAL
Qty: 0 | Refills: 0 | COMMUNITY

## 2018-04-16 RX ORDER — BUDESONIDE AND FORMOTEROL FUMARATE DIHYDRATE 160; 4.5 UG/1; UG/1
1 AEROSOL RESPIRATORY (INHALATION)
Qty: 1 | Refills: 0
Start: 2018-04-16 | End: 2018-05-15

## 2018-04-16 RX ORDER — INSULIN GLARGINE 100 [IU]/ML
30 INJECTION, SOLUTION SUBCUTANEOUS
Qty: 0 | Refills: 0 | COMMUNITY

## 2018-04-16 RX ORDER — AMLODIPINE BESYLATE 2.5 MG/1
2 TABLET ORAL
Qty: 0 | Refills: 0 | COMMUNITY

## 2018-04-16 RX ORDER — BUDESONIDE AND FORMOTEROL FUMARATE DIHYDRATE 160; 4.5 UG/1; UG/1
2 AEROSOL RESPIRATORY (INHALATION)
Qty: 0 | Refills: 0 | COMMUNITY
Start: 2018-04-16

## 2018-04-16 RX ORDER — AMLODIPINE BESYLATE 2.5 MG/1
1 TABLET ORAL
Qty: 0 | Refills: 0 | COMMUNITY

## 2018-04-16 RX ADMIN — Medication 30 MILLIGRAM(S): at 06:06

## 2018-04-16 RX ADMIN — Medication 15 UNIT(S): at 07:48

## 2018-04-16 RX ADMIN — Medication 81 MILLIGRAM(S): at 11:17

## 2018-04-16 RX ADMIN — LIDOCAINE 1 PATCH: 4 CREAM TOPICAL at 03:13

## 2018-04-16 RX ADMIN — AZITHROMYCIN 500 MILLIGRAM(S): 500 TABLET, FILM COATED ORAL at 11:17

## 2018-04-16 RX ADMIN — AMLODIPINE BESYLATE 10 MILLIGRAM(S): 2.5 TABLET ORAL at 06:04

## 2018-04-16 RX ADMIN — HEPARIN SODIUM 5000 UNIT(S): 5000 INJECTION INTRAVENOUS; SUBCUTANEOUS at 06:06

## 2018-04-16 RX ADMIN — BUDESONIDE AND FORMOTEROL FUMARATE DIHYDRATE 2 PUFF(S): 160; 4.5 AEROSOL RESPIRATORY (INHALATION) at 07:48

## 2018-04-16 RX ADMIN — LIDOCAINE 1 PATCH: 4 CREAM TOPICAL at 11:16

## 2018-04-16 RX ADMIN — Medication 3 MILLILITER(S): at 07:53

## 2018-04-16 RX ADMIN — Medication 15 UNIT(S): at 12:05

## 2018-04-16 RX ADMIN — Medication 3 MILLILITER(S): at 02:40

## 2018-04-16 RX ADMIN — Medication 600 MILLIGRAM(S): at 06:06

## 2018-04-16 RX ADMIN — GABAPENTIN 300 MILLIGRAM(S): 400 CAPSULE ORAL at 06:06

## 2018-04-16 NOTE — PROGRESS NOTE ADULT - ASSESSMENT
77 yo F with PMH of JUDAH due to cancer, diabetes, hypertension, hypercholesterolemia, postherpetic neuralgia, abdominal hernia and leg swelling presents for evaluation of SOB.    # SOB on exertion  - likely 2/2 asthma   - Duplex neg   - Nebs prn (increased to q4)    # Possible nephrotic syndrome as Cr at 1.9 which is baseline but in 2016 Protein/urine ratio elevated with protein excretion at 2.8g   - no history of HIV, Hepatitis, autoimmune disease  - Nephro on board  - f/u protein/creatinine, UPEP, SPEP, urine p/cr ratio  - ESR elevated to 126  - f/u Renal sono    # HTN  -c/w amlodipine    # Extremity Swelling:   - 2D Echo wnl   - I/O daily  - weights    # DM II  - f/u finger sticks  - difficult to control    # Anemia  -o/p workup    DVT ppx: Heparin SubC    DISPO: Home

## 2018-04-16 NOTE — PROGRESS NOTE ADULT - PROVIDER SPECIALTY LIST ADULT
Hospitalist
Internal Medicine
Nephrology
Nephrology
Internal Medicine

## 2018-04-16 NOTE — PROGRESS NOTE ADULT - SUBJECTIVE AND OBJECTIVE BOX
Patient is a 78y old  Female who presents with a chief complaint of SOB (2018 12:02)    Pt seen and examined at bedside. No acute complaints. Denies SOB    HPI:  77 yo F presents for evaluation of SOB. Pt states she has cough, sputum production, runny nose for several days. She ran out albuterol inhaler. States she has asthma but never had formal testing. Returned from her sisters  in [place] several days ago. No chest pain, muscle aches, seasonl allergies, trouble handling secretions (2018 15:22)    PAST MEDICAL & SURGICAL HISTORY:  Type 2 diabetes mellitus without complication, with long-term current use of insulin  Hypertension affecting pregnancy, antepartum  Asthma, unspecified asthma severity, unspecified whether complicated, unspecified whether persistent      Vital Signs Last 24 Hrs  T(F): 96.5 (18 @ 05:21), Max: 96.5 (18 @ 05:21)  HR: 82 (18 @ 06:03) (80 - 84)  BP: 139/63 (18 @ 06:03) (133/68 - 200/86)  RR: 18 (18 @ 05:21) (18 - 18)  SpO2: 97% (18 @ 07:31) (97% - 97%)    Labs:                         11.1   8.14  )-----------( 291      ( 15 Apr 2018 11:22 )             34.2     04-15    136  |  97<L>  |  78<HH>  ----------------------------<  385<H>  4.7   |  21  |  2.5<H>    Ca    8.0<L>      15 Apr 2018 11:22      CAPILLARY BLOOD GLUCOSE  245 (2018 11:09)  139 (2018 07:55)  260 (2018 01:35)  374 (15 Apr 2018 21:57)  365 (15 Apr 2018 16:00)  352 (15 Apr 2018 12:04)                      MEDICATIONS  (STANDING):  ALBUTerol    90 MICROgram(s) HFA Inhaler 1 Puff(s) Inhalation every 4 hours  ALBUTerol/ipratropium for Nebulization 3 milliLiter(s) Nebulizer every 6 hours  amLODIPine   Tablet 10 milliGRAM(s) Oral daily  aspirin  chewable 81 milliGRAM(s) Oral daily  atorvastatin Oral Tab/Cap - Peds 40 milliGRAM(s) Oral daily  azithromycin   Tablet 500 milliGRAM(s) Oral daily  buDESOnide 160 MICROgram(s)/formoterol 4.5 MICROgram(s) Inhaler 2 Puff(s) Inhalation two times a day  dextrose 5%. 1000 milliLiter(s) (50 mL/Hr) IV Continuous <Continuous>  dextrose 50% Injectable 12.5 Gram(s) IV Push once  dextrose 50% Injectable 25 Gram(s) IV Push once  dextrose 50% Injectable 25 Gram(s) IV Push once  gabapentin 300 milliGRAM(s) Oral two times a day  guaiFENesin  milliGRAM(s) Oral every 12 hours  heparin  Injectable 5000 Unit(s) SubCutaneous every 8 hours  insulin glargine Injectable (LANTUS) 45 Unit(s) SubCutaneous at bedtime  insulin lispro Injectable (HumaLOG) 15 Unit(s) SubCutaneous before breakfast  insulin lispro Injectable (HumaLOG) 15 Unit(s) SubCutaneous before lunch  insulin lispro Injectable (HumaLOG) 15 Unit(s) SubCutaneous before dinner  insulin lispro Injectable (HumaLOG) 10 Unit(s) SubCutaneous once  lidocaine   Patch 1 Patch Transdermal daily  melatonin 5 milliGRAM(s) Oral at bedtime  predniSONE   Tablet 30 milliGRAM(s) Oral daily  tiotropium 18 MICROgram(s) Capsule 1 Capsule(s) Inhalation daily    MEDICATIONS  (PRN):  dextrose Gel 1 Dose(s) Oral once PRN Blood Glucose LESS THAN 70 milliGRAM(s)/deciliter  glucagon  Injectable 1 milliGRAM(s) IntraMuscular once PRN Glucose LESS THAN 70 milligrams/deciliter      Physical Exam:  General: NAD  HEENT:   Lungs: Exp wheezing B/L  Heart: RRR, Normal S1S2  Abdomen: soft, NT/ND  Extremities: 1+edema b/l  Neuro: AAO x3    Radiology:

## 2018-04-17 LAB
% ALBUMIN: 45.4 % — SIGNIFICANT CHANGE UP
% ALPHA 1: 5.9 % — SIGNIFICANT CHANGE UP
% ALPHA 2: 17.4 % — SIGNIFICANT CHANGE UP
% BETA: 14.8 % — SIGNIFICANT CHANGE UP
% GAMMA: 16.5 % — SIGNIFICANT CHANGE UP
ALBUMIN SERPL ELPH-MCNC: 3 G/DL — LOW (ref 3.6–5.5)
ALBUMIN/GLOB SERPL ELPH: 0.8 RATIO — SIGNIFICANT CHANGE UP
ALPHA1 GLOB SERPL ELPH-MCNC: 0.4 G/DL — SIGNIFICANT CHANGE UP (ref 0.1–0.4)
ALPHA2 GLOB SERPL ELPH-MCNC: 1.2 G/DL — HIGH (ref 0.5–1)
B-GLOBULIN SERPL ELPH-MCNC: 1 G/DL — SIGNIFICANT CHANGE UP (ref 0.5–1)
GAMMA GLOBULIN: 1.1 G/DL — SIGNIFICANT CHANGE UP (ref 0.6–1.6)
PROT PATTERN SERPL ELPH-IMP: SIGNIFICANT CHANGE UP
PROT SERPL-MCNC: 6.7 G/DL — SIGNIFICANT CHANGE UP (ref 6–8.3)

## 2018-04-24 ENCOUNTER — OUTPATIENT (OUTPATIENT)
Dept: OUTPATIENT SERVICES | Facility: HOSPITAL | Age: 78
LOS: 1 days | Discharge: HOME | End: 2018-04-24

## 2018-04-24 ENCOUNTER — APPOINTMENT (OUTPATIENT)
Dept: PODIATRY | Facility: CLINIC | Age: 78
End: 2018-04-24
Payer: MEDICARE

## 2018-04-24 VITALS
WEIGHT: 212 LBS | HEART RATE: 83 BPM | HEIGHT: 60 IN | SYSTOLIC BLOOD PRESSURE: 155 MMHG | BODY MASS INDEX: 41.62 KG/M2 | DIASTOLIC BLOOD PRESSURE: 75 MMHG

## 2018-04-24 PROCEDURE — 99212 OFFICE O/P EST SF 10 MIN: CPT | Mod: 25

## 2018-04-24 PROCEDURE — 11721 DEBRIDE NAIL 6 OR MORE: CPT

## 2018-04-27 DIAGNOSIS — B02.29 OTHER POSTHERPETIC NERVOUS SYSTEM INVOLVEMENT: ICD-10-CM

## 2018-04-27 DIAGNOSIS — Z90.710 ACQUIRED ABSENCE OF BOTH CERVIX AND UTERUS: ICD-10-CM

## 2018-04-27 DIAGNOSIS — E11.65 TYPE 2 DIABETES MELLITUS WITH HYPERGLYCEMIA: ICD-10-CM

## 2018-04-27 DIAGNOSIS — Z87.891 PERSONAL HISTORY OF NICOTINE DEPENDENCE: ICD-10-CM

## 2018-04-27 DIAGNOSIS — E11.21 TYPE 2 DIABETES MELLITUS WITH DIABETIC NEPHROPATHY: ICD-10-CM

## 2018-04-27 DIAGNOSIS — E11.22 TYPE 2 DIABETES MELLITUS WITH DIABETIC CHRONIC KIDNEY DISEASE: ICD-10-CM

## 2018-04-27 DIAGNOSIS — N18.4 CHRONIC KIDNEY DISEASE, STAGE 4 (SEVERE): ICD-10-CM

## 2018-04-27 DIAGNOSIS — R80.8 OTHER PROTEINURIA: ICD-10-CM

## 2018-04-27 DIAGNOSIS — J06.9 ACUTE UPPER RESPIRATORY INFECTION, UNSPECIFIED: ICD-10-CM

## 2018-04-27 DIAGNOSIS — Z79.84 LONG TERM (CURRENT) USE OF ORAL HYPOGLYCEMIC DRUGS: ICD-10-CM

## 2018-04-27 DIAGNOSIS — Z91.81 HISTORY OF FALLING: ICD-10-CM

## 2018-04-27 DIAGNOSIS — Z85.40 PERSONAL HISTORY OF MALIGNANT NEOPLASM OF UNSPECIFIED FEMALE GENITAL ORGAN: ICD-10-CM

## 2018-04-27 DIAGNOSIS — R60.0 LOCALIZED EDEMA: ICD-10-CM

## 2018-04-27 DIAGNOSIS — N17.9 ACUTE KIDNEY FAILURE, UNSPECIFIED: ICD-10-CM

## 2018-04-27 DIAGNOSIS — T38.0X5A ADVERSE EFFECT OF GLUCOCORTICOIDS AND SYNTHETIC ANALOGUES, INITIAL ENCOUNTER: ICD-10-CM

## 2018-04-27 DIAGNOSIS — I12.9 HYPERTENSIVE CHRONIC KIDNEY DISEASE WITH STAGE 1 THROUGH STAGE 4 CHRONIC KIDNEY DISEASE, OR UNSPECIFIED CHRONIC KIDNEY DISEASE: ICD-10-CM

## 2018-04-27 DIAGNOSIS — D64.9 ANEMIA, UNSPECIFIED: ICD-10-CM

## 2018-04-27 DIAGNOSIS — J45.901 UNSPECIFIED ASTHMA WITH (ACUTE) EXACERBATION: ICD-10-CM

## 2018-04-27 DIAGNOSIS — R06.02 SHORTNESS OF BREATH: ICD-10-CM

## 2018-04-27 DIAGNOSIS — E66.9 OBESITY, UNSPECIFIED: ICD-10-CM

## 2018-05-02 ENCOUNTER — OUTPATIENT (OUTPATIENT)
Dept: OUTPATIENT SERVICES | Facility: HOSPITAL | Age: 78
LOS: 1 days | Discharge: HOME | End: 2018-05-02

## 2018-05-02 ENCOUNTER — APPOINTMENT (OUTPATIENT)
Dept: INTERNAL MEDICINE | Facility: CLINIC | Age: 78
End: 2018-05-02

## 2018-05-02 VITALS
DIASTOLIC BLOOD PRESSURE: 79 MMHG | SYSTOLIC BLOOD PRESSURE: 135 MMHG | BODY MASS INDEX: 42.41 KG/M2 | HEIGHT: 60 IN | HEART RATE: 83 BPM | WEIGHT: 216 LBS

## 2018-05-02 DIAGNOSIS — M79.672 PAIN IN LEFT FOOT: ICD-10-CM

## 2018-05-02 DIAGNOSIS — D64.9 ANEMIA, UNSPECIFIED: ICD-10-CM

## 2018-05-02 DIAGNOSIS — Z72.820 SLEEP DEPRIVATION: ICD-10-CM

## 2018-05-02 DIAGNOSIS — G89.29 PAIN IN LEFT FOOT: ICD-10-CM

## 2018-05-02 DIAGNOSIS — G89.29 PAIN IN RIGHT FOOT: ICD-10-CM

## 2018-05-02 DIAGNOSIS — M54.40 LUMBAGO WITH SCIATICA, UNSPECIFIED SIDE: ICD-10-CM

## 2018-05-02 DIAGNOSIS — B35.6 TINEA CRURIS: ICD-10-CM

## 2018-05-02 DIAGNOSIS — M79.671 PAIN IN RIGHT FOOT: ICD-10-CM

## 2018-05-02 DIAGNOSIS — L60.3 NAIL DYSTROPHY: ICD-10-CM

## 2018-05-02 RX ORDER — CAPSAICIN 0.1 G/100G
0.1 CREAM TOPICAL
Qty: 2 | Refills: 4 | Status: DISCONTINUED | COMMUNITY
Start: 2017-07-28 | End: 2018-05-02

## 2018-05-02 RX ORDER — ECONAZOLE NITRATE 10 MG/G
1 CREAM TOPICAL TWICE DAILY
Qty: 1 | Refills: 1 | Status: DISCONTINUED | COMMUNITY
Start: 2017-11-16 | End: 2018-05-02

## 2018-05-03 ENCOUNTER — APPOINTMENT (OUTPATIENT)
Dept: ENDOCRINOLOGY | Facility: CLINIC | Age: 78
End: 2018-05-03

## 2018-07-17 ENCOUNTER — APPOINTMENT (OUTPATIENT)
Dept: PODIATRY | Facility: CLINIC | Age: 78
End: 2018-07-17

## 2018-07-24 PROBLEM — E66.9 NON MORBID OBESITY, UNSPECIFIED OBESITY TYPE: Status: RESOLVED | Noted: 2017-01-19 | Resolved: 2018-07-24

## 2018-07-31 ENCOUNTER — OUTPATIENT (OUTPATIENT)
Dept: OUTPATIENT SERVICES | Facility: HOSPITAL | Age: 78
LOS: 1 days | Discharge: HOME | End: 2018-07-31

## 2018-07-31 ENCOUNTER — APPOINTMENT (OUTPATIENT)
Dept: PODIATRY | Facility: CLINIC | Age: 78
End: 2018-07-31
Payer: MEDICARE

## 2018-07-31 VITALS
BODY MASS INDEX: 43.19 KG/M2 | WEIGHT: 220 LBS | DIASTOLIC BLOOD PRESSURE: 61 MMHG | HEART RATE: 72 BPM | HEIGHT: 60 IN | SYSTOLIC BLOOD PRESSURE: 92 MMHG

## 2018-07-31 DIAGNOSIS — M79.675 PAIN IN RIGHT TOE(S): ICD-10-CM

## 2018-07-31 DIAGNOSIS — M79.674 PAIN IN RIGHT TOE(S): ICD-10-CM

## 2018-07-31 DIAGNOSIS — L85.3 XEROSIS CUTIS: ICD-10-CM

## 2018-07-31 PROBLEM — E11.9 TYPE 2 DIABETES MELLITUS WITHOUT COMPLICATIONS: Chronic | Status: ACTIVE | Noted: 2018-04-11

## 2018-07-31 PROBLEM — O16.9 UNSPECIFIED MATERNAL HYPERTENSION, UNSPECIFIED TRIMESTER: Chronic | Status: ACTIVE | Noted: 2018-04-11

## 2018-07-31 PROBLEM — J45.909 UNSPECIFIED ASTHMA, UNCOMPLICATED: Chronic | Status: ACTIVE | Noted: 2018-04-11

## 2018-07-31 PROCEDURE — 99212 OFFICE O/P EST SF 10 MIN: CPT | Mod: 25

## 2018-07-31 PROCEDURE — 11721 DEBRIDE NAIL 6 OR MORE: CPT

## 2018-07-31 RX ORDER — AMMONIUM LACTATE 12 %
12 CREAM (GRAM) TOPICAL TWICE DAILY
Qty: 1 | Refills: 3 | Status: ACTIVE | COMMUNITY
Start: 2018-07-31 | End: 1900-01-01

## 2018-08-01 ENCOUNTER — LABORATORY RESULT (OUTPATIENT)
Age: 78
End: 2018-08-01

## 2018-08-02 DIAGNOSIS — M79.674 PAIN IN RIGHT TOE(S): ICD-10-CM

## 2018-08-02 DIAGNOSIS — B35.1 TINEA UNGUIUM: ICD-10-CM

## 2018-08-02 DIAGNOSIS — E11.22 TYPE 2 DIABETES MELLITUS WITH DIABETIC CHRONIC KIDNEY DISEASE: ICD-10-CM

## 2018-08-02 DIAGNOSIS — L85.3 XEROSIS CUTIS: ICD-10-CM

## 2018-08-02 LAB — 25(OH)D3 SERPL-MCNC: 9.8 NG/ML

## 2018-08-09 ENCOUNTER — OUTPATIENT (OUTPATIENT)
Dept: OUTPATIENT SERVICES | Facility: HOSPITAL | Age: 78
LOS: 1 days | Discharge: HOME | End: 2018-08-09

## 2018-08-09 ENCOUNTER — APPOINTMENT (OUTPATIENT)
Dept: ENDOCRINOLOGY | Facility: CLINIC | Age: 78
End: 2018-08-09

## 2018-08-09 VITALS — WEIGHT: 214 LBS | HEIGHT: 60 IN | BODY MASS INDEX: 42.01 KG/M2

## 2018-08-20 ENCOUNTER — APPOINTMENT (OUTPATIENT)
Dept: INTERNAL MEDICINE | Facility: CLINIC | Age: 78
End: 2018-08-20

## 2018-08-20 ENCOUNTER — OUTPATIENT (OUTPATIENT)
Dept: OUTPATIENT SERVICES | Facility: HOSPITAL | Age: 78
LOS: 1 days | Discharge: HOME | End: 2018-08-20

## 2018-08-20 VITALS
DIASTOLIC BLOOD PRESSURE: 74 MMHG | BODY MASS INDEX: 42.21 KG/M2 | HEIGHT: 60 IN | WEIGHT: 215 LBS | HEART RATE: 73 BPM | SYSTOLIC BLOOD PRESSURE: 118 MMHG

## 2018-08-20 DIAGNOSIS — M19.90 UNSPECIFIED OSTEOARTHRITIS, UNSPECIFIED SITE: ICD-10-CM

## 2018-08-20 NOTE — PHYSICAL EXAM
[No Acute Distress] : no acute distress [Well Nourished] : well nourished [Well Developed] : well developed [Well-Appearing] : well-appearing [Normal Sclera/Conjunctiva] : normal sclera/conjunctiva [PERRL] : pupils equal round and reactive to light [EOMI] : extraocular movements intact [Normal Outer Ear/Nose] : the outer ears and nose were normal in appearance [Normal Oropharynx] : the oropharynx was normal [Thyroid Normal, No Nodules] : the thyroid was normal and there were no nodules present [No Respiratory Distress] : no respiratory distress  [Clear to Auscultation] : lungs were clear to auscultation bilaterally [No Accessory Muscle Use] : no accessory muscle use [Normal Rate] : normal rate  [Normal S1, S2] : normal S1 and S2 [No Murmur] : no murmur heard [No Carotid Bruits] : no carotid bruits [No Abdominal Bruit] : a ~M bruit was not heard ~T in the abdomen [No Varicosities] : no varicosities [Pedal Pulses Present] : the pedal pulses are present [No Extremity Clubbing/Cyanosis] : no extremity clubbing/cyanosis [No Palpable Aorta] : no palpable aorta [Soft] : abdomen soft [Non Tender] : non-tender [No Masses] : no abdominal mass palpated [No HSM] : no HSM [Normal Posterior Cervical Nodes] : no posterior cervical lymphadenopathy [Normal Anterior Cervical Nodes] : no anterior cervical lymphadenopathy [No Rash] : no rash [Deep Tendon Reflexes (DTR)] : deep tendon reflexes were 2+ and symmetric [de-identified] :  bilateral LE edema

## 2018-08-20 NOTE — HISTORY OF PRESENT ILLNESS
[FreeTextEntry1] : follow up  [de-identified] : 78 year old female patient, PMH of HTN, DLD, Asthma< postherpetic neuralgia, CKD4, DM, presented today for\par regular follow up, she had asthma exacerbation on may and received prednisone, since that time she dosn’t have any \par wheezing or breathing difficulties. \par patient complaints of lower abdominal bulge and discomfort, she feels that the bulge is getting bigger with time,\par no constipation, no fever, normal stool color. \par she complaints also form bilateral lower extremities edema

## 2018-08-20 NOTE — ASSESSMENT
[FreeTextEntry1] :  Lower abdominal bulge and discomfort\par \par -referral to general surgery for possible hernia\par \par DM2\par - Patient is following with her Endocrinologist \par - uncontrolled, HbA1C is 8,7\par - Uptodate with podiatry \par - Will continue current medications \par - eye exam today \par \par HTN\par - Blood pressure on this visit is 118/92\par - Well controlled on Amlodipine\par \par  Hypercholesterolemia \par - on lipitor 40 mg\par \par CKD stage 4\par - Stable, follow up with nephro\par - last time seen by nephro; july 2017\par - refferal to nephrology for F/U\par \par Asthma \par - Patient does not complain of any shortness of breath\par -  symbicort, ventolin prn\par \par  Bilateral knee and OA \par Had TKR bilateral, doesn’t complain of any pain \par \par post herpetic neuralgia \par - on gabapentin, will decrease the dose to 600mg at bedtime as GFR is 22\par \par [facility] \par -patient is 78 year old\par script for shingrix \par

## 2018-08-20 NOTE — REVIEW OF SYSTEMS
[Joint Swelling] : joint swelling [Back Pain] : back pain [Negative] : Psychiatric [Joint Pain] : no joint pain [Joint Stiffness] : no joint stiffness [Muscle Pain] : no muscle pain

## 2018-08-21 DIAGNOSIS — R10.30 LOWER ABDOMINAL PAIN, UNSPECIFIED: ICD-10-CM

## 2018-08-21 DIAGNOSIS — N18.4 CHRONIC KIDNEY DISEASE, STAGE 4 (SEVERE): ICD-10-CM

## 2018-08-21 DIAGNOSIS — I10 ESSENTIAL (PRIMARY) HYPERTENSION: ICD-10-CM

## 2018-08-21 DIAGNOSIS — E11.29 TYPE 2 DIABETES MELLITUS WITH OTHER DIABETIC KIDNEY COMPLICATION: ICD-10-CM

## 2018-08-24 DIAGNOSIS — E11.65 TYPE 2 DIABETES MELLITUS WITH HYPERGLYCEMIA: ICD-10-CM

## 2018-08-24 DIAGNOSIS — E11.22 TYPE 2 DIABETES MELLITUS WITH DIABETIC CHRONIC KIDNEY DISEASE: ICD-10-CM

## 2018-08-24 DIAGNOSIS — E66.01 MORBID (SEVERE) OBESITY DUE TO EXCESS CALORIES: ICD-10-CM

## 2018-08-27 ENCOUNTER — APPOINTMENT (OUTPATIENT)
Dept: SURGERY | Facility: CLINIC | Age: 78
End: 2018-08-27
Payer: MEDICARE

## 2018-08-27 VITALS
HEIGHT: 60 IN | SYSTOLIC BLOOD PRESSURE: 126 MMHG | DIASTOLIC BLOOD PRESSURE: 84 MMHG | BODY MASS INDEX: 42.21 KG/M2 | WEIGHT: 215 LBS

## 2018-08-27 PROCEDURE — 99202 OFFICE O/P NEW SF 15 MIN: CPT

## 2018-08-28 ENCOUNTER — APPOINTMENT (OUTPATIENT)
Dept: NEPHROLOGY | Facility: CLINIC | Age: 78
End: 2018-08-28

## 2018-08-28 ENCOUNTER — OUTPATIENT (OUTPATIENT)
Dept: OUTPATIENT SERVICES | Facility: HOSPITAL | Age: 78
LOS: 1 days | Discharge: HOME | End: 2018-08-28

## 2018-08-28 VITALS
TEMPERATURE: 98.5 F | HEART RATE: 76 BPM | HEIGHT: 60 IN | WEIGHT: 216 LBS | BODY MASS INDEX: 42.41 KG/M2 | SYSTOLIC BLOOD PRESSURE: 140 MMHG | DIASTOLIC BLOOD PRESSURE: 83 MMHG

## 2018-08-28 DIAGNOSIS — N18.9 CHRONIC KIDNEY DISEASE, UNSPECIFIED: ICD-10-CM

## 2018-08-28 NOTE — REVIEW OF SYSTEMS
[Frequency] : frequency [Joint Pain] : joint pain [Fever] : no fever [Vision Problems] : no vision problems [Chest Pain] : no chest pain [Palpitations] : no palpitations [Orthopnea] : no orthopnea [Shortness Of Breath] : no shortness of breath [Wheezing] : no wheezing [Abdominal Pain] : no abdominal pain [Skin Rash] : no skin rash

## 2018-08-28 NOTE — HISTORY OF PRESENT ILLNESS
[FreeTextEntry1] : ckd followup [de-identified] : 79 y/o F w/ PMH of HTN, DLD, Asthma< postherpetic neuralgia, CKD4, DM presenting for followup. Most recent follow up was in July 2017 with Dr Kleiner. Most recent bmp from 8/2018 shows Cr 2.4, GFR 22. Prior bloodwork from 4/201 showed Cr 1.9. Last UA from 2017 showed protein 100.

## 2018-08-28 NOTE — ASSESSMENT
[FreeTextEntry1] : 1. CKD Stage 4, +HTN, +DMII\par -check UA w/ reflex, protein/Cr ratiom CMP, gina, phos, pth, cbc, iron studies, spep/upep\par -check renal us\par -start lasix 20mg q24\par -start renal diet+low carb consistent diet, dietary referral\par -return to renal clinic once every month\par -return to medical clinic for better diabetes and blood pressure control

## 2018-08-28 NOTE — PHYSICAL EXAM
[No Acute Distress] : no acute distress [Normal Sclera/Conjunctiva] : normal sclera/conjunctiva [Normal Outer Ear/Nose] : the outer ears and nose were normal in appearance [No Respiratory Distress] : no respiratory distress  [Normal Rate] : normal rate  [Regular Rhythm] : with a regular rhythm [Soft] : abdomen soft [Non Tender] : non-tender [No HSM] : no HSM [Normal Bowel Sounds] : normal bowel sounds [No Rash] : no rash [Normal Gait] : normal gait [Normal Mood] : the mood was normal [Normal Insight/Judgement] : insight and judgment were intact [de-identified] : +1 LE edema bilaterally [de-identified] : +obese

## 2018-09-13 ENCOUNTER — LABORATORY RESULT (OUTPATIENT)
Age: 78
End: 2018-09-13

## 2018-09-18 LAB
ALBUMIN MFR SERPL ELPH: 51 %
ALBUMIN SERPL ELPH-MCNC: 4 G/DL
ALBUMIN SERPL-MCNC: 3.6 G/DL
ALBUMIN/GLOB SERPL: 1 RATIO
ALP BLD-CCNC: 133 U/L
ALPHA1 GLOB MFR SERPL ELPH: 4.2 %
ALPHA1 GLOB SERPL ELPH-MCNC: 0.3 G/DL
ALPHA2 GLOB MFR SERPL ELPH: 16.4 %
ALPHA2 GLOB SERPL ELPH-MCNC: 1.2 G/DL
ALT SERPL-CCNC: 12 U/L
ANION GAP SERPL CALC-SCNC: 15 MMOL/L
APPEARANCE: CLEAR
AST SERPL-CCNC: 16 U/L
B-GLOBULIN MFR SERPL ELPH: 13.4 %
B-GLOBULIN SERPL ELPH-MCNC: 1 G/DL
BASOPHILS # BLD AUTO: 0.03 K/UL
BASOPHILS NFR BLD AUTO: 0.5 %
BILIRUB SERPL-MCNC: <0.2 MG/DL
BILIRUBIN URINE: NEGATIVE
BLOOD URINE: NEGATIVE
BUN SERPL-MCNC: 39 MG/DL
CALCIUM SERPL-MCNC: 9.1 MG/DL
CALCIUM SERPL-MCNC: 9.1 MG/DL
CHLORIDE SERPL-SCNC: 104 MMOL/L
CO2 SERPL-SCNC: 21 MMOL/L
COLOR: YELLOW
CREAT 24H UR-MCNC: NORMAL G/24 H
CREAT SERPL-MCNC: 2 MG/DL
CREAT SPEC-SCNC: 171 MG/DL
CREAT/PROT UR: >3.3 RATIO
CREATININE UR (MAYO): 159 MG/DL
EOSINOPHIL # BLD AUTO: 0.2 K/UL
EOSINOPHIL NFR BLD AUTO: 3.6 %
FERRITIN SERPL-MCNC: 101 NG/ML
GAMMA GLOB FLD ELPH-MCNC: 1.1 G/DL
GAMMA GLOB MFR SERPL ELPH: 15 %
GLUCOSE QUALITATIVE U: 100 MG/DL
GLUCOSE SERPL-MCNC: 181 MG/DL
HCT VFR BLD CALC: 38.4 %
HGB BLD-MCNC: 11.9 G/DL
IMM GRANULOCYTES NFR BLD AUTO: 0.5 %
INTERPRETATION SERPL IEP-IMP: NORMAL
IRON SATN MFR SERPL: 18 %
IRON SERPL-MCNC: 55 UG/DL
KAPPA LC 24H UR QL: NORMAL
KETONES URINE: NEGATIVE
LEUKOCYTE ESTERASE URINE: NEGATIVE
LYMPHOCYTES # BLD AUTO: 1.09 K/UL
LYMPHOCYTES NFR BLD AUTO: 19.6 %
MAN DIFF?: NORMAL
MCHC RBC-ENTMCNC: 26.4 PG
MCHC RBC-ENTMCNC: 31 G/DL
MCV RBC AUTO: 85.3 FL
MONOCYTES # BLD AUTO: 0.68 K/UL
MONOCYTES NFR BLD AUTO: 12.3 %
NEUTROPHILS # BLD AUTO: 3.52 K/UL
NEUTROPHILS NFR BLD AUTO: 63.5 %
NITRITE URINE: NEGATIVE
PARATHYROID HORMONE INTACT: 233 PG/ML
PH URINE: 6.5
PHOSPHATE SERPL-MCNC: 4.2 MG/DL
PLATELET # BLD AUTO: 290 K/UL
POTASSIUM SERPL-SCNC: 5.8 MMOL/L
PROT SERPL-MCNC: 7.1 G/DL
PROT SERPL-MCNC: 7.1 G/DL
PROT SERPL-MCNC: 7.2 G/DL
PROT UR-MCNC: 730 MG/DL
PROT UR-MCNC: >564 MG/DLG/24H
PROT UR-MCNC: NORMAL MG/DL
PROTEIN URINE: >=300
RBC # BLD: 4.5 M/UL
RBC # FLD: 15.7 %
SODIUM SERPL-SCNC: 140 MMOL/L
SPECIFIC GRAVITY URINE: 1.01
TIBC SERPL-MCNC: 312 UG/DL
TRANSFERRIN SERPL-MCNC: 255 MG/DL
UIBC SERPL-MCNC: 257 UG/DL
UROBILINOGEN URINE: 0.2 (ref 0.2–?)
WBC # FLD AUTO: 5.55 K/UL

## 2018-09-21 ENCOUNTER — FORM ENCOUNTER (OUTPATIENT)
Age: 78
End: 2018-09-21

## 2018-09-22 ENCOUNTER — OUTPATIENT (OUTPATIENT)
Dept: OUTPATIENT SERVICES | Facility: HOSPITAL | Age: 78
LOS: 1 days | Discharge: HOME | End: 2018-09-22

## 2018-09-22 DIAGNOSIS — K46.9 UNSPECIFIED ABDOMINAL HERNIA WITHOUT OBSTRUCTION OR GANGRENE: ICD-10-CM

## 2018-10-02 ENCOUNTER — APPOINTMENT (OUTPATIENT)
Dept: NEPHROLOGY | Facility: CLINIC | Age: 78
End: 2018-10-02

## 2018-10-03 ENCOUNTER — OUTPATIENT (OUTPATIENT)
Dept: OUTPATIENT SERVICES | Facility: HOSPITAL | Age: 78
LOS: 1 days | Discharge: HOME | End: 2018-10-03

## 2018-10-04 ENCOUNTER — OUTPATIENT (OUTPATIENT)
Dept: OUTPATIENT SERVICES | Facility: HOSPITAL | Age: 78
LOS: 1 days | Discharge: HOME | End: 2018-10-04

## 2018-10-04 ENCOUNTER — APPOINTMENT (OUTPATIENT)
Dept: ENDOCRINOLOGY | Facility: CLINIC | Age: 78
End: 2018-10-04

## 2018-10-04 VITALS
HEART RATE: 89 BPM | DIASTOLIC BLOOD PRESSURE: 85 MMHG | BODY MASS INDEX: 40.25 KG/M2 | SYSTOLIC BLOOD PRESSURE: 168 MMHG | WEIGHT: 205 LBS | HEIGHT: 60 IN

## 2018-10-09 DIAGNOSIS — E66.01 MORBID (SEVERE) OBESITY DUE TO EXCESS CALORIES: ICD-10-CM

## 2018-10-09 DIAGNOSIS — E11.65 TYPE 2 DIABETES MELLITUS WITH HYPERGLYCEMIA: ICD-10-CM

## 2018-10-09 DIAGNOSIS — E11.21 TYPE 2 DIABETES MELLITUS WITH DIABETIC NEPHROPATHY: ICD-10-CM

## 2018-10-30 ENCOUNTER — OUTPATIENT (OUTPATIENT)
Dept: OUTPATIENT SERVICES | Facility: HOSPITAL | Age: 78
LOS: 1 days | Discharge: HOME | End: 2018-10-30

## 2018-10-30 ENCOUNTER — APPOINTMENT (OUTPATIENT)
Dept: NEPHROLOGY | Facility: CLINIC | Age: 78
End: 2018-10-30

## 2018-10-30 VITALS
HEART RATE: 71 BPM | WEIGHT: 208 LBS | HEIGHT: 60 IN | TEMPERATURE: 96.8 F | SYSTOLIC BLOOD PRESSURE: 152 MMHG | BODY MASS INDEX: 40.84 KG/M2 | DIASTOLIC BLOOD PRESSURE: 82 MMHG

## 2018-11-25 ENCOUNTER — RX RENEWAL (OUTPATIENT)
Age: 78
End: 2018-11-25

## 2018-12-08 ENCOUNTER — RX RENEWAL (OUTPATIENT)
Age: 78
End: 2018-12-08

## 2019-01-07 ENCOUNTER — RX RENEWAL (OUTPATIENT)
Age: 79
End: 2019-01-07

## 2019-01-25 ENCOUNTER — EMERGENCY (EMERGENCY)
Facility: HOSPITAL | Age: 79
LOS: 0 days | Discharge: HOME | End: 2019-01-25
Attending: EMERGENCY MEDICINE | Admitting: EMERGENCY MEDICINE

## 2019-01-25 VITALS
OXYGEN SATURATION: 98 % | HEART RATE: 89 BPM | TEMPERATURE: 98 F | DIASTOLIC BLOOD PRESSURE: 58 MMHG | SYSTOLIC BLOOD PRESSURE: 120 MMHG | RESPIRATION RATE: 18 BRPM

## 2019-01-25 VITALS — WEIGHT: 208.12 LBS

## 2019-01-25 DIAGNOSIS — Z79.899 OTHER LONG TERM (CURRENT) DRUG THERAPY: ICD-10-CM

## 2019-01-25 DIAGNOSIS — J45.909 UNSPECIFIED ASTHMA, UNCOMPLICATED: ICD-10-CM

## 2019-01-25 DIAGNOSIS — E11.9 TYPE 2 DIABETES MELLITUS WITHOUT COMPLICATIONS: ICD-10-CM

## 2019-01-25 DIAGNOSIS — I10 ESSENTIAL (PRIMARY) HYPERTENSION: ICD-10-CM

## 2019-01-25 DIAGNOSIS — M25.519 PAIN IN UNSPECIFIED SHOULDER: ICD-10-CM

## 2019-01-25 DIAGNOSIS — Z79.84 LONG TERM (CURRENT) USE OF ORAL HYPOGLYCEMIC DRUGS: ICD-10-CM

## 2019-01-25 DIAGNOSIS — Z79.82 LONG TERM (CURRENT) USE OF ASPIRIN: ICD-10-CM

## 2019-01-25 DIAGNOSIS — M25.511 PAIN IN RIGHT SHOULDER: ICD-10-CM

## 2019-01-25 DIAGNOSIS — Z79.51 LONG TERM (CURRENT) USE OF INHALED STEROIDS: ICD-10-CM

## 2019-01-25 DIAGNOSIS — Z79.52 LONG TERM (CURRENT) USE OF SYSTEMIC STEROIDS: ICD-10-CM

## 2019-01-25 DIAGNOSIS — Z79.1 LONG TERM (CURRENT) USE OF NON-STEROIDAL ANTI-INFLAMMATORIES (NSAID): ICD-10-CM

## 2019-01-25 RX ORDER — OXYCODONE AND ACETAMINOPHEN 5; 325 MG/1; MG/1
1 TABLET ORAL ONCE
Qty: 0 | Refills: 0 | Status: DISCONTINUED | OUTPATIENT
Start: 2019-01-25 | End: 2019-01-25

## 2019-01-25 RX ADMIN — OXYCODONE AND ACETAMINOPHEN 1 TABLET(S): 5; 325 TABLET ORAL at 13:51

## 2019-01-25 NOTE — ED PROVIDER NOTE - CARE PROVIDER_API CALL
Angelo Roajs), Orthopaedic Surgery  [facility] [place] [address]  Phone: [phone]  Fax: [phone]

## 2019-01-25 NOTE — ED PROVIDER NOTE - MEDICAL DECISION MAKING DETAILS
79yo woman h/o HTN, COPD, DM, CKD c/o R shoulder pain x 2 days without injury. Pain is worse with range of motion, no weakness/numbness. Took tylenol, but no improvement. On exam pt is nontoxic appearing, uncomfortable due to pain. Unable to range shoulder without severe discomfort, distal NVI/pulses intact. Likely bursitis vs adhesive capsulitis vs arthritis. XR no fracture, just DJD. Pt cannot take NSAIDS. Given brief course opioids and ortho f/u.

## 2019-01-25 NOTE — ED PROVIDER NOTE - PHYSICAL EXAMINATION
CONST: Well appearing in NAD  NECK: Non-tender, no meningeal signs  CARD: Normal S1 S2; Normal rate and rhythm  RESP: Equal BS B/L, No wheezes, rhonchi or rales. No distress  GI: Soft, non-tender, non-distended.  MS: right shoulder with anterior tenderness with pain on ROM. No redness or swelling. NV intact distally  SKIN: Warm, dry, no acute rashes. Good turgor  NEURO: A&Ox3, No focal deficits. Strength 5/5 with no sensory deficits. Steady gait

## 2019-01-25 NOTE — ED PROVIDER NOTE - OBJECTIVE STATEMENT
right shoulder pain x 2 days. No trauma. Pain on ROM. No fever or weakness or numbness. No relief with tylenol. Has hx of DM, HTN, asthma, CKD

## 2019-01-30 ENCOUNTER — EMERGENCY (EMERGENCY)
Facility: HOSPITAL | Age: 79
LOS: 0 days | Discharge: HOME | End: 2019-01-30
Attending: EMERGENCY MEDICINE | Admitting: EMERGENCY MEDICINE

## 2019-01-30 VITALS
OXYGEN SATURATION: 98 % | SYSTOLIC BLOOD PRESSURE: 177 MMHG | DIASTOLIC BLOOD PRESSURE: 81 MMHG | HEART RATE: 91 BPM | TEMPERATURE: 96 F | RESPIRATION RATE: 18 BRPM

## 2019-01-30 DIAGNOSIS — I10 ESSENTIAL (PRIMARY) HYPERTENSION: ICD-10-CM
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
## 2019-01-30 DIAGNOSIS — M25.431 EFFUSION, RIGHT WRIST: ICD-10-CM

## 2019-01-30 DIAGNOSIS — M25.511 PAIN IN RIGHT SHOULDER: ICD-10-CM

## 2019-01-30 DIAGNOSIS — E78.5 HYPERLIPIDEMIA, UNSPECIFIED: ICD-10-CM
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
## 2019-01-30 DIAGNOSIS — E11.9 TYPE 2 DIABETES MELLITUS WITHOUT COMPLICATIONS: ICD-10-CM

## 2019-01-30 DIAGNOSIS — M25.531 PAIN IN RIGHT WRIST: ICD-10-CM

## 2019-01-30 DIAGNOSIS — J45.909 UNSPECIFIED ASTHMA, UNCOMPLICATED: ICD-10-CM

## 2019-01-30 DIAGNOSIS — M25.551 PAIN IN RIGHT HIP: ICD-10-CM
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
## 2019-01-30 RX ORDER — SODIUM CHLORIDE 9 MG/ML
500 INJECTION, SOLUTION INTRAVENOUS
Qty: 0 | Refills: 0 | Status: DISCONTINUED | OUTPATIENT
Start: 2019-01-30 | End: 2019-01-30

## 2019-01-30 NOTE — ED PROVIDER NOTE - CARE PLAN
Principal Discharge DX:	Fall  Secondary Diagnosis:	Wrist pain, acute, right  Secondary Diagnosis:	Shoulder pain, right

## 2019-01-30 NOTE — ED ADULT TRIAGE NOTE - CHIEF COMPLAINT QUOTE
pt fell last night, no anticoagulants. fell onto back, denies LOC. GCS=15. does not take anticoagulants, takes Aspirin daily

## 2019-01-30 NOTE — ED PROVIDER NOTE - NSFOLLOWUPCLINICS_GEN_ALL_ED_FT
[facility]  Orthpedic  [address] [place], [place]   Phone: [phone]  Fax:   Follow Up Time: 4-6 Days

## 2019-01-30 NOTE — ED PROVIDER NOTE - OBJECTIVE STATEMENT
77 yo female with PMH DM, HTN, asthma, HLD, presents c/o right wrist and shoulder pain with right hip pain s/p fall yesterday. Pt states she was going into a store with her nephew and slipped landing on her right side. Pt states she fell forward and hit her head; denies any LOC, dizziness, HA, visual complaints or N/V. No preceding dizziness, CP, SOB, palpitations. No fevers, cough or URI sx. Pt denied any abdominal pain, neck or back pain. Ambulating at baseline with cane. Tolerating PO as usual.

## 2019-01-30 NOTE — ED ADULT NURSE NOTE - OBJECTIVE STATEMENT
pt presents to ED c/o fall at home last night, no anticoagulants, denies LOC. Pt c/o right shoulder pain, ambulates with cane.

## 2019-01-30 NOTE — ED PROVIDER NOTE - MEDICAL DECISION MAKING DETAILS
Pt refused all labs. Pt also refused IV contrast. Imaging negative for acute injury on CT and XR. Pt reported feeling well to go home and had someone picking her up in ED. Given negative imaging, pt cleared for d/c but advised to follow up closely and pt agreed. Return precautions advised and pt verbalized understanding.

## 2019-01-30 NOTE — ED PROVIDER NOTE - PHYSICAL EXAMINATION
VITAL SIGNS: noted  CONSTITUTIONAL: Well-developed; well-nourished; in no acute distress  HEAD: Normocephalic; atraumatic  EYES: PERRL, EOM intact; conjunctiva and sclera clear  ENT: No nasal discharge; airway clear. MMM  NECK: Supple; non tender. No anterior cervical lymphadenopathy noted  CARD: S1, S2 normal; no murmurs, gallops, or rubs. Regular rate and rhythm  RESP: CTAB/L, no wheezes, rales or rhonchi  ABD: Normal bowel sounds; soft; non-distended; non-tender; no hepatosplenomegaly. No CVA tenderness  EXT: Normal ROM. No calf tenderness or edema. Distal pulses intact  NEURO: Alert, oriented. Grossly unremarkable. No focal deficits  SKIN: Skin exam is warm and dry, no acute rash  MS: No midline spinal tenderness, right hand and wrist with mild swelling, slight decreased ROM, no ecchymoses; + tenderness to right shoulder with limited ROM, no hip tenderness, + FROM hips B/L

## 2019-01-30 NOTE — ED PROVIDER NOTE - NS ED ROS FT
Constitutional: see HPI  Eyes:  No visual changes, eye pain or discharge.  ENMT:  No hearing changes, pain, discharge or infections. No neck pain or stiffness.  Cardiac:  No chest pain, SOB or edema. No chest pain with exertion.  Respiratory:  No cough or respiratory distress. No hemoptysis.  +history of asthma    GI:  No nausea, vomiting, diarrhea or abdominal pain.  :  No dysuria, frequency or burning.  MS:  see HPI  Neuro:  No headache or weakness.  No LOC.  Skin:  No skin rash.   Endocrine: No history of thyroid disease; + diabetes.

## 2019-02-05 ENCOUNTER — APPOINTMENT (OUTPATIENT)
Dept: NEPHROLOGY | Facility: CLINIC | Age: 79
End: 2019-02-05

## 2019-02-05 ENCOUNTER — OUTPATIENT (OUTPATIENT)
Dept: OUTPATIENT SERVICES | Facility: HOSPITAL | Age: 79
LOS: 1 days | Discharge: HOME | End: 2019-02-05

## 2019-02-05 VITALS
HEIGHT: 60 IN | WEIGHT: 207 LBS | DIASTOLIC BLOOD PRESSURE: 82 MMHG | SYSTOLIC BLOOD PRESSURE: 129 MMHG | HEART RATE: 71 BPM | BODY MASS INDEX: 40.64 KG/M2

## 2019-02-05 DIAGNOSIS — N18.9 CHRONIC KIDNEY DISEASE, UNSPECIFIED: ICD-10-CM

## 2019-02-05 NOTE — ASSESSMENT
[FreeTextEntry1] : #)CKD Stage 4, +HTN, +DMII uncontrolled Hba1C WAS 8.7\par Inc protein in urine >300, inc TP/creatinine ration of 3.3, Inc , Inc , creatinine of 2.0 stable\par patient was started on Lasix last visit , patient was supposed to repeat CMP before this visit , which she did not.\par  -start on lisinopril 10 mg OD, repeat bmp today\par check protein to creatinine ratio\par will refer to dietitian for renal and low potassium diet\par \par #)Hypertension stage II\par c/w amlodipine, continue with Lasix, will add lisinopril\par \par #f/u in 2 weeks

## 2019-02-05 NOTE — PHYSICAL EXAM
[General Appearance - Alert] : alert [Sclera] : the sclera and conjunctiva were normal [Exaggerated Use Of Accessory Muscles For Inspiration] : no accessory muscle use [Auscultation Breath Sounds / Voice Sounds] : lungs were clear to auscultation bilaterally [Heart Rate And Rhythm] : heart rate was normal and rhythm regular [Heart Sounds] : normal S1 and S2 [Bowel Sounds] : normal bowel sounds [Abdomen Soft] : soft [Abdomen Tenderness] : non-tender [] : no hepato-splenomegaly [No CVA Tenderness] : no ~M costovertebral angle tenderness [No Focal Deficits] : no focal deficits [Oriented To Time, Place, And Person] : oriented to person, place, and time [FreeTextEntry1] : less hearing on left side

## 2019-02-05 NOTE — HISTORY OF PRESENT ILLNESS
[FreeTextEntry1] : 77 y/o F w/ PMH of HTN, DLD, Asthma< postherpetic neuralgia, CKD4, DM presenting for followup. Most recent follow up was in oct 2018 where she was found to have Inc protein in urine >300, inc TP/creatinine ration of 3.3, Inc , Inc , creatinine of 2.0 stable , patietn was started on  lasix 20mg q24,  ACE/ARB ws not started  b/c of inc potassium, renal diet+low carb consistent diet,low potassium diet was recommended and renal and bladder ultrasound was repeated. Patient was supposed to repeat CMP before coming,

## 2019-02-07 ENCOUNTER — OUTPATIENT (OUTPATIENT)
Dept: OUTPATIENT SERVICES | Facility: HOSPITAL | Age: 79
LOS: 1 days | Discharge: HOME | End: 2019-02-07

## 2019-02-07 ENCOUNTER — APPOINTMENT (OUTPATIENT)
Dept: NUTRITION | Facility: CLINIC | Age: 79
End: 2019-02-07

## 2019-02-07 ENCOUNTER — APPOINTMENT (OUTPATIENT)
Dept: ENDOCRINOLOGY | Facility: CLINIC | Age: 79
End: 2019-02-07

## 2019-02-07 VITALS
SYSTOLIC BLOOD PRESSURE: 130 MMHG | BODY MASS INDEX: 40.05 KG/M2 | WEIGHT: 204 LBS | DIASTOLIC BLOOD PRESSURE: 65 MMHG | HEIGHT: 60 IN

## 2019-02-07 DIAGNOSIS — M79.672 PAIN IN LEFT FOOT: ICD-10-CM

## 2019-02-07 DIAGNOSIS — M79.671 PAIN IN RIGHT FOOT: ICD-10-CM

## 2019-02-07 DIAGNOSIS — B35.1 TINEA UNGUIUM: ICD-10-CM

## 2019-02-07 NOTE — HISTORY OF PRESENT ILLNESS
[FreeTextEntry1] : 79 yr F with hx of D.M with neuropathy and CKD -4,HTN ,DLD  is here for follow up after 10/18 .\par Today she has complaint of pain in RT shoulder after a fall and has a recent ER visit for that .\par her last Hga1 c was checked in 8/1/18 and it was 8.7 .recent Cr at baseline ,high Pr /Cr ratio in urine with albuminuria .\par Taking Pioglitazone 30 ,with Victoza and Toujeo 30 units .\par taking gabapentin for neuropathy,and has c/o occasional numbness .and c/p polyuria .\par  Alert/Awake/Cooperative

## 2019-02-07 NOTE — ASSESSMENT
[Carbohydrate Consistent Diet] : carbohydrate consistent diet [Diabetes Foot Care] : diabetes foot care [Long Term Vascular Complications] : long term vascular complications of diabetes [Importance of Diet and Exercise] : importance of diet and exercise to improve glycemic control, achieve weight loss and improve cardiovascular health [Action and use of Insulin] : action and use of short and long-acting insulin [FreeTextEntry1] : # D.M with neuropathy ;\par cw Pioglitazone and Victoza at same dose \par will increase Toujeo to 40 units now \par will check HgA1 c before next visit \par follow up in 3 mnths \par  \par

## 2019-02-08 ENCOUNTER — APPOINTMENT (OUTPATIENT)
Dept: PODIATRY | Facility: CLINIC | Age: 79
End: 2019-02-08
Payer: MEDICARE

## 2019-02-08 PROCEDURE — 11721 DEBRIDE NAIL 6 OR MORE: CPT

## 2019-02-11 NOTE — PHYSICAL EXAM
[General Appearance - Alert] : alert [General Appearance - In No Acute Distress] : in no acute distress [Full Pulse] : the pedal pulses are present [FreeTextEntry1] : dystrophic, thick nails x 10. 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 no open lesions appreciated

## 2019-02-11 NOTE — REASON FOR VISIT
[Follow-Up] : a follow-up visit [FreeTextEntry1] : treatment of thick, dystrophic nails and dry skin bilateral feet.

## 2019-02-11 NOTE — REVIEW OF SYSTEMS
[Change In Skin Texture] : change in skin texture [Toenail Deformity] : toenail deformity [Toenail Discoloration] : toenail discoloration [Toenail Thickening] : toenail thickening [Negative] : Constitutional

## 2019-02-11 NOTE — ASSESSMENT
[FreeTextEntry1] : -Aseptic debridement of all fungal nails.  Patient has pain about the toes secondary to nail pressure and relates improvement with periodic debridement.\par -Renewed  Ammonium lactate 12% to apply twice a day for dry skin\par -PTR 3 months

## 2019-02-12 LAB
ANION GAP SERPL CALC-SCNC: 15 MMOL/L
BUN SERPL-MCNC: 56 MG/DL
CALCIUM SERPL-MCNC: 8.6 MG/DL
CHLORIDE SERPL-SCNC: 105 MMOL/L
CO2 SERPL-SCNC: 21 MMOL/L
CREAT SERPL-MCNC: 2.2 MG/DL
CREAT SPEC-SCNC: 98 MG/DL
CREAT/PROT UR: 3 RATIO
GLUCOSE SERPL-MCNC: 167 MG/DL
POTASSIUM SERPL-SCNC: 4.9 MMOL/L
PROT UR-MCNC: 294 MG/DLG/24H
SODIUM SERPL-SCNC: 141 MMOL/L

## 2019-02-18 ENCOUNTER — RX RENEWAL (OUTPATIENT)
Age: 79
End: 2019-02-18

## 2019-02-19 ENCOUNTER — APPOINTMENT (OUTPATIENT)
Dept: NEPHROLOGY | Facility: CLINIC | Age: 79
End: 2019-02-19

## 2019-02-19 ENCOUNTER — OUTPATIENT (OUTPATIENT)
Dept: OUTPATIENT SERVICES | Facility: HOSPITAL | Age: 79
LOS: 1 days | Discharge: HOME | End: 2019-02-19

## 2019-02-19 VITALS
DIASTOLIC BLOOD PRESSURE: 81 MMHG | HEIGHT: 60 IN | BODY MASS INDEX: 40.44 KG/M2 | HEART RATE: 67 BPM | WEIGHT: 206 LBS | SYSTOLIC BLOOD PRESSURE: 170 MMHG

## 2019-02-19 LAB — GLUCOSE BLDC GLUCOMTR-MCNC: 140 MG/DL — HIGH (ref 70–99)

## 2019-02-19 RX ORDER — ALBUTEROL 90 MCG
90 AEROSOL (GRAM) INHALATION 4 TIMES DAILY
Refills: 0 | Status: COMPLETED | COMMUNITY
End: 2019-02-19

## 2019-02-19 NOTE — ASSESSMENT
[FreeTextEntry1] : #)CKD Stage 4, +HTN, +DMII uncontrolled Hba1C \par \par - pt is non-compliant with meds, likely d/t lack of understanding. Called [facility] pharmacy and confirmed that she has script for Lisinopril but was never picked up. Had Pharmacist Sapphire  her and also confirm the med list. \par - Urine:Cr worsening whichc is expected as she isn't taking meds and also uncontrolled HTN. \par - refilled her meds, and she will f/u with Geriatrics tomorrow\par \par #)Hypertension stage II\par c/w amlodipine, started on lasix and lisinopril \par \par # RTC in 3 months

## 2019-02-19 NOTE — PHYSICAL EXAM
[No Acute Distress] : no acute distress [No JVD] : no jugular venous distention [No Respiratory Distress] : no respiratory distress  [Clear to Auscultation] : lungs were clear to auscultation bilaterally [Normal Rate] : normal rate  [Regular Rhythm] : with a regular rhythm [No Carotid Bruits] : no carotid bruits [Soft] : abdomen soft [Non Tender] : non-tender [No CVA Tenderness] : no CVA  tenderness [No Spinal Tenderness] : no spinal tenderness [No Focal Deficits] : no focal deficits [de-identified] : 1-2+ edema LE

## 2019-02-19 NOTE — HISTORY OF PRESENT ILLNESS
[FreeTextEntry1] : 79 y/o F w/ PMH of HTN, DLD, Asthma, postherpetic neuralgia, CKD4, DM presenting for followup. She has increased protein Cr ratio on the last visit and was prescribed lisinopril. 10 mg. Pt isn't taking lisinopril and also not taking Lasix that was prescribed for HTN. \par She was not feeling well this morning in the clinc and reported to be dizzy. HEr BP was 170/80 with HR 62 regular and . She started to feel better after conversation started. \par \par ROS: negative except polyurea

## 2019-02-20 ENCOUNTER — APPOINTMENT (OUTPATIENT)
Dept: GERIATRICS | Facility: CLINIC | Age: 79
End: 2019-02-20

## 2019-02-20 ENCOUNTER — OUTPATIENT (OUTPATIENT)
Dept: OUTPATIENT SERVICES | Facility: HOSPITAL | Age: 79
LOS: 1 days | Discharge: HOME | End: 2019-02-20

## 2019-02-20 VITALS
SYSTOLIC BLOOD PRESSURE: 124 MMHG | HEART RATE: 72 BPM | WEIGHT: 206 LBS | BODY MASS INDEX: 40.44 KG/M2 | DIASTOLIC BLOOD PRESSURE: 76 MMHG | TEMPERATURE: 98.3 F | HEIGHT: 60 IN

## 2019-02-20 DIAGNOSIS — M25.539 PAIN IN UNSPECIFIED WRIST: ICD-10-CM

## 2019-02-20 RX ORDER — GABAPENTIN 600 MG/1
600 TABLET, COATED ORAL AT BEDTIME
Qty: 30 | Refills: 3 | Status: DISCONTINUED | COMMUNITY
Start: 2018-08-20 | End: 2019-02-20

## 2019-02-20 RX ORDER — LANCETS 33 GAUGE
EACH MISCELLANEOUS
Qty: 300 | Refills: 0 | Status: COMPLETED | COMMUNITY
Start: 2017-09-15 | End: 2019-02-20

## 2019-02-20 RX ORDER — GABAPENTIN 600 MG/1
600 TABLET, COATED ORAL 4 TIMES DAILY
Qty: 360 | Refills: 1 | Status: DISCONTINUED | COMMUNITY
Start: 2017-09-28 | End: 2019-02-20

## 2019-02-20 RX ORDER — AMMONIUM LACTATE 12 %
12 CREAM (GRAM) TOPICAL TWICE DAILY
Qty: 1 | Refills: 3 | Status: COMPLETED | COMMUNITY
Start: 2018-04-24 | End: 2019-02-20

## 2019-02-20 RX ORDER — ZOSTER VACCINE RECOMBINANT, ADJUVANTED 50 MCG/0.5
50 KIT INTRAMUSCULAR
Qty: 1 | Refills: 0 | Status: COMPLETED | COMMUNITY
Start: 2018-10-04 | End: 2019-02-20

## 2019-02-20 NOTE — HISTORY OF PRESENT ILLNESS
[FreeTextEntry1] : 77 y/o F w/ PMH of Controlled HTN on meds, DLD on statins, Asthma not in exacerbation, postherpetic neuralgia on gabapentin, CKD4, DM II on insulin presenting for follow up with c/o back pain after shingles that she had 2 years ago. Patient is c/o R sided wrist pain for the last few months. Her traumatic work up was negative and now it is swollen, No erythema, no decrease in mobility, no fever, no chills.\par As of now she was explained the medications that she is going to take.

## 2019-02-20 NOTE — ASSESSMENT
[Daily physical exercise as tolerated] : Daily physical exercise as tolerated [Pain Management] : pain management [Medication Management] : medication management [FreeTextEntry1] : Uncontrolled DM II (HbA1C is 8.7 last yr)\par \par - Patient is following with her Endocrinologist \par - Uptodate with podiatry\par -Referral for eye exam given today\par - Will continue with Lantus 40 units, Pioglitazone and Victoza\par -Will repeat HgA1C\par \par post herpetic neuralgia \par - on gabapentin, will stop as of now because of dizziness and CKD stage 4\par -will start on Lidocaine patch daily, d/c gabapentin today\par \par R wrist swelling and pain: Likely post traumatic, No fractures noted in past according to patient\par Will start on tylenol 1000 mg BID and avoid NSAID\par Cold compresses daily\par \par HTN controlled as of now\par - c/w Amlodipine, Lisinopril and lasix\par \par  Hypercholesterolemia \par - c/w lipitor 40 mg\par -Stop ASA 81 (No CAD)\par - Lipid profile\par \par CKD stage 4\par - Stable, follow up with nephro (Dr. Kleiner)\par \par Asthma \par - Patient does not complain of any shortness of breath\par - symbicort, ventolin prn\par \par \par HCM\par Uptodate with Flu vaccine/Shingrix\par Colonoscopy last yr was normal according to patient\par Mammogram uptodate\par \par follow up in 1 month\par \par \par .

## 2019-02-20 NOTE — SOCIAL HISTORY
[No falls in past year] : Patient reported no falls in the past year [Fully functional (bathing, dressing, toileting, transferring, walking, feeding)] : Fully functional (bathing, dressing, toileting, transferring, walking, feeding) [Independent] : using telephone [Some assistance needed] : managing finances [FreeTextEntry4] : intact

## 2019-02-20 NOTE — PHYSICAL EXAM
[General Appearance - Alert] : alert [Sclera] : the sclera and conjunctiva were normal [Extraocular Movements] : extraocular movements were intact [Normal Oral Mucosa] : normal oral mucosa [Outer Ear] : the ears and nose were normal in appearance [Neck Appearance] : the appearance of the neck was normal [Respiration, Rhythm And Depth] : normal respiratory rhythm and effort [Exaggerated Use Of Accessory Muscles For Inspiration] : no accessory muscle use [Auscultation Breath Sounds / Voice Sounds] : lungs were clear to auscultation bilaterally [Apical Impulse] : the apical impulse was normal [Heart Rate And Rhythm] : heart rate was normal and rhythm regular [Heart Sounds] : normal S1 and S2 [Heart Sounds Gallop] : no gallops [Arterial Pulses Carotid] : carotid pulses were normal with no bruits [Arterial Pulses Femoral] : femoral pulses were normal without bruits [Bowel Sounds] : normal bowel sounds [Abdomen Soft] : soft [Abdomen Tenderness] : non-tender [No CVA Tenderness] : no ~M costovertebral angle tenderness [No Spinal Tenderness] : no spinal tenderness [Abnormal Walk] : normal gait [Nail Clubbing] : no clubbing  or cyanosis of the fingernails [Motor Tone] : muscle strength and tone were normal [Skin Color & Pigmentation] : normal skin color and pigmentation [] : no rash [Skin Lesions] : no skin lesions [Cranial Nerves] : cranial nerves 2-12 were intact [Sensation] : the sensory exam was normal to light touch and pinprick [Motor Exam] : the motor exam was normal [Oriented To Time, Place, And Person] : oriented to person, place, and time [FreeTextEntry1] : R wrist swelling, no erythema, no decrease in ROM

## 2019-02-21 DIAGNOSIS — M25.539 PAIN IN UNSPECIFIED WRIST: ICD-10-CM

## 2019-02-21 DIAGNOSIS — M25.531 PAIN IN RIGHT WRIST: ICD-10-CM

## 2019-04-29 ENCOUNTER — LABORATORY RESULT (OUTPATIENT)
Age: 79
End: 2019-04-29

## 2019-04-30 ENCOUNTER — OUTPATIENT (OUTPATIENT)
Dept: OUTPATIENT SERVICES | Facility: HOSPITAL | Age: 79
LOS: 1 days | Discharge: HOME | End: 2019-04-30

## 2019-04-30 ENCOUNTER — APPOINTMENT (OUTPATIENT)
Dept: NEPHROLOGY | Facility: CLINIC | Age: 79
End: 2019-04-30

## 2019-04-30 VITALS
HEIGHT: 60 IN | HEART RATE: 82 BPM | BODY MASS INDEX: 39.27 KG/M2 | DIASTOLIC BLOOD PRESSURE: 79 MMHG | SYSTOLIC BLOOD PRESSURE: 147 MMHG | TEMPERATURE: 97 F | WEIGHT: 200 LBS

## 2019-04-30 NOTE — PHYSICAL EXAM
[General Appearance - Alert] : alert [General Appearance - In No Acute Distress] : in no acute distress [Edema] : there was no peripheral edema [Bowel Sounds] : normal bowel sounds [Abdomen Soft] : soft [Oriented To Time, Place, And Person] : oriented to person, place, and time [] : no rash [No Focal Deficits] : no focal deficits

## 2019-04-30 NOTE — ASSESSMENT
[FreeTextEntry1] : 78 y/o femalw is here for her follow up visit\par \par #CKD- stage 4\par creatine feb 2019 - 2.2 gfr 21\par -non compliant with meds in past but now reports compliance\par refills sent topharmacy\par protein :creatinine ratio 3.0 (feb 2019) \par patient moving to [place] requested to find nephrologist and pmd in [place] \par \par #htn- c/w current meds\par #LBP- chronic- requested to avoid nsaids and nephrotoxic medications\par f/upmd\par \par #patient moving to [place] will not be following with clinic here

## 2019-04-30 NOTE — HISTORY OF PRESENT ILLNESS
[FreeTextEntry1] : 78 y/o female with h/o htn, dld, asthma , ckd-4 ,dm is here for follow up visit .She was last seen in office 3 months ago for increased proteinuria as she had stopped taking lisinopril and not compliant with her lasix,.Today reg reports compliance with medications and diet . She is moving to [place] next month and requesting refill for Lisinopril and lasix.c/o LBP- chronic for which she is making appointment with her PMD soon

## 2019-05-02 ENCOUNTER — APPOINTMENT (OUTPATIENT)
Dept: ENDOCRINOLOGY | Facility: CLINIC | Age: 79
End: 2019-05-02

## 2019-05-02 ENCOUNTER — OUTPATIENT (OUTPATIENT)
Dept: OUTPATIENT SERVICES | Facility: HOSPITAL | Age: 79
LOS: 1 days | Discharge: HOME | End: 2019-05-02

## 2019-05-02 VITALS
HEIGHT: 60 IN | DIASTOLIC BLOOD PRESSURE: 72 MMHG | WEIGHT: 198 LBS | BODY MASS INDEX: 38.87 KG/M2 | SYSTOLIC BLOOD PRESSURE: 124 MMHG

## 2019-05-02 DIAGNOSIS — M25.531 PAIN IN RIGHT WRIST: ICD-10-CM

## 2019-05-02 RX ORDER — INSULIN GLARGINE 300 U/ML
300 INJECTION, SOLUTION SUBCUTANEOUS DAILY
Qty: 3 | Refills: 3 | Status: DISCONTINUED | COMMUNITY
Start: 2017-08-17 | End: 2019-05-02

## 2019-05-02 RX ORDER — CHLORHEXIDINE GLUCONATE 4 %
325 (65 FE) LIQUID (ML) TOPICAL DAILY
Qty: 90 | Refills: 1 | Status: DISCONTINUED | COMMUNITY
Start: 2018-05-02 | End: 2019-05-02

## 2019-05-02 NOTE — HISTORY OF PRESENT ILLNESS
[FreeTextEntry1] : 79 yr F with hx of D.M with neuropathy and CKD -4,HTN ,DLD is here for follow up after 02/2019\par her last Hga1 c was checked in 4/29/19 and it was 8.5 .recent Cr at baseline ,high Pr /Cr ratio in urine with albuminuria followed  by nephro.\par Taking Pioglitazone 30 ,with Victoza 1.8 and Toujeo 40 units .\par previously on gabapentin but bc of dizziness/cKD4, now off but still complaining of post herpatic neuralgia pain. Still has R finger 4th jt dec mobility and swelling. \par \par Home Glucose Monitoring: \par FS: AM still >200

## 2019-05-02 NOTE — REVIEW OF SYSTEMS
[Recent Weight Loss (___ Lbs)] : recent [unfilled] ~Ulb weight loss [Blurry Vision] : blurred vision [Joint Stiffness] : joint stiffness [Negative] : Gastrointestinal [Fatigue] : no fatigue [Fever] : no fever [Decreased Appetite] : appetite not decreased [Chills] : no chills [Headache] : no headaches [Dizziness] : no dizziness [Tremors] : no tremors [FreeTextEntry3] : intermittent blurring [FreeTextEntry9] : R 4th finger DIP; neuropathic pain over R hip

## 2019-05-02 NOTE — ASSESSMENT
[Diabetes Foot Care] : diabetes foot care [Action and use of Insulin] : action and use of short and long-acting insulin [Self Monitoring of Blood Glucose] : self monitoring of blood glucose [Hypoglycemia Management] : hypoglycemia management [Carbohydrate Consistent Diet] : carbohydrate consistent diet [Importance of Diet and Exercise] : importance of diet and exercise to improve glycemic control, achieve weight loss and improve cardiovascular health [Long Term Vascular Complications] : long term vascular complications of diabetes [FreeTextEntry1] : # D.M with neuropathy ;\par cw Pioglitazone and Victoza at same dose \par switched from Toujeo to lantus 40\par repeat HbgA1c 8.5 from 8.6\par Eye exam UTD\par foot exam UTD\par follow up in 6 mnths (or in [facility])\par  \par #R wrist pain / post herpatic neuralgia\par - counseled, to return to medicine clinic if persistent\par - restart gabapentin 300 TID PRN\par - pt to call [facility] romero about gabapentin\par - lidocaine patches restarted\par \par #abd hernia\par - follow up with medicine, pt counseled about follow up\par \par Counseling: The patient was counseled on carbohydrate consistent diet, diabetes foot care, long term vascular complications of diabetes, importance of diet and exercise to improve glycemic control, achieve weight loss and improve cardiovascular health, action and use of short and long-acting insulin. \par

## 2019-05-02 NOTE — REASON FOR VISIT
[Follow-Up: _____] : a [unfilled] follow-up visit [FreeTextEntry1] : obesity, type 2 diabetes, and renal failure.

## 2019-05-02 NOTE — PHYSICAL EXAM
[Alert] : alert [No Acute Distress] : no acute distress [Well Nourished] : well nourished [Well Developed] : well developed [Normal Sclera/Conjunctiva] : normal sclera/conjunctiva [PERRL] : pupils equal, round and reactive to light [EOMI] : extra ocular movement intact [Normal Lips/Gums] : the lips and gums were normal [Normal Oropharynx] : the oropharynx was normal [No Neck Mass] : no neck mass was observed [Supple] : the neck was supple [Normal Rate and Effort] : normal respiratory rhythm and effort [No Respiratory Distress] : no respiratory distress [No Accessory Muscle Use] : no accessory muscle use [Clear to Auscultation] : lungs were clear to auscultation bilaterally [Normal Rate] : heart rate was normal  [Normal S1, S2] : normal S1 and S2 [Normal Bowel Sounds] : normal bowel sounds [Regular Rhythm] : with a regular rhythm [Not Tender] : non-tender [Soft] : abdomen soft [No HSM] : no hepato-splenomegaly [No Rash] : no rash [Oriented x3] : oriented to person, place, and time [de-identified] : bilat foot edema to above ankle 2+ pitting [Foot Ulcers] : no foot ulcers [de-identified] : mild distension, lower pelvic hernia  [de-identified] : poor gait due to knee pain; R 5th finger DIP stiff/mildly swollen w/o redness/tenerness

## 2019-05-08 LAB
CHOLEST SERPL-MCNC: 206 MG/DL
CHOLEST/HDLC SERPL: 4.4 RATIO
HDLC SERPL-MCNC: 47 MG/DL
LDLC SERPL CALC-MCNC: 142 MG/DL
TRIGL SERPL-MCNC: 174 MG/DL

## 2019-05-10 ENCOUNTER — APPOINTMENT (OUTPATIENT)
Dept: PODIATRY | Facility: CLINIC | Age: 79
End: 2019-05-10

## 2019-05-11 RX ORDER — LIDOCAINE 5% 700 MG/1
5 PATCH TOPICAL
Qty: 30 | Refills: 0 | Status: ACTIVE | COMMUNITY
Start: 2019-02-20 | End: 1900-01-01

## 2019-05-16 DIAGNOSIS — E11.22 TYPE 2 DIABETES MELLITUS WITH DIABETIC CHRONIC KIDNEY DISEASE: ICD-10-CM

## 2019-05-16 DIAGNOSIS — E11.65 TYPE 2 DIABETES MELLITUS WITH HYPERGLYCEMIA: ICD-10-CM

## 2019-05-16 DIAGNOSIS — E11.42 TYPE 2 DIABETES MELLITUS WITH DIABETIC POLYNEUROPATHY: ICD-10-CM

## 2019-05-16 DIAGNOSIS — E66.01 MORBID (SEVERE) OBESITY DUE TO EXCESS CALORIES: ICD-10-CM

## 2019-06-24 ENCOUNTER — RX RENEWAL (OUTPATIENT)
Age: 79
End: 2019-06-24

## 2019-08-20 ENCOUNTER — RX RENEWAL (OUTPATIENT)
Age: 79
End: 2019-08-20

## 2019-08-29 ENCOUNTER — RX RENEWAL (OUTPATIENT)
Age: 79
End: 2019-08-29

## 2019-09-14 ENCOUNTER — RX RENEWAL (OUTPATIENT)
Age: 79
End: 2019-09-14

## 2019-11-18 ENCOUNTER — RX RENEWAL (OUTPATIENT)
Age: 79
End: 2019-11-18

## 2020-01-31 ENCOUNTER — APPOINTMENT (OUTPATIENT)
Dept: PODIATRY | Facility: CLINIC | Age: 80
End: 2020-01-31
Payer: MEDICARE

## 2020-01-31 ENCOUNTER — OUTPATIENT (OUTPATIENT)
Dept: OUTPATIENT SERVICES | Facility: HOSPITAL | Age: 80
LOS: 1 days | Discharge: HOME | End: 2020-01-31

## 2020-01-31 PROCEDURE — 99213 OFFICE O/P EST LOW 20 MIN: CPT | Mod: 25

## 2020-01-31 PROCEDURE — 11721 DEBRIDE NAIL 6 OR MORE: CPT

## 2020-02-03 NOTE — HISTORY OF PRESENT ILLNESS
[FreeTextEntry1] : 80 year old F  presents for a diabetic foot evaluation. Ms. TOLLIVER denies any tingling burning in her feet. Last A1c was 8.5 (4/19). Pt also here for continued management of mycotic nails x 10. \par

## 2020-02-03 NOTE — ASSESSMENT
[FreeTextEntry1] : -Aseptic debridement of all fungal nails.  Patient has pain about the toes secondary to nail pressure and relates improvement with periodic debridement.\par -Loss of protective sensation: yes  \par -presence of pre-ulcerative lesion: no\par   -hemorrhage into callus:  no\par -atrophy of heel or metatarsal fat pads:  no\par \par -Diabetic neurovascular foot assessment  performed. \par -Discussed with patient diabetic foot hygiene.Patient instructed to regularly check the bottom of the feet\par -Patient given a diabetic foot education sheet\par -Return 6 month\par

## 2020-02-03 NOTE — PHYSICAL EXAM
[General Appearance - Alert] : alert [General Appearance - In No Acute Distress] : in no acute distress [Full Pulse] : the pedal pulses are present [Vibration Dec.] : diminished vibratory sensation at the level of the toes [Oriented To Time, Place, And Person] : oriented to person, place, and time [Impaired Insight] : insight and judgment were intact [Diminished Throughout Right Foot] : normal tactile sensation with monofilament testing throughout right foot [Diminished Throughout Left Foot] : normal tactile sensation with monofilament testing throughout left foot [FreeTextEntry1] : dystrophic, thick nails x 10. 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 no open lesions appreciated

## 2020-02-13 ENCOUNTER — APPOINTMENT (OUTPATIENT)
Dept: ENDOCRINOLOGY | Facility: CLINIC | Age: 80
End: 2020-02-13

## 2020-02-20 ENCOUNTER — OUTPATIENT (OUTPATIENT)
Dept: OUTPATIENT SERVICES | Facility: HOSPITAL | Age: 80
LOS: 1 days | Discharge: HOME | End: 2020-02-20

## 2020-02-20 ENCOUNTER — APPOINTMENT (OUTPATIENT)
Dept: ENDOCRINOLOGY | Facility: CLINIC | Age: 80
End: 2020-02-20

## 2020-02-20 VITALS
SYSTOLIC BLOOD PRESSURE: 188 MMHG | DIASTOLIC BLOOD PRESSURE: 77 MMHG | HEART RATE: 87 BPM | WEIGHT: 201 LBS | HEIGHT: 60 IN | BODY MASS INDEX: 39.46 KG/M2

## 2020-02-20 VITALS — SYSTOLIC BLOOD PRESSURE: 134 MMHG | DIASTOLIC BLOOD PRESSURE: 74 MMHG

## 2020-02-20 DIAGNOSIS — M75.00 ADHESIVE CAPSULITIS OF UNSPECIFIED SHOULDER: ICD-10-CM

## 2020-02-20 DIAGNOSIS — G62.9 POLYNEUROPATHY, UNSPECIFIED: ICD-10-CM

## 2020-02-20 RX ORDER — EZETIMIBE 10 MG/1
10 TABLET ORAL
Qty: 90 | Refills: 3 | Status: ACTIVE | COMMUNITY
Start: 2018-08-09 | End: 1900-01-01

## 2020-02-20 NOTE — HISTORY OF PRESENT ILLNESS
[FreeTextEntry1] : 79 yr F with hx of D.M with neuropathy and CKD -4,HTN ,DLD is here for follow up after 02/2019\par her last Hga1 c was checked in 4/29/19 and it was 8.5 .recent Cr at baseline ,high Pr /Cr ratio in urine with albuminuria followed  by nephro.\par Taking Pioglitazone 30 ,with Victoza 1.8 a\par \par

## 2020-02-20 NOTE — ASSESSMENT
[Carbohydrate Consistent Diet] : carbohydrate consistent diet [Hypoglycemia Management] : hypoglycemia management [Diabetes Foot Care] : diabetes foot care [Long Term Vascular Complications] : long term vascular complications of diabetes [Importance of Diet and Exercise] : importance of diet and exercise to improve glycemic control, achieve weight loss and improve cardiovascular health [Action and use of Insulin] : action and use of short and long-acting insulin [Self Monitoring of Blood Glucose] : self monitoring of blood glucose [FreeTextEntry1] : # D.M with neuropathy ;\par cw Pioglitazone and Victoza at same dose \par switched from Toujeo to lantus 40\par repeat HbgA1c 8.5 from 8.6\par Eye exam UTD\par foot exam UTD\par follow up in 6 mnths (or in [facility])\par  \par #R wrist pain / post herpatic neuralgia\par - counseled, to return to medicine clinic if persistent\par - restart gabapentin 300 TID PRN\par - pt to call [facility]ero about gabapentin\par - lidocaine patches restarted\par \par \par Counseling: The patient was counseled on carbohydrate consistent diet, diabetes foot care, long term vascular complications of diabetes, importance of diet and exercise to improve glycemic control, achieve weight loss and improve cardiovascular health, action and use of short and long-acting insulin. \par

## 2020-02-20 NOTE — PHYSICAL EXAM
[Alert] : alert [No Acute Distress] : no acute distress [Well Nourished] : well nourished [Well Developed] : well developed [Normal Sclera/Conjunctiva] : normal sclera/conjunctiva [PERRL] : pupils equal, round and reactive to light [EOMI] : extra ocular movement intact [Normal Lips/Gums] : the lips and gums were normal [Normal Oropharynx] : the oropharynx was normal [No Neck Mass] : no neck mass was observed [Supple] : the neck was supple [No Respiratory Distress] : no respiratory distress [Normal Rate and Effort] : normal respiratory rhythm and effort [No Accessory Muscle Use] : no accessory muscle use [Clear to Auscultation] : lungs were clear to auscultation bilaterally [Normal Rate] : heart rate was normal  [Normal S1, S2] : normal S1 and S2 [Regular Rhythm] : with a regular rhythm [Normal Bowel Sounds] : normal bowel sounds [Not Tender] : non-tender [Soft] : abdomen soft [No HSM] : no hepato-splenomegaly [No Rash] : no rash [Oriented x3] : oriented to person, place, and time [Foot Ulcers] : no foot ulcers [de-identified] : bilat foot edema to above ankle 2+ pitting [de-identified] : mild distension, lower pelvic hernia  [de-identified] : poor gait due to knee pain; R 5th finger DIP stiff/mildly swollen w/o redness/tenerness

## 2020-02-20 NOTE — REVIEW OF SYSTEMS
[Recent Weight Loss (___ Lbs)] : recent [unfilled] ~Ulb weight loss [Blurry Vision] : blurred vision [Joint Stiffness] : joint stiffness [Negative] : Gastrointestinal [Fatigue] : no fatigue [Decreased Appetite] : appetite not decreased [Fever] : no fever [Chills] : no chills [Tremors] : no tremors [Headache] : no headaches [Dizziness] : no dizziness [FreeTextEntry9] : R 4th finger DIP; neuropathic pain over R hip [FreeTextEntry3] : intermittent blurring

## 2020-02-22 RX ORDER — ERGOCALCIFEROL 1.25 MG/1
1.25 MG CAPSULE ORAL
Qty: 13 | Refills: 3 | Status: ACTIVE | COMMUNITY
Start: 2020-02-22 | End: 1900-01-01

## 2020-02-23 LAB
25(OH)D3 SERPL-MCNC: 12 NG/ML
ALBUMIN SERPL ELPH-MCNC: 3.7 G/DL
ALP BLD-CCNC: 165 U/L
ALT SERPL-CCNC: <5 U/L
ANION GAP SERPL CALC-SCNC: 15 MMOL/L
AST SERPL-CCNC: 11 U/L
BASOPHILS # BLD AUTO: 0.02 K/UL
BASOPHILS NFR BLD AUTO: 0.3 %
BILIRUB SERPL-MCNC: <0.2 MG/DL
BUN SERPL-MCNC: 67 MG/DL
CALCIUM SERPL-MCNC: 8.6 MG/DL
CHLORIDE SERPL-SCNC: 104 MMOL/L
CHOLEST SERPL-MCNC: 206 MG/DL
CHOLEST/HDLC SERPL: 5.3 RATIO
CO2 SERPL-SCNC: 16 MMOL/L
CREAT SERPL-MCNC: 2.9 MG/DL
CREAT SPEC-SCNC: 45 MG/DL
EOSINOPHIL # BLD AUTO: 0.16 K/UL
EOSINOPHIL NFR BLD AUTO: 2.7 %
ESTIMATED AVERAGE GLUCOSE: 223 MG/DL
GLUCOSE SERPL-MCNC: 326 MG/DL
HBA1C MFR BLD HPLC: 9.4 %
HCT VFR BLD CALC: 31.9 %
HDLC SERPL-MCNC: 39 MG/DL
HGB BLD-MCNC: 9.9 G/DL
IMM GRANULOCYTES NFR BLD AUTO: 0.3 %
LDLC SERPL CALC-MCNC: 130 MG/DL
LYMPHOCYTES # BLD AUTO: 1.24 K/UL
LYMPHOCYTES NFR BLD AUTO: 21.3 %
MAN DIFF?: NORMAL
MCHC RBC-ENTMCNC: 27.2 PG
MCHC RBC-ENTMCNC: 31 G/DL
MCV RBC AUTO: 87.6 FL
MICROALBUMIN 24H UR DL<=1MG/L-MCNC: 64.5 MG/DL
MICROALBUMIN/CREAT 24H UR-RTO: 1432 MG/G
MONOCYTES # BLD AUTO: 0.54 K/UL
MONOCYTES NFR BLD AUTO: 9.3 %
NEUTROPHILS # BLD AUTO: 3.84 K/UL
NEUTROPHILS NFR BLD AUTO: 66.1 %
PLATELET # BLD AUTO: 237 K/UL
POTASSIUM SERPL-SCNC: 5.3 MMOL/L
PROT SERPL-MCNC: 6.9 G/DL
RBC # BLD: 3.64 M/UL
RBC # FLD: 15.5 %
SODIUM SERPL-SCNC: 135 MMOL/L
TRIGL SERPL-MCNC: 224 MG/DL
TSH SERPL-ACNC: 1.32 UIU/ML
WBC # FLD AUTO: 5.82 K/UL

## 2020-02-25 ENCOUNTER — OUTPATIENT (OUTPATIENT)
Dept: OUTPATIENT SERVICES | Facility: HOSPITAL | Age: 80
LOS: 1 days | Discharge: HOME | End: 2020-02-25
Payer: MEDICARE

## 2020-02-25 ENCOUNTER — OUTPATIENT (OUTPATIENT)
Dept: OUTPATIENT SERVICES | Facility: HOSPITAL | Age: 80
LOS: 1 days | Discharge: HOME | End: 2020-02-25

## 2020-02-25 ENCOUNTER — APPOINTMENT (OUTPATIENT)
Dept: NEPHROLOGY | Facility: CLINIC | Age: 80
End: 2020-02-25
Payer: MEDICARE

## 2020-02-25 VITALS
SYSTOLIC BLOOD PRESSURE: 139 MMHG | HEIGHT: 60 IN | BODY MASS INDEX: 39.27 KG/M2 | WEIGHT: 200 LBS | DIASTOLIC BLOOD PRESSURE: 73 MMHG | HEART RATE: 73 BPM

## 2020-02-25 DIAGNOSIS — G62.9 POLYNEUROPATHY, UNSPECIFIED: ICD-10-CM

## 2020-02-25 PROCEDURE — 72100 X-RAY EXAM L-S SPINE 2/3 VWS: CPT | Mod: 26

## 2020-02-25 PROCEDURE — 99213 OFFICE O/P EST LOW 20 MIN: CPT | Mod: GC

## 2020-02-25 PROCEDURE — 99203 OFFICE O/P NEW LOW 30 MIN: CPT | Mod: GC

## 2020-02-25 NOTE — PHYSICAL EXAM
[Well Developed] : well developed [Well Nourished] : well nourished [Well-Appearing] : well-appearing [No Respiratory Distress] : no respiratory distress  [No Accessory Muscle Use] : no accessory muscle use [Clear to Auscultation] : lungs were clear to auscultation bilaterally [Regular Rhythm] : with a regular rhythm [Normal Rate] : normal rate  [Normal S1, S2] : normal S1 and S2 [Soft] : abdomen soft [Non Tender] : non-tender [Non-distended] : non-distended [de-identified] : pain fro back

## 2020-02-25 NOTE — REVIEW OF SYSTEMS
[Fatigue] : fatigue [Fever] : no fever [Chills] : no chills [Chest Pain] : no chest pain [Palpitations] : no palpitations [Shortness Of Breath] : no shortness of breath [Wheezing] : no wheezing [Cough] : no cough [Abdominal Pain] : no abdominal pain [Nausea] : no nausea [Constipation] : no constipation

## 2020-02-25 NOTE — ASSESSMENT
[FreeTextEntry1] : 81 y/o femalw is here for her follow up visit for worsening kidney function.\par \par #CKD- stage 4\par creatine feb 2019 - 2.2 feb 20 is 2.9 K is 5.3, microalbumin to creatinine ratio 1432\par -pt reports she is compliant with amlodipine, lasix, lisinpril - cant go up on ace/arb due to potassium will refer to nutrition for low potassium diet and add clorthalidone\par will need close follow up woth worsening kidney function ordering iron studies and phosphate levels as pt will need to be watched for sequela of ckd\par \par #back pain- ordering xray of back \par -f/u for better control with medicine team no NSAIDs no nephrotoxic agents\par \par blood work to be done before next visit in 3 months

## 2020-02-25 NOTE — HISTORY OF PRESENT ILLNESS
[de-identified] : 79 y/o female with h/o htn, dld, asthma , ckd-4 ,dm is here for follow up visit pt was called by primary doctor to come to nephro clinic due to worsening kidney function on labs. Creatinine 2.9 up from 2.2. K 5.3. .She was last seen in office last year for increased proteinuria as she had stopped taking lisinopril and not compliant with her lasix,.Today patietn reports compliance with medications and diet . \par Pt chronic lower back pain which is bothering her today with whole body aches relieved with walking.\par

## 2020-02-26 DIAGNOSIS — E11.21 TYPE 2 DIABETES MELLITUS WITH DIABETIC NEPHROPATHY: ICD-10-CM

## 2020-02-26 DIAGNOSIS — E66.01 MORBID (SEVERE) OBESITY DUE TO EXCESS CALORIES: ICD-10-CM

## 2020-02-26 DIAGNOSIS — E11.65 TYPE 2 DIABETES MELLITUS WITH HYPERGLYCEMIA: ICD-10-CM

## 2020-02-27 DIAGNOSIS — G62.9 POLYNEUROPATHY, UNSPECIFIED: ICD-10-CM

## 2020-02-27 DIAGNOSIS — R79.9 ABNORMAL FINDING OF BLOOD CHEMISTRY, UNSPECIFIED: ICD-10-CM

## 2020-02-27 DIAGNOSIS — Z02.9 ENCOUNTER FOR ADMINISTRATIVE EXAMINATIONS, UNSPECIFIED: ICD-10-CM

## 2020-03-03 ENCOUNTER — OUTPATIENT (OUTPATIENT)
Dept: OUTPATIENT SERVICES | Facility: HOSPITAL | Age: 80
LOS: 1 days | Discharge: HOME | End: 2020-03-03

## 2020-03-03 ENCOUNTER — APPOINTMENT (OUTPATIENT)
Dept: NUTRITION | Facility: CLINIC | Age: 80
End: 2020-03-03

## 2020-03-03 ENCOUNTER — APPOINTMENT (OUTPATIENT)
Dept: NEPHROLOGY | Facility: CLINIC | Age: 80
End: 2020-03-03

## 2020-03-12 ENCOUNTER — LABORATORY RESULT (OUTPATIENT)
Age: 80
End: 2020-03-12

## 2020-03-17 ENCOUNTER — APPOINTMENT (OUTPATIENT)
Dept: NUTRITION | Facility: CLINIC | Age: 80
End: 2020-03-17

## 2020-03-17 LAB
ALBUMIN SERPL ELPH-MCNC: 3.9 G/DL
ALP BLD-CCNC: 160 U/L
ALT SERPL-CCNC: 10 U/L
ANION GAP SERPL CALC-SCNC: 18 MMOL/L
APPEARANCE: CLEAR
AST SERPL-CCNC: 13 U/L
BASOPHILS # BLD AUTO: 0.03 K/UL
BASOPHILS NFR BLD AUTO: 0.5 %
BILIRUB SERPL-MCNC: <0.2 MG/DL
BILIRUBIN URINE: NEGATIVE
BLOOD URINE: NEGATIVE
BUN SERPL-MCNC: 89 MG/DL
CALCIUM SERPL-MCNC: 8.9 MG/DL
CHLORIDE SERPL-SCNC: 98 MMOL/L
CO2 SERPL-SCNC: 19 MMOL/L
COLOR: NORMAL
CREAT SERPL-MCNC: 3.6 MG/DL
EOSINOPHIL # BLD AUTO: 0.25 K/UL
EOSINOPHIL NFR BLD AUTO: 4.4 %
FERRITIN SERPL-MCNC: 171 NG/ML
GLUCOSE QUALITATIVE U: ABNORMAL
GLUCOSE SERPL-MCNC: 302 MG/DL
HCT VFR BLD CALC: 31.8 %
HGB BLD-MCNC: 10.1 G/DL
IMM GRANULOCYTES NFR BLD AUTO: 0.4 %
IRON SATN MFR SERPL: 32 %
IRON SERPL-MCNC: 89 UG/DL
KETONES URINE: NEGATIVE
LEUKOCYTE ESTERASE URINE: NEGATIVE
LYMPHOCYTES # BLD AUTO: 1.2 K/UL
LYMPHOCYTES NFR BLD AUTO: 21.2 %
MAN DIFF?: NORMAL
MCHC RBC-ENTMCNC: 27.6 PG
MCHC RBC-ENTMCNC: 31.8 G/DL
MCV RBC AUTO: 86.9 FL
MONOCYTES # BLD AUTO: 0.67 K/UL
MONOCYTES NFR BLD AUTO: 11.9 %
NEUTROPHILS # BLD AUTO: 3.48 K/UL
NEUTROPHILS NFR BLD AUTO: 61.6 %
NITRITE URINE: NEGATIVE
PH URINE: 6.5
PHOSPHATE SERPL-MCNC: 4.7 MG/DL
PLATELET # BLD AUTO: 240 K/UL
POTASSIUM SERPL-SCNC: 5.1 MMOL/L
PROT SERPL-MCNC: 7 G/DL
PROTEIN URINE: ABNORMAL
RBC # BLD: 3.66 M/UL
RBC # FLD: 15.1 %
SODIUM SERPL-SCNC: 135 MMOL/L
SPECIFIC GRAVITY URINE: 1.01
TIBC SERPL-MCNC: 274 UG/DL
TRANSFERRIN SERPL-MCNC: 221 MG/DL
UIBC SERPL-MCNC: 185 UG/DL
UROBILINOGEN URINE: NORMAL
WBC # FLD AUTO: 5.65 K/UL

## 2020-03-26 ENCOUNTER — APPOINTMENT (OUTPATIENT)
Dept: GERIATRICS | Facility: CLINIC | Age: 80
End: 2020-03-26
Payer: MEDICARE

## 2020-03-26 ENCOUNTER — OUTPATIENT (OUTPATIENT)
Dept: OUTPATIENT SERVICES | Facility: HOSPITAL | Age: 80
LOS: 1 days | Discharge: HOME | End: 2020-03-26

## 2020-03-26 VITALS
SYSTOLIC BLOOD PRESSURE: 104 MMHG | WEIGHT: 202 LBS | HEART RATE: 78 BPM | BODY MASS INDEX: 39.66 KG/M2 | HEIGHT: 60 IN | DIASTOLIC BLOOD PRESSURE: 62 MMHG

## 2020-03-26 DIAGNOSIS — K46.9 UNSPECIFIED ABDOMINAL HERNIA W/OUT OBSTRUCTION OR GANGRENE: ICD-10-CM

## 2020-03-26 DIAGNOSIS — E55.9 VITAMIN D DEFICIENCY, UNSPECIFIED: ICD-10-CM

## 2020-03-26 DIAGNOSIS — Z00.00 ENCOUNTER FOR GENERAL ADULT MEDICAL EXAMINATION W/OUT ABNORMAL FINDINGS: ICD-10-CM

## 2020-03-26 PROCEDURE — 99214 OFFICE O/P EST MOD 30 MIN: CPT

## 2020-03-26 RX ORDER — ALBUTEROL SULFATE 90 UG/1
108 (90 BASE) AEROSOL, METERED RESPIRATORY (INHALATION) 3 TIMES DAILY
Qty: 1 | Refills: 1 | Status: ACTIVE | COMMUNITY
Start: 2020-03-26 | End: 1900-01-01

## 2020-03-26 NOTE — END OF VISIT
[] : Resident [FreeTextEntry3] : SEEN WITH GISELLE TEAM\par concur with above\par 1. DM - poorly controlled; continue insulin, Ozempic and will increase pioglitazone to 45mg daily - pt counseled re goal A1c under 8\par 2. CKD st IV; started on chlorthaladone by renal - hemodynamically stble - noteed low BP but asymptoatic\par 3. chronic post herpetic neuralgia; is to limit gabapentin to 300mg once daily]\par 4. has intermittently painful right inguinal hernia; she should have it repaired - with her history would do better if electively repaired; will refer to Dr. Barragan - will need to get her DM under better control

## 2020-03-26 NOTE — HISTORY OF PRESENT ILLNESS
[FreeTextEntry1] : 79 y/o F w/ PMH of Controlled HTN on meds, DLD on statins, Asthma not in exacerbation, postherpetic neuralgia on gabapentin, CKD4, DM II on insulin presenting for follow up. Found to have worsening A1c on labs.\par \par Also c/o increasing abdominal pain from her right inguinal hernia. No symptoms of obstruction and no signs of strangulation.

## 2020-03-26 NOTE — ASSESSMENT
[Daily physical exercise as tolerated] : Daily physical exercise as tolerated [Pain Management] : pain management [Medication Management] : medication management [FreeTextEntry1] : Uncontrolled DM II (HbA1C is 8.7 last yr)\par - latest HbA1c 9.4%, up from 8.5 last year\par - Patient is following with her Endocrinologist \par - Uptodate with podiatry\par - Will continue with Lantus 40 units, and Ozempic prescribed as per Endo\par - Increasing Pioglitazone to 45mg q24hrs\par - f/u repeat HbA1c\par \par Right sided Inguinal Hernia\par - Patient c/o increased pain, intermittent, 7/10 in intensity when at worse and sometimes wakes her up at night\par - No symptoms of obstruction\par - no evidence of strangulation on exam\par - given referral to Dr Barragan's hernia clinic\par \par Post herpetic neuralgia \par - patient's gabapentin was stopped during last visit because of dizziness and CKD stage 4\par - Gabapentin resumed PRN due to neuropathy by Endo\par - Resuming Gabapentin at 300mg q24 as per recommendations on patient's GFR\par \par R wrist swelling and pain: Likely post traumatic, No fractures noted in past according to patient\par - Resolved\par \par HTN controlled as of now\par - c/w Amlodipine, Lisinopril and lasix\par \par Hypercholesterolemia \par - c/w lipitor 40 mg\par - ASA 81 stopped at last visit (No CAD)\par - Lipid profile\par \par CKD stage 4\par - Stable, follow up with nephro (Dr. Kleiner)\par - c/w Chlorthalidone as per nephro\par \par Asthma \par - Patient does not complain of any shortness of breath\par - symbicort, ventolin prn\par \par \par HCM\par Uptodate with Flu vaccine/Shingrix\par Colonoscopy last yr was normal according to patient\par Mammogram uptodate\par \par \par .

## 2020-03-26 NOTE — PHYSICAL EXAM
[General Appearance - Alert] : alert [Sclera] : the sclera and conjunctiva were normal [Extraocular Movements] : extraocular movements were intact [Normal Oral Mucosa] : normal oral mucosa [Outer Ear] : the ears and nose were normal in appearance [Neck Appearance] : the appearance of the neck was normal [Respiration, Rhythm And Depth] : normal respiratory rhythm and effort [Exaggerated Use Of Accessory Muscles For Inspiration] : no accessory muscle use [Auscultation Breath Sounds / Voice Sounds] : lungs were clear to auscultation bilaterally [Apical Impulse] : the apical impulse was normal [Heart Rate And Rhythm] : heart rate was normal and rhythm regular [Heart Sounds] : normal S1 and S2 [Heart Sounds Gallop] : no gallops [Arterial Pulses Carotid] : carotid pulses were normal with no bruits [Arterial Pulses Femoral] : femoral pulses were normal without bruits [Bowel Sounds] : normal bowel sounds [Abdomen Soft] : soft [Abdomen Tenderness] : non-tender [No CVA Tenderness] : no ~M costovertebral angle tenderness [No Spinal Tenderness] : no spinal tenderness [Abnormal Walk] : normal gait [Nail Clubbing] : no clubbing  or cyanosis of the fingernails [Motor Tone] : muscle strength and tone were normal [Skin Color & Pigmentation] : normal skin color and pigmentation [] : no rash [Skin Lesions] : no skin lesions [Cranial Nerves] : cranial nerves 2-12 were intact [Sensation] : the sensory exam was normal to light touch and pinprick [Motor Exam] : the motor exam was normal [Oriented To Time, Place, And Person] : oriented to person, place, and time [Soft] : abdomen soft [Non Tender] : non-tender [Normal] : normal gait, coordination grossly intact, no focal deficits and deep tendon reflexes were 2+ and symmetric [de-identified] : Right sided inguinal hernia [FreeTextEntry1] : R wrist swelling, no erythema, no decrease in ROM

## 2020-04-08 DIAGNOSIS — J45.909 UNSPECIFIED ASTHMA, UNCOMPLICATED: ICD-10-CM

## 2020-04-08 DIAGNOSIS — E55.9 VITAMIN D DEFICIENCY, UNSPECIFIED: ICD-10-CM

## 2020-04-08 DIAGNOSIS — E11.42 TYPE 2 DIABETES MELLITUS WITH DIABETIC POLYNEUROPATHY: ICD-10-CM

## 2020-04-08 DIAGNOSIS — K46.9 UNSPECIFIED ABDOMINAL HERNIA WITHOUT OBSTRUCTION OR GANGRENE: ICD-10-CM

## 2020-04-08 DIAGNOSIS — N18.9 CHRONIC KIDNEY DISEASE, UNSPECIFIED: ICD-10-CM

## 2020-04-19 ENCOUNTER — RX RENEWAL (OUTPATIENT)
Age: 80
End: 2020-04-19

## 2020-04-22 RX ORDER — LIRAGLUTIDE 6 MG/ML
18 INJECTION SUBCUTANEOUS DAILY
Qty: 3 | Refills: 3 | Status: DISCONTINUED | COMMUNITY
Start: 2017-08-17 | End: 2020-04-22

## 2020-04-28 ENCOUNTER — APPOINTMENT (OUTPATIENT)
Dept: NEPHROLOGY | Facility: CLINIC | Age: 80
End: 2020-04-28

## 2020-05-05 ENCOUNTER — OUTPATIENT (OUTPATIENT)
Dept: OUTPATIENT SERVICES | Facility: HOSPITAL | Age: 80
LOS: 1 days | Discharge: HOME | End: 2020-05-05
Payer: MEDICARE

## 2020-05-05 PROCEDURE — 92004 COMPRE OPH EXAM NEW PT 1/>: CPT

## 2020-05-05 PROCEDURE — 92134 CPTRZ OPH DX IMG PST SGM RTA: CPT | Mod: 26

## 2020-05-05 PROCEDURE — 67028 INJECTION EYE DRUG: CPT | Mod: LT

## 2020-05-08 DIAGNOSIS — H40.1130 PRIMARY OPEN-ANGLE GLAUCOMA, BILATERAL, STAGE UNSPECIFIED: ICD-10-CM

## 2020-05-08 DIAGNOSIS — H25.13 AGE-RELATED NUCLEAR CATARACT, BILATERAL: ICD-10-CM

## 2020-05-08 DIAGNOSIS — H35.039 HYPERTENSIVE RETINOPATHY, UNSPECIFIED EYE: ICD-10-CM

## 2020-05-08 DIAGNOSIS — H34.8320 TRIBUTARY (BRANCH) RETINAL VEIN OCCLUSION, LEFT EYE, WITH MACULAR EDEMA: ICD-10-CM

## 2020-05-15 ENCOUNTER — APPOINTMENT (OUTPATIENT)
Dept: PODIATRY | Facility: CLINIC | Age: 80
End: 2020-05-15

## 2020-05-22 ENCOUNTER — OUTPATIENT (OUTPATIENT)
Dept: OUTPATIENT SERVICES | Facility: HOSPITAL | Age: 80
LOS: 1 days | Discharge: HOME | End: 2020-05-22

## 2020-05-22 ENCOUNTER — APPOINTMENT (OUTPATIENT)
Dept: GASTROENTEROLOGY | Facility: CLINIC | Age: 80
End: 2020-05-22

## 2020-05-26 ENCOUNTER — APPOINTMENT (OUTPATIENT)
Dept: NEPHROLOGY | Facility: CLINIC | Age: 80
End: 2020-05-26

## 2020-06-03 ENCOUNTER — APPOINTMENT (OUTPATIENT)
Dept: PODIATRY | Facility: CLINIC | Age: 80
End: 2020-06-03
Payer: MEDICARE

## 2020-06-03 ENCOUNTER — OUTPATIENT (OUTPATIENT)
Dept: OUTPATIENT SERVICES | Facility: HOSPITAL | Age: 80
LOS: 1 days | Discharge: HOME | End: 2020-06-03

## 2020-06-03 PROCEDURE — 11721 DEBRIDE NAIL 6 OR MORE: CPT

## 2020-06-03 NOTE — ASSESSMENT
[FreeTextEntry1] : -Aseptic debridement of all fungal nails.  Patient has pain about the toes secondary to nail pressure and relates improvement with periodic debridement.\par \par -Return 3 month\par

## 2020-06-03 NOTE — PHYSICAL EXAM
[General Appearance - In No Acute Distress] : in no acute distress [General Appearance - Alert] : alert [Full Pulse] : the pedal pulses are present [Vibration Dec.] : diminished vibratory sensation at the level of the toes [Oriented To Time, Place, And Person] : oriented to person, place, and time [Impaired Insight] : insight and judgment were intact [Diminished Throughout Left Foot] : normal tactile sensation with monofilament testing throughout left foot [Diminished Throughout Right Foot] : normal tactile sensation with monofilament testing throughout right foot [FreeTextEntry1] : dystrophic, thick nails x 10. 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 no open lesions appreciated

## 2020-06-11 ENCOUNTER — APPOINTMENT (OUTPATIENT)
Dept: GERIATRICS | Facility: CLINIC | Age: 80
End: 2020-06-11
Payer: MEDICARE

## 2020-06-11 ENCOUNTER — OUTPATIENT (OUTPATIENT)
Dept: OUTPATIENT SERVICES | Facility: HOSPITAL | Age: 80
LOS: 1 days | Discharge: HOME | End: 2020-06-11

## 2020-06-11 VITALS
WEIGHT: 203 LBS | TEMPERATURE: 98 F | BODY MASS INDEX: 39.85 KG/M2 | SYSTOLIC BLOOD PRESSURE: 137 MMHG | HEIGHT: 60 IN | HEART RATE: 81 BPM | DIASTOLIC BLOOD PRESSURE: 85 MMHG | OXYGEN SATURATION: 98 %

## 2020-06-11 DIAGNOSIS — N39.0 URINARY TRACT INFECTION, SITE NOT SPECIFIED: ICD-10-CM

## 2020-06-11 DIAGNOSIS — K40.90 UNILATERAL INGUINAL HERNIA, W/OUT OBSTRUCTION OR GANGRENE, NOT SPECIFIED AS RECURRENT: ICD-10-CM

## 2020-06-11 DIAGNOSIS — G56.01 CARPAL TUNNEL SYNDROME, RIGHT UPPER LIMB: ICD-10-CM

## 2020-06-11 PROCEDURE — 99214 OFFICE O/P EST MOD 30 MIN: CPT

## 2020-06-11 RX ORDER — PIOGLITAZONE HYDROCHLORIDE 45 MG/1
45 TABLET ORAL DAILY
Qty: 30 | Refills: 5 | Status: DISCONTINUED | COMMUNITY
Start: 2020-03-26 | End: 2020-06-11

## 2020-06-11 NOTE — REVIEW OF SYSTEMS
[Negative] : Respiratory [FreeTextEntry8] : See UTI as above [FreeTextEntry9] : see possible right carpal tunnel as above

## 2020-06-11 NOTE — ASSESSMENT
[FreeTextEntry1] : Multiple issues\par 1. UTI - will treat with sulfamethox/trimeth AB; advised to increase fluids and cranberry juice\par 2. Right Carpal Tunnel - noted right palm with significant muscle wasting; will refer to ortho\par 3. right inguinal hernia - refer to surgery with Dr. SAUL Barragan for evaluation\par 4. she self stopped pioglitazone - continue other medications and repeat labs in 4-6 weeks.\par Continue other maintenance medications.

## 2020-06-11 NOTE — HISTORY OF PRESENT ILLNESS
[FreeTextEntry1] : For an inexplicable reason she stopped her pioglitazone for glucoses in 115 range which would make her feel "funny"; she does continue ozempic weekly (took it yesterday); wants to stay off the oral medication.\par She also c/o increased urination at night with some dysuria and urgency.\par She c/o chronic right inguinal hernia; was to see surgeon but sessions were closed and desires f/u there.\par Also c/o right hand numbness; she has hx of remote surgery on left hand for carpal tunnel.

## 2020-06-11 NOTE — END OF VISIT
[FreeTextEntry3] : Seen by attending only for DM, HT< right carpal tunnel, UTI and right inguinal hernia

## 2020-06-11 NOTE — PHYSICAL EXAM
[Extraocular Movements] : extraocular movements were intact [General Appearance - Alert] : alert No [General Appearance - In No Acute Distress] : in no acute distress [] : no respiratory distress [Neck Cervical Mass (___cm)] : no neck mass was observed [Heart Rate And Rhythm] : heart rate was normal and rhythm regular [Auscultation Breath Sounds / Voice Sounds] : lungs were clear to auscultation bilaterally [FreeTextEntry1] : soft; obese; no CVA tenderness

## 2020-06-18 DIAGNOSIS — I10 ESSENTIAL (PRIMARY) HYPERTENSION: ICD-10-CM

## 2020-06-18 DIAGNOSIS — E11.22 TYPE 2 DIABETES MELLITUS WITH DIABETIC CHRONIC KIDNEY DISEASE: ICD-10-CM

## 2020-06-18 DIAGNOSIS — G56.01 CARPAL TUNNEL SYNDROME, RIGHT UPPER LIMB: ICD-10-CM

## 2020-06-18 DIAGNOSIS — E55.9 VITAMIN D DEFICIENCY, UNSPECIFIED: ICD-10-CM

## 2020-06-18 DIAGNOSIS — N39.0 URINARY TRACT INFECTION, SITE NOT SPECIFIED: ICD-10-CM

## 2020-06-18 DIAGNOSIS — N18.9 CHRONIC KIDNEY DISEASE, UNSPECIFIED: ICD-10-CM

## 2020-06-18 DIAGNOSIS — K40.90 UNILATERAL INGUINAL HERNIA, WITHOUT OBSTRUCTION OR GANGRENE, NOT SPECIFIED AS RECURRENT: ICD-10-CM

## 2020-07-16 ENCOUNTER — APPOINTMENT (OUTPATIENT)
Dept: ENDOCRINOLOGY | Facility: CLINIC | Age: 80
End: 2020-07-16

## 2020-09-10 ENCOUNTER — APPOINTMENT (OUTPATIENT)
Dept: SURGERY | Facility: CLINIC | Age: 80
End: 2020-09-10
Payer: MEDICARE

## 2020-09-10 VITALS
DIASTOLIC BLOOD PRESSURE: 80 MMHG | SYSTOLIC BLOOD PRESSURE: 120 MMHG | TEMPERATURE: 97.3 F | WEIGHT: 210 LBS | HEIGHT: 60 IN | BODY MASS INDEX: 41.23 KG/M2 | HEART RATE: 77 BPM

## 2020-09-10 PROCEDURE — 99213 OFFICE O/P EST LOW 20 MIN: CPT

## 2020-09-10 NOTE — HISTORY OF PRESENT ILLNESS
[de-identified] : 80F with DM on insulin, ckd, htn, asthma admitted several times over last few years with asthma attacks presents with complaints of superpubic swelling and pain.  \par she reports that for 5 months she has noticed swelling in the pendulous fat pad overlying her pubic region.  However, she saw a surgeon 2 years ago for the same complaint and a subsequent ct scan did not demonstrate hernia.  She is tender in the above region, but there is no firm structure that could represent a sac, and her habitus prevents adequate exam.  There is no apparent asymmetry.  She should undergo new ct scan non-con, and will review to determine whether there is potentially surgical pathology.

## 2020-09-18 ENCOUNTER — RESULT REVIEW (OUTPATIENT)
Age: 80
End: 2020-09-18

## 2020-09-18 ENCOUNTER — OUTPATIENT (OUTPATIENT)
Dept: OUTPATIENT SERVICES | Facility: HOSPITAL | Age: 80
LOS: 1 days | Discharge: HOME | End: 2020-09-18
Payer: MEDICARE

## 2020-09-18 DIAGNOSIS — R10.9 UNSPECIFIED ABDOMINAL PAIN: ICD-10-CM

## 2020-09-18 PROCEDURE — 74176 CT ABD & PELVIS W/O CONTRAST: CPT | Mod: 26

## 2020-10-06 ENCOUNTER — OUTPATIENT (OUTPATIENT)
Dept: OUTPATIENT SERVICES | Facility: HOSPITAL | Age: 80
LOS: 1 days | Discharge: HOME | End: 2020-10-06

## 2020-10-06 ENCOUNTER — APPOINTMENT (OUTPATIENT)
Dept: NUTRITION | Facility: CLINIC | Age: 80
End: 2020-10-06

## 2020-10-08 ENCOUNTER — APPOINTMENT (OUTPATIENT)
Dept: SURGERY | Facility: CLINIC | Age: 80
End: 2020-10-08

## 2020-10-08 ENCOUNTER — APPOINTMENT (OUTPATIENT)
Dept: ENDOCRINOLOGY | Facility: CLINIC | Age: 80
End: 2020-10-08

## 2020-10-08 ENCOUNTER — OUTPATIENT (OUTPATIENT)
Dept: OUTPATIENT SERVICES | Facility: HOSPITAL | Age: 80
LOS: 1 days | Discharge: HOME | End: 2020-10-08

## 2020-10-08 VITALS
DIASTOLIC BLOOD PRESSURE: 73 MMHG | WEIGHT: 209 LBS | BODY MASS INDEX: 41.03 KG/M2 | HEIGHT: 60 IN | SYSTOLIC BLOOD PRESSURE: 203 MMHG | HEART RATE: 66 BPM

## 2020-10-08 DIAGNOSIS — E78.00 PURE HYPERCHOLESTEROLEMIA, UNSPECIFIED: ICD-10-CM

## 2020-10-08 RX ORDER — AMLODIPINE BESYLATE 5 MG/1
5 TABLET ORAL
Qty: 90 | Refills: 1 | Status: DISCONTINUED | COMMUNITY
Start: 2017-08-15 | End: 2020-10-08

## 2020-10-08 RX ORDER — LISINOPRIL 10 MG/1
10 TABLET ORAL
Qty: 90 | Refills: 1 | Status: DISCONTINUED | COMMUNITY
Start: 2019-02-05 | End: 2020-10-08

## 2020-10-08 NOTE — ASSESSMENT
[FreeTextEntry1] : 80 y.o F with PHM of DM, hypertension, CKD 4, DLD presented for routine follow up. \par \par # DM with neuropathy, nephropathy\par c/w Pioglitazone and ozempic at same dose\par HbA 1c from 2/20 9.4. Repeat Hb A1c\par Ophthalmology and podiatry follow up uptodate\par  \par #Hypertension\par -elevated 203/73mm Hg. Patient ran out of medications\par -Renewed amlodipine and started on ARB\par -Follow up CMP for creatinine level and Potassium\par -Nephrology follow up\par \par #CKD 4\par -Creatinine at baseline 2.2 but last CMP from inpatient admission showed elevated Cr 3.3. Patient needs to follow up with nephrology. Has PMD appointment scheduled for 10/15\par \par Counseling: The patient was counseled on carbohydrate consistent diet, diabetes foot care, long term vascular complications of diabetes, importance of diet and exercise to improve glycemic control, achieve weight loss and improve cardiovascular health. \par   [Diabetes Foot Care] : diabetes foot care [Long Term Vascular Complications] : long term vascular complications of diabetes [Carbohydrate Consistent Diet] : carbohydrate consistent diet [Importance of Diet and Exercise] : importance of diet and exercise to improve glycemic control, achieve weight loss and improve cardiovascular health

## 2020-10-08 NOTE — REASON FOR VISIT
[Follow - Up] : a follow-up visit [DM Type 2] : DM Type 2 [Weight Management/Obesity] : weight management/obesity [Other___] : [unfilled]

## 2020-10-08 NOTE — HISTORY OF PRESENT ILLNESS
[FreeTextEntry1] : 81 y/o Female with PMH of DM with neuropathy, CKD 4,HTN ,DLD is here for follow up. \par Her last Hb A1 c was checked in 2/2020 which was 9.4. Patient is currently on pioglitazone 30mg and ozempic 1mg every week. She reports having episodes of hypoglycemia with sweating and dizziness alternating with hyperglycemia with FS in 200s. \par She also c/o tingling and numbness in bilateral hands with history of carpal tunnel syndrome. Patient has been under stress as her sister passed away recently and did not take care of herself. \par Patient lives by herself and prefers taking care of herself.

## 2020-10-13 ENCOUNTER — OUTPATIENT (OUTPATIENT)
Dept: OUTPATIENT SERVICES | Facility: HOSPITAL | Age: 80
LOS: 1 days | Discharge: HOME | End: 2020-10-13

## 2020-10-13 ENCOUNTER — APPOINTMENT (OUTPATIENT)
Dept: PODIATRY | Facility: CLINIC | Age: 80
End: 2020-10-13
Payer: MEDICARE

## 2020-10-13 DIAGNOSIS — E66.01 MORBID (SEVERE) OBESITY DUE TO EXCESS CALORIES: ICD-10-CM

## 2020-10-13 DIAGNOSIS — E11.65 TYPE 2 DIABETES MELLITUS WITH HYPERGLYCEMIA: ICD-10-CM

## 2020-10-13 DIAGNOSIS — N18.32 CHRONIC KIDNEY DISEASE, STAGE 3B: ICD-10-CM

## 2020-10-13 LAB
ANION GAP SERPL CALC-SCNC: 9 MEQ/L
BUN SERPL-MCNC: 43 MG/DL
BUN/CREAT SERPL: 22.5 %
CALCIUM SERPL-MCNC: 8.9 MG/DL
CHLORIDE SERPL-SCNC: 109 MEQ/L
CO2 SERPL-SCNC: 23 MEQ/L
CREAT SERPL-MCNC: 1.91 MG/DL
GFR SERPL CREATININE-BSD FRML MDRD: 31
GLUCOSE SERPL-MCNC: 108 MG/DL
POTASSIUM SERPL-SCNC: 5.3 MMOL/L
SODIUM SERPL-SCNC: 141 MEQ/L

## 2020-10-13 PROCEDURE — 11721 DEBRIDE NAIL 6 OR MORE: CPT

## 2020-10-14 NOTE — HISTORY OF PRESENT ILLNESS
[FreeTextEntry1] : 80 year old F presents for continued management of mycotic nails x 10. \par Also complaints about bilateral dorsolateral 5th digit corn pain.

## 2020-10-14 NOTE — ASSESSMENT
[FreeTextEntry1] : \par Physical Exam\par Constitutional: alert and in no acute distress. \par Vascular: the pedal pulses are present. \par Skin:. dystrophic, thick nails x 10. 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 no open lesions appreciated. \par Bilateral 5th digit dorsolateral corn\par The sensory exam of the right foot showed diminished vibratory sensation at the level of the toes. The sensory exam of the left foot showed diminished vibratory sensation at the level of the toes. \par Monofilament Testing: normal tactile sensation with monofilament testing throughout right foot, normal tactile sensation with monofilament testing throughout left foot. \par Psychiatric: oriented to person, place, and time and insight and judgment were intact. \par  \par Assessment\par Controlled diabetes mellitus with diabetic polyneuropathy (250.60,357.2) (E11.42)\par Onychomycosis of toenail (110.1) (B35.1)\par \par -Aseptic debridement of all fungal nails. Patient has pain about the toes secondary to nail pressure and relates improvement with periodic debridement.\par -Aseptic debridement of dorsolateral 5th digit, bilateral feet corn debridement; \par \par -Return 3 month\par .

## 2020-10-15 ENCOUNTER — OUTPATIENT (OUTPATIENT)
Dept: OUTPATIENT SERVICES | Facility: HOSPITAL | Age: 80
LOS: 1 days | Discharge: HOME | End: 2020-10-15

## 2020-10-15 ENCOUNTER — APPOINTMENT (OUTPATIENT)
Dept: GERIATRICS | Facility: CLINIC | Age: 80
End: 2020-10-15
Payer: MEDICARE

## 2020-10-15 VITALS
SYSTOLIC BLOOD PRESSURE: 139 MMHG | HEIGHT: 60 IN | OXYGEN SATURATION: 76 % | BODY MASS INDEX: 42.41 KG/M2 | WEIGHT: 216 LBS | TEMPERATURE: 98 F | DIASTOLIC BLOOD PRESSURE: 83 MMHG

## 2020-10-15 PROCEDURE — 99214 OFFICE O/P EST MOD 30 MIN: CPT

## 2020-10-15 RX ORDER — GABAPENTIN 300 MG/1
300 CAPSULE ORAL
Qty: 90 | Refills: 3 | Status: ACTIVE | COMMUNITY
Start: 2019-05-02 | End: 1900-01-01

## 2020-10-15 RX ORDER — ATORVASTATIN CALCIUM 40 MG/1
40 TABLET, FILM COATED ORAL
Qty: 90 | Refills: 3 | Status: ACTIVE | COMMUNITY
Start: 2017-07-28 | End: 1900-01-01

## 2020-10-15 RX ORDER — ALBUTEROL SULFATE 2.5 MG/3ML
(2.5 MG/3ML) SOLUTION RESPIRATORY (INHALATION)
Qty: 180 | Refills: 5 | Status: ACTIVE | COMMUNITY
Start: 2018-05-02 | End: 1900-01-01

## 2020-10-15 RX ORDER — FUROSEMIDE 20 MG/1
20 TABLET ORAL
Qty: 90 | Refills: 3 | Status: ACTIVE | COMMUNITY
Start: 2018-10-30 | End: 1900-01-01

## 2020-10-15 RX ORDER — TRAMADOL HYDROCHLORIDE 50 MG/1
50 TABLET, COATED ORAL 3 TIMES DAILY
Qty: 60 | Refills: 0 | Status: ACTIVE | COMMUNITY
Start: 2020-10-15 | End: 1900-01-01

## 2020-10-15 RX ORDER — AMLODIPINE AND VALSARTAN 5; 160 MG/1; MG/1
5-160 TABLET, FILM COATED ORAL
Qty: 90 | Refills: 3 | Status: ACTIVE | COMMUNITY
Start: 2020-10-08 | End: 1900-01-01

## 2020-10-15 NOTE — PHYSICAL EXAM
[General Appearance - Alert] : alert [Normal Oral Mucosa] : normal oral mucosa [Sclera] : the sclera and conjunctiva were normal [] : no respiratory distress [Cranial Nerves] : cranial nerves 2-12 were intact [Neck Appearance] : the appearance of the neck was normal

## 2020-10-15 NOTE — END OF VISIT
[] : Resident [FreeTextEntry3] : SEEN WITH GERIATRICS TEAM - patient with diffuse joint pains; has e GFR of 11 - can't increase gabapentin or use NSAIDs; she is ambulating with single point cane - pains awaken her from sleep; so will check labs and also try tramadol at night.\par Also she has increasing UI and especially at night - will refer to urogyn DR Valentino at [facility].\par COntinue other maintenance medications.\par Receiving flu vaccine today.

## 2020-10-15 NOTE — HISTORY OF PRESENT ILLNESS
[FreeTextEntry1] : 77 y/o F w/ PMH of Controlled HTN on meds, DLD on statins, Asthma not in exacerbation, postherpetic neuralgia on gabapentin, CKD4, DM II on insulin presenting for follow up. Pt is following up with nephrologist and surgery. \par

## 2020-10-15 NOTE — ASSESSMENT
[FreeTextEntry1] : Uncontrolled DM II \par Pt being followed up by Endocrinology\par - Uptodate with podiatry\par - Pt on ozempic and gabirela per endo\par - Increasing Pioglitazone to 45mg q24hrs\par - f/u repeat HbA1c\par \par Right sided Inguinal Hernia\par - Pt being followed up by surgery\par \par Post herpetic neuralgia \par - patient's gabapentin was stopped during last visit because of dizziness and CKD stage 4\par - Gabapentin resumed PRN due to neuropathy by Endo\par - Resuming Gabapentin at 300mg q24 as per recommendations on patient's GFR\par \par R wrist swelling and pain: Likely post traumatic, No fractures noted in past according to patient\par - Resolved\par \par HTN controlled as of now\par - c/w Amlodipine, Valsartan and lasix\par \par Hypercholesterolemia \par - c/w lipitor 40 mg\par - ASA 81 stopped at last visit (No CAD)\par - Lipid profile\par \par CKD stage 4\par - Stable, follow up with nephro (Dr. Kleiner)\par - c/w Chlorthalidone as per nephro\par \par Asthma \par - Patient does not complain of any shortness of breath\par - symbicort, ventolin prn\par \par \par HCM\par Uptodate with Flu vaccine \par Colonoscopy last yr was normal according to patient\par Mammogram uptodate\par \par

## 2020-10-16 DIAGNOSIS — N18.9 CHRONIC KIDNEY DISEASE, UNSPECIFIED: ICD-10-CM

## 2020-10-16 DIAGNOSIS — E11.42 TYPE 2 DIABETES MELLITUS WITH DIABETIC POLYNEUROPATHY: ICD-10-CM

## 2020-10-16 DIAGNOSIS — I10 ESSENTIAL (PRIMARY) HYPERTENSION: ICD-10-CM

## 2020-10-16 DIAGNOSIS — J45.909 UNSPECIFIED ASTHMA, UNCOMPLICATED: ICD-10-CM

## 2020-10-16 DIAGNOSIS — G56.01 CARPAL TUNNEL SYNDROME, RIGHT UPPER LIMB: ICD-10-CM

## 2020-10-20 DIAGNOSIS — E11.9 TYPE 2 DIABETES MELLITUS WITHOUT COMPLICATIONS: ICD-10-CM

## 2020-10-22 LAB
ANION GAP SERPL CALC-SCNC: 17 MMOL/L
BASOPHILS # BLD AUTO: 0.03 K/UL
BASOPHILS NFR BLD AUTO: 0.7 %
BUN SERPL-MCNC: 82 MG/DL
CALCIUM SERPL-MCNC: 8 MG/DL
CHLORIDE SERPL-SCNC: 105 MMOL/L
CHOLEST SERPL-MCNC: 191 MG/DL
CHOLEST/HDLC SERPL: 3.3 RATIO
CO2 SERPL-SCNC: 16 MMOL/L
CREAT SERPL-MCNC: 3.7 MG/DL
EOSINOPHIL # BLD AUTO: 0.33 K/UL
EOSINOPHIL NFR BLD AUTO: 7.4 %
GLUCOSE SERPL-MCNC: 144 MG/DL
HCT VFR BLD CALC: 28.2 %
HDLC SERPL-MCNC: 59 MG/DL
HGB BLD-MCNC: 8.7 G/DL
IMM GRANULOCYTES NFR BLD AUTO: 1.1 %
LDLC SERPL CALC-MCNC: 107 MG/DL
LYMPHOCYTES # BLD AUTO: 1.24 K/UL
LYMPHOCYTES NFR BLD AUTO: 27.9 %
MAN DIFF?: NORMAL
MCHC RBC-ENTMCNC: 27.5 PG
MCHC RBC-ENTMCNC: 30.9 G/DL
MCV RBC AUTO: 89.2 FL
MONOCYTES # BLD AUTO: 0.58 K/UL
MONOCYTES NFR BLD AUTO: 13 %
NEUTROPHILS # BLD AUTO: 2.22 K/UL
NEUTROPHILS NFR BLD AUTO: 49.9 %
PLATELET # BLD AUTO: 211 K/UL
POTASSIUM SERPL-SCNC: 5.7 MMOL/L
RBC # BLD: 3.16 M/UL
RBC # FLD: 16 %
SODIUM SERPL-SCNC: 138 MMOL/L
TRIGL SERPL-MCNC: 127 MG/DL
WBC # FLD AUTO: 4.45 K/UL

## 2020-10-27 ENCOUNTER — APPOINTMENT (OUTPATIENT)
Dept: NEPHROLOGY | Facility: CLINIC | Age: 80
End: 2020-10-27
Payer: MEDICARE

## 2020-10-27 ENCOUNTER — OUTPATIENT (OUTPATIENT)
Dept: OUTPATIENT SERVICES | Facility: HOSPITAL | Age: 80
LOS: 1 days | Discharge: HOME | End: 2020-10-27

## 2020-10-27 VITALS
WEIGHT: 210 LBS | SYSTOLIC BLOOD PRESSURE: 138 MMHG | HEART RATE: 76 BPM | BODY MASS INDEX: 41.23 KG/M2 | HEIGHT: 60 IN | DIASTOLIC BLOOD PRESSURE: 72 MMHG | TEMPERATURE: 97.7 F

## 2020-10-27 LAB
GLUCOSE BLDC GLUCOMTR-MCNC: 103 MG/DL — HIGH (ref 70–99)
GLUCOSE SERPL-MCNC: 103

## 2020-10-27 PROCEDURE — 99214 OFFICE O/P EST MOD 30 MIN: CPT | Mod: GC

## 2020-10-27 RX ORDER — CHLORTHALIDONE 25 MG/1
25 TABLET ORAL DAILY
Qty: 30 | Refills: 0 | Status: DISCONTINUED | COMMUNITY
Start: 2020-02-25 | End: 2020-10-27

## 2020-10-27 NOTE — REVIEW OF SYSTEMS
[Eyesight Problems] : eyesight problems [Fever] : no fever [Chills] : no chills [Eye Pain] : no eye pain [Nasal Discharge] : no nasal discharge [Sore Throat] : no sore throat [Chest Pain] : no chest pain [Lower Ext Edema] : no lower extremity edema [Shortness Of Breath] : no shortness of breath [Cough] : no cough [Dysuria] : no dysuria [Confused] : no confusion [FreeTextEntry3] : left eye blurriness

## 2020-10-27 NOTE — END OF VISIT
[] : Resident [FreeTextEntry3] : # CKD 5 progressive\par last follow up was 2 y ago\par agree with holding CTD / increase lasix dose if needed ( HTN uncontrolled / edema )\par will need vascular follow up\par will need referral to transplant centers \par need to be seen q4 weeks / will check CBC / BMP q 4 weeks

## 2020-10-27 NOTE — ASSESSMENT
[FreeTextEntry1] : 80 year old female with PMH of HTN, DLD, asthma, CKD4, DM, post herpetic neuralgia, right sided hernai  here for follow up visit. \par \par #CKD stage V \par - Cr uptrending now 3.7 10/2020, (2.2 -> 2.9 -> 3.6 -> 3.7) \par - discontinue chlorthalidone\par - protein:creatinine ratio 3.0 in Feb 2019\par - check phos level \par - vascular consult for dialysis access \par - can increase lasix dose if uncontrolled  BP or worsening edema \par \par #hyperkalemia \par - K+ 5.7 10/2020\par - avoid foods high in potassium (tomato paste, banana, avocado) \par - start lokelma 5g q12 hrs \par \par #Anemia \par - Hgb 8.7 from 10.1 in march \par - F/u iron studies \par - May need CHELITA \par \par #HTN \par - 138/72 today \par - continue amlodipine, valsartan and lasix \par - can discontinue valsartan if pt remains hyperkalemic \par \par #DM - uncontrolled \par - A1c 9.4% (2/2020) \par - continue ozempic and pioglitazone \par \par #Vitamin D Deficiency \par - continue supplementation \par \par RTC in 1 months

## 2020-10-27 NOTE — PHYSICAL EXAM
[General Appearance - Alert] : alert [General Appearance - In No Acute Distress] : in no acute distress [General Appearance - Well Nourished] : well nourished [General Appearance - Well Developed] : well developed [Sclera] : the sclera and conjunctiva were normal [Extraocular Movements] : extraocular movements were intact [Examination Of The Oral Cavity] : the lips and gums were normal [Neck Appearance] : the appearance of the neck was normal [] : no respiratory distress [Respiration, Rhythm And Depth] : normal respiratory rhythm and effort [Auscultation Breath Sounds / Voice Sounds] : lungs were clear to auscultation bilaterally [Heart Rate And Rhythm] : heart rate was normal and rhythm regular [Murmurs] : no murmurs [Edema] : there was no peripheral edema [Veins - Varicosity Changes] : there were no varicosital changes [Bowel Sounds] : normal bowel sounds [Abdomen Soft] : soft [Abdomen Tenderness] : non-tender [FreeTextEntry1] : left cva tenderness

## 2020-10-27 NOTE — HISTORY OF PRESENT ILLNESS
[FreeTextEntry1] : 80 year old female with PMH of HTN, DLD, asthma, CKD4, DM here for follow up visit. Patient was last seen by nephrology in April 2019. Patient is having right sided nerve pain and took gabapentin for it which helped a little. She also feels sleepy and dizzy which she says might be due to her sugar. She did not eat breakfast this morning. Patient is also having back pain.

## 2020-10-29 ENCOUNTER — APPOINTMENT (OUTPATIENT)
Dept: INTERNAL MEDICINE | Facility: CLINIC | Age: 80
End: 2020-10-29
Payer: MEDICARE

## 2020-10-29 ENCOUNTER — OUTPATIENT (OUTPATIENT)
Dept: OUTPATIENT SERVICES | Facility: HOSPITAL | Age: 80
LOS: 1 days | Discharge: HOME | End: 2020-10-29

## 2020-10-29 VITALS
HEART RATE: 103 BPM | BODY MASS INDEX: 42.01 KG/M2 | HEIGHT: 60 IN | DIASTOLIC BLOOD PRESSURE: 76 MMHG | WEIGHT: 214 LBS | SYSTOLIC BLOOD PRESSURE: 163 MMHG

## 2020-10-29 DIAGNOSIS — Z01.419 ENCOUNTER FOR GYNECOLOGICAL EXAMINATION (GENERAL) (ROUTINE) W/OUT ABNORMAL FINDINGS: ICD-10-CM

## 2020-10-29 DIAGNOSIS — R10.9 UNSPECIFIED ABDOMINAL PAIN: ICD-10-CM

## 2020-10-29 DIAGNOSIS — M25.559 PAIN IN UNSPECIFIED HIP: ICD-10-CM

## 2020-10-29 PROCEDURE — 99213 OFFICE O/P EST LOW 20 MIN: CPT | Mod: GC

## 2020-10-29 NOTE — HISTORY OF PRESENT ILLNESS
[FreeTextEntry1] : 81 y/o female , LMP >30 years ago, s/p TAHBSO likely 2/2 ovarian cancer presents for pain in her right groin/thigh area where she has had episodes of shingles before. Patient states the pain has been present for "awhile" and hurts to the touch. Patient has extreme pain with movement of right hip- strongly suspicious for musculoskeletal type pain related to the hip. Patient states she has gained a lot of weight over the last few months. Patient otherwise has no complaints. No pain with urination, no change in urination frequency, no urinary leakage, no bleeding. CT scan results reviewed - no clear etiologies for current symptoms identified.

## 2020-10-29 NOTE — REVIEW OF SYSTEMS
[Abdominal Pain] : abdominal pain [Negative] : Heme/Lymph [FreeTextEntry7] : RLQ pain in area of prior shingles episode [FreeTextEntry9] : Has pain in right hip

## 2020-10-29 NOTE — PHYSICAL EXAM
[Appropriately responsive] : appropriately responsive [Alert] : alert [No Acute Distress] : no acute distress [No Lymphadenopathy] : no lymphadenopathy [Regular Rate Rhythm] : regular rate rhythm [No Murmurs] : no murmurs [Clear to Auscultation B/L] : clear to auscultation bilaterally [Soft] : soft [Non-tender] : non-tender [Non-distended] : non-distended [No HSM] : No HSM [No Lesions] : no lesions [No Mass] : no mass [Oriented x3] : oriented x3 [Examination Of The Breasts] : a normal appearance [No Masses] : no breast masses were palpable [Labia Majora] : normal [Labia Minora] : normal [Normal] : normal [Absent] : absent [Uterine Adnexae] : absent [No Discharge] : no discharge [Vulvar Atrophy] : vulvar atrophy [Atrophy] : atrophy [Cystocele] : a cystocele [FreeTextEntry2] : Obese; patient has gained weight

## 2020-10-29 NOTE — PLAN
[FreeTextEntry1] : 81 y/o female , LMP >30 years ago, s/p TAHBSO likely 2/2 ovarian cancer presents for pain in her right groin/thigh area\par \par 1) Right groin pain \par - is reproducible on hip flexion\par - does not appear to be gyn in etiology\par - abd pelvic CT scan (non contrast) on (2020)\par    a) no intra-abd/pelvic pathology\par    b) New anteroinferior T10 vertebral body focal lucent lesion or erosion. Differential diagnosis includes    spondyloarthritis or less likely neoplastic etiology. F/u with PMD.\par    c) Subcutaneous fluid density 6.4 x 1.9 cm structure posterior to right gluteus leonila; possible chronic    hematoma.\par \par - Given Hx of possible ovarian cancer 25 years ago would check CA-125\par - follow up vaginal cytology\par - check x-ray hip, folllow up with PMD\par \par 2) Patient c/o swelling of Mons pubis\par - no lesions found\par - pt with increase fatty tissue\par - pt with about 14 pound weight gain\par \par \par \par  Lab: 11

## 2020-11-06 ENCOUNTER — NON-APPOINTMENT (OUTPATIENT)
Age: 80
End: 2020-11-06

## 2020-11-09 ENCOUNTER — OUTPATIENT (OUTPATIENT)
Dept: OUTPATIENT SERVICES | Facility: HOSPITAL | Age: 80
LOS: 1 days | Discharge: HOME | End: 2020-11-09

## 2020-11-09 ENCOUNTER — NON-APPOINTMENT (OUTPATIENT)
Age: 80
End: 2020-11-09

## 2020-11-09 ENCOUNTER — APPOINTMENT (OUTPATIENT)
Dept: NUTRITION | Facility: CLINIC | Age: 80
End: 2020-11-09

## 2020-11-10 ENCOUNTER — OUTPATIENT (OUTPATIENT)
Dept: OUTPATIENT SERVICES | Facility: HOSPITAL | Age: 80
LOS: 1 days | Discharge: HOME | End: 2020-11-10
Payer: MEDICARE

## 2020-11-10 ENCOUNTER — APPOINTMENT (OUTPATIENT)
Dept: OPHTHALMOLOGY | Facility: CLINIC | Age: 80
End: 2020-11-10

## 2020-11-10 LAB
APPEARANCE UR: CLEAR
BILIRUB UR QL STRIP: NEGATIVE
COLOR UR: YELLOW
CREAT UR-MCNC: 86.3 MG/DL
GLUCOSE UR STRIP-MCNC: NEGATIVE MG/DL
HGB UR QL STRIP: NEGATIVE
KETONES UR STRIP-MCNC: NEGATIVE MG/DL
NITRITE UR QL STRIP: NEGATIVE
PH UR STRIP: 6
PROT UR STRIP-MCNC: >=300 MG/DL
PROT UR-MCNC: 228 MG/DL
PROT/CREAT UR: 2641.84 MG/G CRE
SP GR UR STRIP: 1.02
UROBILINOGEN UR STRIP-MCNC: 0.2 MG/DL
WBC URNS QL MICRO: NEGATIVE

## 2020-11-10 PROCEDURE — 92202 OPSCPY EXTND ON/MAC DRAW: CPT

## 2020-11-10 PROCEDURE — 92012 INTRM OPH EXAM EST PATIENT: CPT

## 2020-11-12 DIAGNOSIS — Z01.419 ENCOUNTER FOR GYNECOLOGICAL EXAMINATION (GENERAL) (ROUTINE) WITHOUT ABNORMAL FINDINGS: ICD-10-CM

## 2020-11-12 DIAGNOSIS — R10.9 UNSPECIFIED ABDOMINAL PAIN: ICD-10-CM

## 2020-11-12 DIAGNOSIS — M25.559 PAIN IN UNSPECIFIED HIP: ICD-10-CM

## 2020-11-17 LAB
ANION GAP SERPL CALC-SCNC: 17 MMOL/L
BACTERIA URNS QL MICRO: ABNORMAL
BUN SERPL-MCNC: 71 MG/DL
CALCIUM SERPL-MCNC: 9.1 MG/DL
CHLORIDE SERPL-SCNC: 104 MMOL/L
CO2 SERPL-SCNC: 18 MMOL/L
CREAT SERPL-MCNC: 4.2 MG/DL
FERRITIN SERPL-MCNC: 198 NG/ML
GLUCOSE SERPL-MCNC: 102 MG/DL
HYALINE CASTS URNS QL MICRO: NORMAL P/LPF
IRON SATN MFR SERPL: 24 %
IRON SERPL-MCNC: 79 UG/DL
PHOSPHATE SERPL-MCNC: 4.8 MG/DL
POTASSIUM SERPL-SCNC: 5.8 MMOL/L
SODIUM SERPL-SCNC: 139 MMOL/L
TIBC SERPL-MCNC: 325 UG/DL
UIBC SERPL-MCNC: 246 UG/DL
URINE COMP/EPITH (NORTH): ABNORMAL

## 2020-11-24 ENCOUNTER — APPOINTMENT (OUTPATIENT)
Dept: NEPHROLOGY | Facility: CLINIC | Age: 80
End: 2020-11-24
Payer: MEDICARE

## 2020-11-24 ENCOUNTER — OUTPATIENT (OUTPATIENT)
Dept: OUTPATIENT SERVICES | Facility: HOSPITAL | Age: 80
LOS: 1 days | Discharge: HOME | End: 2020-11-24

## 2020-11-24 VITALS
BODY MASS INDEX: 42.41 KG/M2 | HEIGHT: 60 IN | DIASTOLIC BLOOD PRESSURE: 65 MMHG | WEIGHT: 216 LBS | TEMPERATURE: 97.3 F | SYSTOLIC BLOOD PRESSURE: 135 MMHG | HEART RATE: 67 BPM

## 2020-11-24 DIAGNOSIS — H25.13 AGE-RELATED NUCLEAR CATARACT, BILATERAL: ICD-10-CM

## 2020-11-24 DIAGNOSIS — B02.29 OTHER POSTHERPETIC NERVOUS SYSTEM INVOLVEMENT: ICD-10-CM

## 2020-11-24 DIAGNOSIS — E66.01 MORBID (SEVERE) OBESITY DUE TO EXCESS CALORIES: ICD-10-CM

## 2020-11-24 DIAGNOSIS — H40.1130 PRIMARY OPEN-ANGLE GLAUCOMA, BILATERAL, STAGE UNSPECIFIED: ICD-10-CM

## 2020-11-24 DIAGNOSIS — H35.039 HYPERTENSIVE RETINOPATHY, UNSPECIFIED EYE: ICD-10-CM

## 2020-11-24 DIAGNOSIS — N18.9 CHRONIC KIDNEY DISEASE, UNSPECIFIED: ICD-10-CM

## 2020-11-24 DIAGNOSIS — E11.65 TYPE 2 DIABETES MELLITUS WITH HYPERGLYCEMIA: ICD-10-CM

## 2020-11-24 DIAGNOSIS — H34.8320 TRIBUTARY (BRANCH) RETINAL VEIN OCCLUSION, LEFT EYE, WITH MACULAR EDEMA: ICD-10-CM

## 2020-11-24 DIAGNOSIS — E11.22 TYPE 2 DIABETES MELLITUS WITH DIABETIC CHRONIC KIDNEY DISEASE: ICD-10-CM

## 2020-11-24 PROCEDURE — 99214 OFFICE O/P EST MOD 30 MIN: CPT | Mod: GC

## 2020-11-24 RX ORDER — SODIUM ZIRCONIUM CYCLOSILICATE 5 G/5G
5 POWDER, FOR SUSPENSION ORAL
Qty: 60 | Refills: 3 | Status: ACTIVE | COMMUNITY
Start: 2020-10-27 | End: 1900-01-01

## 2020-11-24 RX ORDER — ALCOHOL ANTISEPTIC PADS
PADS, MEDICATED (EA) TOPICAL
Qty: 100 | Refills: 2 | Status: ACTIVE | COMMUNITY
Start: 2019-02-19 | End: 1900-01-01

## 2020-11-24 NOTE — PHYSICAL EXAM
[General Appearance - Alert] : alert [General Appearance - In No Acute Distress] : in no acute distress [Sclera] : the sclera and conjunctiva were normal [PERRL With Normal Accommodation] : pupils were equal in size, round, and reactive to light [Extraocular Movements] : extraocular movements were intact [Outer Ear] : the ears and nose were normal in appearance [Oropharynx] : the oropharynx was normal [Neck Appearance] : the appearance of the neck was normal [Neck Cervical Mass (___cm)] : no neck mass was observed [Jugular Venous Distention Increased] : there was no jugular-venous distention [Thyroid Diffuse Enlargement] : the thyroid was not enlarged [Thyroid Nodule] : there were no palpable thyroid nodules [Auscultation Breath Sounds / Voice Sounds] : lungs were clear to auscultation bilaterally [Heart Rate And Rhythm] : heart rate was normal and rhythm regular [Heart Sounds] : normal S1 and S2 [Heart Sounds Gallop] : no gallops [Murmurs] : no murmurs [Heart Sounds Pericardial Friction Rub] : no pericardial rub [Full Pulse] : the pedal pulses are present [Edema] : there was no peripheral edema [Breast Appearance] : normal in appearance [Breast Palpation Mass] : no palpable masses [Bowel Sounds] : normal bowel sounds [Abdomen Soft] : soft [Abdomen Tenderness] : non-tender [Abdomen Mass (___ Cm)] : no abdominal mass palpated [Normal Sphincter Tone] : normal sphincter tone [No Rectal Mass] : no rectal mass [External Female Genitalia] : normal external genitalia [Urethral Meatus] : normal urethra [Urinary Bladder Findings] : the bladder was normal on palpation [Cervical Lymph Nodes Enlarged Posterior Bilaterally] : posterior cervical [Cervical Lymph Nodes Enlarged Anterior Bilaterally] : anterior cervical [Supraclavicular Lymph Nodes Enlarged Bilaterally] : supraclavicular [Axillary Lymph Nodes Enlarged Bilaterally] : axillary [Femoral Lymph Nodes Enlarged Bilaterally] : femoral [Inguinal Lymph Nodes Enlarged Bilaterally] : inguinal [No CVA Tenderness] : no ~M costovertebral angle tenderness [No Spinal Tenderness] : no spinal tenderness [Skin Color & Pigmentation] : normal skin color and pigmentation [Skin Turgor] : normal skin turgor [] : no rash [Deep Tendon Reflexes (DTR)] : deep tendon reflexes were 2+ and symmetric [Sensation] : the sensory exam was normal to light touch and pinprick [No Focal Deficits] : no focal deficits [Oriented To Time, Place, And Person] : oriented to person, place, and time [Impaired Insight] : insight and judgment were intact [Affect] : the affect was normal [Occult Blood Positive] : stool was negative for occult blood [FreeTextEntry1] : radicular pain down the right lower back to the side of the hip

## 2020-11-24 NOTE — HISTORY OF PRESENT ILLNESS
[FreeTextEntry1] : 80 year old female with PMH of HTN, DLD, asthma, CKD4, DM here for follow up visit. Patient was last seen by nephrology in October 2020. Patient notes that she continues to have right side back pain that comes down to the side of the right hip. Patient continues to take gabapentin 300mg once a day. She states that is has been moderately helpful. Otherwise patient denies any acute events.

## 2020-11-24 NOTE — ASSESSMENT
[FreeTextEntry1] : 80 year old female with PMH of HTN, DLD, asthma, CKD4, DM, post herpetic neuralgia, right sided hernia here for follow up visit. \par \par #CKD stage V \par - Cr uptrending now 3.7 10/2020, (2.2 -> 2.9 -> 3.6 -> 3.7 -> 4.2) \par - protein:creatinine ratio 3.0 in Feb 2019\par - check phos level, repeat BMP\par - vascular consult for dialysis access / did not follow up \par - can increase lasix dose if uncontrolled BP or worsening edema \par - PAtient is reluctant to start RRT / family member was on HD \par \par #hyperkalemia \par - K+ 5.8 11/2020\par - avoid foods high in potassium (tomato paste, banana, avocado) \par - continue  lokelma increase to 5g q12 hrs \par - may need to stop ARB if K persistently > 5.5 \par \par #Anemia \par - Hgb 8.7 in October from 10.1 in march \par - F/u iron studies \par - May need CHELITA soon  \par \par #HTN \par - 135/65 today \par - continue amlodipine, valsartan and lasix \par - repeat BMP in 2 weeks, if potassium is elevated considered d/c valsartan\par \par #DM - uncontrolled \par - A1c 9.4% (2/2020) \par - continue ozempic and pioglitazone / will need better control\par \par #Vitamin D Deficiency \par - continue supplementation \par \par RTC in 1 months. \par will refer to OP HD unit for set up and management ( vascular/ transplant referral/ nutrition follow up)

## 2020-11-24 NOTE — END OF VISIT
[] : Resident [FreeTextEntry3] : Patient seen and examined with renal resident \par Agree with above\par Advanced CKD / CKD 5/ will need to start RRT soon\par low K diet \par continue binders\par follow BMP\par better DM control\par referral to OP HD unit

## 2020-12-01 LAB
CANCER AG125 SERPL-ACNC: 14 U/ML
HPV HIGH+LOW RISK DNA PNL CVX: NOT DETECTED

## 2020-12-07 ENCOUNTER — APPOINTMENT (OUTPATIENT)
Dept: VASCULAR SURGERY | Facility: CLINIC | Age: 80
End: 2020-12-07
Payer: MEDICARE

## 2020-12-07 VITALS — BODY MASS INDEX: 40.82 KG/M2 | WEIGHT: 209 LBS | TEMPERATURE: 97.1 F

## 2020-12-07 VITALS — DIASTOLIC BLOOD PRESSURE: 70 MMHG | SYSTOLIC BLOOD PRESSURE: 130 MMHG

## 2020-12-07 PROCEDURE — 99203 OFFICE O/P NEW LOW 30 MIN: CPT

## 2020-12-07 RX ORDER — LATANOPROST/PF 0.005 %
0.01 DROPS OPHTHALMIC (EYE)
Qty: 8 | Refills: 0 | Status: ACTIVE | COMMUNITY
Start: 2020-11-10

## 2020-12-07 RX ORDER — LISINOPRIL 20 MG/1
20 TABLET ORAL
Qty: 180 | Refills: 0 | Status: ACTIVE | COMMUNITY
Start: 2020-06-01

## 2020-12-07 NOTE — DATA REVIEWED
[FreeTextEntry1] : Vein mapping studies today showed no adequate vein for conduit for AVF creation in the upper extremities bilaterally.

## 2020-12-07 NOTE — HISTORY OF PRESENT ILLNESS
[FreeTextEntry1] : 79 y/o female with CKD, not on HD yet, sent in by Nephrologist for evaluation of creation of AVF. She is right hand dominant.

## 2020-12-07 NOTE — ASSESSMENT
[FreeTextEntry1] : 79 y/o female with CKD, not on HD yet, sent in by Nephrologist for evaluation of creation of AVF. She is right hand dominant.\par \par Vein mapping studies today showed no adequate vein for conduit for AVF creation in the upper extremities bilaterally.\par \par She is hesitant to start hemodialysis. I have discussed the surgical procedure of AV graft placement with her and she wants to speak to her son. She will call back to schedule surgery.

## 2020-12-07 NOTE — CONSULT LETTER
[Dear  ___] : Dear  [unfilled], [Consult Letter:] : I had the pleasure of evaluating your patient, [unfilled]. [Please see my note below.] : Please see my note below. [FreeTextEntry2] : Dear Dr. Morton Kleiner,

## 2020-12-16 ENCOUNTER — APPOINTMENT (OUTPATIENT)
Dept: PODIATRY | Facility: CLINIC | Age: 80
End: 2020-12-16
Payer: MEDICARE

## 2020-12-16 ENCOUNTER — APPOINTMENT (OUTPATIENT)
Dept: NUTRITION | Facility: CLINIC | Age: 80
End: 2020-12-16

## 2020-12-16 ENCOUNTER — OUTPATIENT (OUTPATIENT)
Dept: OUTPATIENT SERVICES | Facility: HOSPITAL | Age: 80
LOS: 1 days | Discharge: HOME | End: 2020-12-16

## 2020-12-16 DIAGNOSIS — M79.671 PAIN IN RIGHT FOOT: ICD-10-CM

## 2020-12-16 DIAGNOSIS — M79.672 PAIN IN LEFT FOOT: ICD-10-CM

## 2020-12-16 PROCEDURE — 11721 DEBRIDE NAIL 6 OR MORE: CPT

## 2020-12-16 NOTE — END OF VISIT
[Resident] : Resident [] : Resident [FreeTextEntry3] : follow up 2 months for DM foot evaluation [FreeTextEntry2] : mycotic nails debrided to patients tolerance

## 2020-12-16 NOTE — ASSESSMENT
[FreeTextEntry1] : -Patient was seen and evaluated; All Questions and concerns were addressed and answered; \par -Aseptic debridement of all fungal nails. Patient has pain about the toes secondary to nail pressure and relates improvement with periodic debridement.\par -Aseptic debridement of dorsolateral 5th digit, bilateral feet corn debridement; \par -Return 3 month

## 2020-12-16 NOTE — PHYSICAL EXAM
[Vibration Dec.] : diminished vibratory sensation at the level of the toes [FreeTextEntry1] : dystrophic, thick nails x 10. 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 no open lesions appreciated. [Diminished Throughout Right Foot] : normal sensation with monofilament testing throughout right foot [Diminished Throughout Left Foot] : normal sensation with monofilament testing throughout left foot

## 2020-12-23 PROBLEM — N39.0 COMPLICATED UTI (URINARY TRACT INFECTION): Status: RESOLVED | Noted: 2020-06-11 | Resolved: 2020-12-23

## 2020-12-29 ENCOUNTER — APPOINTMENT (OUTPATIENT)
Dept: NEPHROLOGY | Facility: CLINIC | Age: 80
End: 2020-12-29

## 2020-12-29 ENCOUNTER — LABORATORY RESULT (OUTPATIENT)
Age: 80
End: 2020-12-29

## 2020-12-31 RX ORDER — BUDESONIDE AND FORMOTEROL FUMARATE DIHYDRATE 160; 4.5 UG/1; UG/1
160-4.5 AEROSOL RESPIRATORY (INHALATION)
Qty: 3 | Refills: 0 | Status: ACTIVE | COMMUNITY
Start: 2018-04-16 | End: 1900-01-01

## 2021-01-05 LAB
ANION GAP SERPL CALC-SCNC: 15 MMOL/L
BUN SERPL-MCNC: 70 MG/DL
CALCIUM SERPL-MCNC: 8.9 MG/DL
CHLORIDE SERPL-SCNC: 108 MMOL/L
CO2 SERPL-SCNC: 17 MMOL/L
CREAT SERPL-MCNC: 4 MG/DL
GLUCOSE SERPL-MCNC: 152 MG/DL
PHOSPHATE SERPL-MCNC: 5.2 MG/DL
POTASSIUM SERPL-SCNC: 5.3 MMOL/L
SODIUM SERPL-SCNC: 140 MMOL/L

## 2021-01-07 ENCOUNTER — APPOINTMENT (OUTPATIENT)
Dept: ENDOCRINOLOGY | Facility: CLINIC | Age: 81
End: 2021-01-07

## 2021-01-12 ENCOUNTER — APPOINTMENT (OUTPATIENT)
Dept: PODIATRY | Facility: CLINIC | Age: 81
End: 2021-01-12
Payer: MEDICARE

## 2021-01-12 ENCOUNTER — APPOINTMENT (OUTPATIENT)
Dept: NUTRITION | Facility: CLINIC | Age: 81
End: 2021-01-12
Payer: MEDICARE

## 2021-01-12 ENCOUNTER — OUTPATIENT (OUTPATIENT)
Dept: OUTPATIENT SERVICES | Facility: HOSPITAL | Age: 81
LOS: 1 days | Discharge: HOME | End: 2021-01-12

## 2021-01-12 DIAGNOSIS — B35.1 TINEA UNGUIUM: ICD-10-CM

## 2021-01-12 PROCEDURE — 99213 OFFICE O/P EST LOW 20 MIN: CPT | Mod: 25

## 2021-01-12 PROCEDURE — 11721 DEBRIDE NAIL 6 OR MORE: CPT

## 2021-01-12 RX ORDER — AMMONIUM LACTATE 12 %
12 CREAM (GRAM) TOPICAL TWICE DAILY
Qty: 1 | Refills: 3 | Status: ACTIVE | COMMUNITY
Start: 2021-01-12 | End: 1900-01-01

## 2021-01-13 PROBLEM — B35.1 ONYCHOMYCOSIS OF TOENAIL: Status: ACTIVE | Noted: 2017-08-14

## 2021-01-13 RX ORDER — SULFAMETHOXAZOLE AND TRIMETHOPRIM 800; 160 MG/1; MG/1
800-160 TABLET ORAL TWICE DAILY
Qty: 14 | Refills: 0 | Status: COMPLETED | COMMUNITY
Start: 2020-06-11 | End: 2020-06-18

## 2021-01-13 NOTE — REVIEW OF SYSTEMS
[Change In Skin Texture] : change in skin texture [Difficult To Trim] : nails are difficult to trim [Toenail Deformity] : toenail deformity [Toenail Discoloration] : toenail discoloration [Toenail Thickening] : toenail thickening [Negative] : Musculoskeletal

## 2021-01-13 NOTE — PHYSICAL EXAM
[General Appearance - Alert] : alert [General Appearance - In No Acute Distress] : in no acute distress [Ankle Swelling Bilaterally] : bilaterally  [___ +] : bilateral [unfilled]+ pitting edema to the ankles [1+] : left foot dorsalis pedis 1+ [Vibration Dec.] : diminished vibratory sensation at the level of the toes [Oriented To Time, Place, And Person] : oriented to person, place, and time [Impaired Insight] : insight and judgment were intact [Foot Ulcer] : no foot ulcer [FreeTextEntry1] : thick, dystrophic, discolored nails with subungual debris x 10, diffuse hyperkeratosis b/l heels [Diminished Throughout Right Foot] : normal sensation with monofilament testing throughout right foot [Diminished Throughout Left Foot] : normal sensation with monofilament testing throughout left foot

## 2021-01-13 NOTE — ASSESSMENT
[FreeTextEntry1] : -Loss of protective sensation: yes  \par -Presence of foot deformity:    no \par -presence of pre-ulcerative lesion:  no\par   -hemorrhage into callus: s no\par -atrophy of heel or metatarsal fat pads:  no\par \par -Diabetic neurovascular foot assessment  performed. \par -Discussed with patient diabetic foot hygiene.Patient instructed to regularly check the bottom of the feet\par -Patient given a diabetic foot education sheet\par -Aseptic debridement of all fungal nails.  Patient has pain about the toes secondary to nail pressure and relates improvement with periodic debridement.\par -Rx ammonium lactate\par -return 2 months\par

## 2021-01-13 NOTE — HISTORY OF PRESENT ILLNESS
[FreeTextEntry1] : 81 y/o female here today for management diabetic foot evaluation and management of mycotic nails. Denies tingling/burning in the feet. last a1c 9.4% 2/2020

## 2021-01-19 DIAGNOSIS — E11.9 TYPE 2 DIABETES MELLITUS WITHOUT COMPLICATIONS: ICD-10-CM

## 2021-02-10 ENCOUNTER — APPOINTMENT (OUTPATIENT)
Dept: PODIATRY | Facility: CLINIC | Age: 81
End: 2021-02-10

## 2021-02-10 ENCOUNTER — APPOINTMENT (OUTPATIENT)
Dept: NUTRITION | Facility: CLINIC | Age: 81
End: 2021-02-10

## 2021-03-11 ENCOUNTER — OUTPATIENT (OUTPATIENT)
Dept: OUTPATIENT SERVICES | Facility: HOSPITAL | Age: 81
LOS: 1 days | Discharge: HOME | End: 2021-03-11

## 2021-03-11 ENCOUNTER — APPOINTMENT (OUTPATIENT)
Dept: GERIATRICS | Facility: CLINIC | Age: 81
End: 2021-03-11
Payer: MEDICARE

## 2021-03-11 VITALS — RESPIRATION RATE: 18 BRPM

## 2021-03-11 PROCEDURE — 99442: CPT

## 2021-03-11 NOTE — ASSESSMENT
[FreeTextEntry1] : Elderly patient with diabetes, hypertension and CKD; is compliant with medications; no overt CV symptoms; does not need medications  renewed at this time; counseled on importance of pursuing COVID vaccine; will schedule labs for the spring.

## 2021-03-11 NOTE — HISTORY OF PRESENT ILLNESS
[FreeTextEntry1] : 81 year old with PMHx listed below; she has been feeling well; no known COVID infections - has been tested and was negative; states that with restrictions and staying home her appetite is "too good" and admits she has gained weight.\par She is compliant with her medications\par "Nothing is bothering me"\par Denies any falls\par no Ed or UC visits.

## 2021-03-11 NOTE — REASON FOR VISIT
[Home] : at home, [unfilled] , at the time of the visit. [Medical Office: ([facility])___] : at the medical office located in  [Verbal consent obtained from patient] : the patient, [unfilled] [Follow-Up] : a follow-up visit

## 2021-03-11 NOTE — REVIEW OF SYSTEMS
[Discharge From Eyes] : no purulent discharge from the eyes [Eyes Itch] : no itching of the eyes [Earache] : no earache [Chest Pain] : no chest pain [Lower Ext Edema] : no lower extremity edema [Shortness Of Breath] : no shortness of breath [Cough] : no cough [Abdominal Pain] : no abdominal pain [Vomiting] : no vomiting [Diarrhea] : no diarrhea

## 2021-03-12 ENCOUNTER — RX RENEWAL (OUTPATIENT)
Age: 81
End: 2021-03-12

## 2021-03-12 ENCOUNTER — APPOINTMENT (OUTPATIENT)
Dept: PODIATRY | Facility: CLINIC | Age: 81
End: 2021-03-12

## 2021-03-12 DIAGNOSIS — I10 ESSENTIAL (PRIMARY) HYPERTENSION: ICD-10-CM

## 2021-03-12 DIAGNOSIS — E11.42 TYPE 2 DIABETES MELLITUS WITH DIABETIC POLYNEUROPATHY: ICD-10-CM

## 2021-03-12 DIAGNOSIS — N18.9 CHRONIC KIDNEY DISEASE, UNSPECIFIED: ICD-10-CM

## 2021-04-11 ENCOUNTER — RX RENEWAL (OUTPATIENT)
Age: 81
End: 2021-04-11

## 2021-04-11 RX ORDER — ERGOCALCIFEROL 1.25 MG/1
1.25 MG CAPSULE, LIQUID FILLED ORAL
Qty: 13 | Refills: 3 | Status: ACTIVE | COMMUNITY
Start: 2021-04-11 | End: 1900-01-01

## 2021-04-13 ENCOUNTER — NON-APPOINTMENT (OUTPATIENT)
Age: 81
End: 2021-04-13

## 2021-05-12 NOTE — ED PROVIDER NOTE - CARE PROVIDER_API CALL
Krishna Penn (MD)  Orthopaedic Surgery  [address] [place] [address]  Phone: [phone]  Fax: [phone] Staging Info: By selecting yes to the question above you will include information on AJCC 8 tumor staging in your Mohs note. Information on tumor staging will be automatically added for SCCs on the head and neck. AJCC 8 includes tumor size, tumor depth, perineural involvement and bone invasion.

## 2021-08-26 NOTE — PATIENT PROFILE ADULT. - PRESSURE ULCER(S)
----- Message from Estephania Angelo DO sent at 8/26/2021  9:57 AM CDT -----  Please let Barbie and her parents know that her complete blood count, iron levels, hemoglobin A1C (measure of blood glucose over the past 3 months) are all normal. Her lipids, however, are still notably elevated. Due to her high cholesterol, triglycerides and LDL, she should see Dr. Jones in the [facility] via the Pediatric Cardiology department to evaluate that further. I will place a service to referral.  Please provide them the number to call for this appointment ([phone]). Thank you!   no

## 2021-12-27 ENCOUNTER — EMERGENCY (EMERGENCY)
Facility: HOSPITAL | Age: 81
LOS: 0 days | Discharge: HOME | End: 2021-12-27
Attending: EMERGENCY MEDICINE | Admitting: EMERGENCY MEDICINE
Payer: MEDICARE

## 2021-12-27 VITALS
OXYGEN SATURATION: 99 % | HEART RATE: 71 BPM | SYSTOLIC BLOOD PRESSURE: 147 MMHG | TEMPERATURE: 98 F | DIASTOLIC BLOOD PRESSURE: 62 MMHG | RESPIRATION RATE: 20 BRPM

## 2021-12-27 VITALS
HEART RATE: 68 BPM | OXYGEN SATURATION: 100 % | WEIGHT: 225.09 LBS | RESPIRATION RATE: 22 BRPM | DIASTOLIC BLOOD PRESSURE: 69 MMHG | TEMPERATURE: 99 F | SYSTOLIC BLOOD PRESSURE: 158 MMHG

## 2021-12-27 DIAGNOSIS — N18.9 CHRONIC KIDNEY DISEASE, UNSPECIFIED: ICD-10-CM

## 2021-12-27 DIAGNOSIS — R05.9 COUGH, UNSPECIFIED: ICD-10-CM

## 2021-12-27 DIAGNOSIS — J45.909 UNSPECIFIED ASTHMA, UNCOMPLICATED: ICD-10-CM

## 2021-12-27 DIAGNOSIS — Z79.82 LONG TERM (CURRENT) USE OF ASPIRIN: ICD-10-CM

## 2021-12-27 DIAGNOSIS — R10.9 UNSPECIFIED ABDOMINAL PAIN: ICD-10-CM

## 2021-12-27 DIAGNOSIS — E11.9 TYPE 2 DIABETES MELLITUS WITHOUT COMPLICATIONS: ICD-10-CM

## 2021-12-27 DIAGNOSIS — Z20.822 CONTACT WITH AND (SUSPECTED) EXPOSURE TO COVID-19: ICD-10-CM

## 2021-12-27 DIAGNOSIS — I12.9 HYPERTENSIVE CHRONIC KIDNEY DISEASE WITH STAGE 1 THROUGH STAGE 4 CHRONIC KIDNEY DISEASE, OR UNSPECIFIED CHRONIC KIDNEY DISEASE: ICD-10-CM

## 2021-12-27 LAB
ALBUMIN SERPL ELPH-MCNC: 3.8 G/DL — SIGNIFICANT CHANGE UP (ref 3.5–5.2)
ALP SERPL-CCNC: 90 U/L — SIGNIFICANT CHANGE UP (ref 30–115)
ALT FLD-CCNC: 7 U/L — SIGNIFICANT CHANGE UP (ref 0–41)
ANION GAP SERPL CALC-SCNC: 18 MMOL/L — HIGH (ref 7–14)
APPEARANCE UR: CLEAR — SIGNIFICANT CHANGE UP
AST SERPL-CCNC: 13 U/L — SIGNIFICANT CHANGE UP (ref 0–41)
BACTERIA # UR AUTO: NEGATIVE — SIGNIFICANT CHANGE UP
BASOPHILS # BLD AUTO: 0.02 K/UL — SIGNIFICANT CHANGE UP (ref 0–0.2)
BASOPHILS NFR BLD AUTO: 0.3 % — SIGNIFICANT CHANGE UP (ref 0–1)
BILIRUB SERPL-MCNC: 0.2 MG/DL — SIGNIFICANT CHANGE UP (ref 0.2–1.2)
BILIRUB UR-MCNC: NEGATIVE — SIGNIFICANT CHANGE UP
BLD GP AB SCN SERPL QL: SIGNIFICANT CHANGE UP
BUN SERPL-MCNC: 79 MG/DL — CRITICAL HIGH (ref 10–20)
CALCIUM SERPL-MCNC: 9.3 MG/DL — SIGNIFICANT CHANGE UP (ref 8.5–10.1)
CHLORIDE SERPL-SCNC: 108 MMOL/L — SIGNIFICANT CHANGE UP (ref 98–110)
CO2 SERPL-SCNC: 15 MMOL/L — LOW (ref 17–32)
COLOR SPEC: SIGNIFICANT CHANGE UP
CREAT SERPL-MCNC: 4.7 MG/DL — CRITICAL HIGH (ref 0.7–1.5)
DIFF PNL FLD: SIGNIFICANT CHANGE UP
EOSINOPHIL # BLD AUTO: 0.28 K/UL — SIGNIFICANT CHANGE UP (ref 0–0.7)
EOSINOPHIL NFR BLD AUTO: 4 % — SIGNIFICANT CHANGE UP (ref 0–8)
EPI CELLS # UR: 2 /HPF — SIGNIFICANT CHANGE UP (ref 0–5)
GLUCOSE SERPL-MCNC: 128 MG/DL — HIGH (ref 70–99)
GLUCOSE UR QL: SIGNIFICANT CHANGE UP
HCT VFR BLD CALC: 27.6 % — LOW (ref 37–47)
HGB BLD-MCNC: 8.6 G/DL — LOW (ref 12–16)
HYALINE CASTS # UR AUTO: 1 /LPF — SIGNIFICANT CHANGE UP (ref 0–7)
IMM GRANULOCYTES NFR BLD AUTO: 0.3 % — SIGNIFICANT CHANGE UP (ref 0.1–0.3)
KETONES UR-MCNC: NEGATIVE — SIGNIFICANT CHANGE UP
LACTATE SERPL-SCNC: 1.4 MMOL/L — SIGNIFICANT CHANGE UP (ref 0.7–2)
LEUKOCYTE ESTERASE UR-ACNC: ABNORMAL
LIDOCAIN IGE QN: 94 U/L — HIGH (ref 7–60)
LYMPHOCYTES # BLD AUTO: 1.84 K/UL — SIGNIFICANT CHANGE UP (ref 1.2–3.4)
LYMPHOCYTES # BLD AUTO: 26.6 % — SIGNIFICANT CHANGE UP (ref 20.5–51.1)
MCHC RBC-ENTMCNC: 26.5 PG — LOW (ref 27–31)
MCHC RBC-ENTMCNC: 31.2 G/DL — LOW (ref 32–37)
MCV RBC AUTO: 85.2 FL — SIGNIFICANT CHANGE UP (ref 81–99)
MONOCYTES # BLD AUTO: 0.94 K/UL — HIGH (ref 0.1–0.6)
MONOCYTES NFR BLD AUTO: 13.6 % — HIGH (ref 1.7–9.3)
NEUTROPHILS # BLD AUTO: 3.83 K/UL — SIGNIFICANT CHANGE UP (ref 1.4–6.5)
NEUTROPHILS NFR BLD AUTO: 55.2 % — SIGNIFICANT CHANGE UP (ref 42.2–75.2)
NITRITE UR-MCNC: NEGATIVE — SIGNIFICANT CHANGE UP
NRBC # BLD: 0 /100 WBCS — SIGNIFICANT CHANGE UP (ref 0–0)
PH UR: 7 — SIGNIFICANT CHANGE UP (ref 5–8)
PLATELET # BLD AUTO: 258 K/UL — SIGNIFICANT CHANGE UP (ref 130–400)
POTASSIUM SERPL-MCNC: 5.7 MMOL/L — HIGH (ref 3.5–5)
POTASSIUM SERPL-SCNC: 5.7 MMOL/L — HIGH (ref 3.5–5)
PROT SERPL-MCNC: 6.8 G/DL — SIGNIFICANT CHANGE UP (ref 6–8)
PROT UR-MCNC: ABNORMAL
RBC # BLD: 3.24 M/UL — LOW (ref 4.2–5.4)
RBC # FLD: 16.7 % — HIGH (ref 11.5–14.5)
RBC CASTS # UR COMP ASSIST: 5 /HPF — HIGH (ref 0–4)
SARS-COV-2 RNA SPEC QL NAA+PROBE: SIGNIFICANT CHANGE UP
SODIUM SERPL-SCNC: 141 MMOL/L — SIGNIFICANT CHANGE UP (ref 135–146)
SP GR SPEC: 1.01 — SIGNIFICANT CHANGE UP (ref 1.01–1.03)
UROBILINOGEN FLD QL: SIGNIFICANT CHANGE UP
WBC # BLD: 6.93 K/UL — SIGNIFICANT CHANGE UP (ref 4.8–10.8)
WBC # FLD AUTO: 6.93 K/UL — SIGNIFICANT CHANGE UP (ref 4.8–10.8)
WBC UR QL: 28 /HPF — HIGH (ref 0–5)

## 2021-12-27 PROCEDURE — 71045 X-RAY EXAM CHEST 1 VIEW: CPT | Mod: 26

## 2021-12-27 PROCEDURE — 76937 US GUIDE VASCULAR ACCESS: CPT | Mod: 26

## 2021-12-27 PROCEDURE — 36000 PLACE NEEDLE IN VEIN: CPT

## 2021-12-27 PROCEDURE — 74176 CT ABD & PELVIS W/O CONTRAST: CPT | Mod: 26,MA

## 2021-12-27 PROCEDURE — 99285 EMERGENCY DEPT VISIT HI MDM: CPT | Mod: 25

## 2021-12-27 RX ORDER — MORPHINE SULFATE 50 MG/1
4 CAPSULE, EXTENDED RELEASE ORAL ONCE
Refills: 0 | Status: DISCONTINUED | OUTPATIENT
Start: 2021-12-27 | End: 2021-12-27

## 2021-12-27 RX ADMIN — MORPHINE SULFATE 4 MILLIGRAM(S): 50 CAPSULE, EXTENDED RELEASE ORAL at 10:59

## 2021-12-27 NOTE — ED PROVIDER NOTE - PROGRESS NOTE DETAILS
BH: Patient's labs, UA, CT, no acute intra-abdominal process identified at this time, abdomen soft, non-tender. No indication for admission or further emergent evaluation at this time. Was explained that the source of their symptoms is unclear and that the patient should still follow up with their primary physician or gastroenterology for further evaluation and management. These elements were discussed with the patient and they are amenable to outpatient follow-up at this time.    The patient was given detailed return precautions and advised to return to the emergency department in 2-3 days if not improving or sooner if any new symptoms develop, symptoms worsened or for any concerns. The patient was offered the opportunity to ask questions and verbalized that they understand the diagnosis and discharge instructions. BH: Incidental radiographic findings were discussed with the patient and a copy of the findings were given to the patient. The patient was advised to follow up as an outpatient for further evaluation and management of these findings. The patient verbalized that they understand these instructions.

## 2021-12-27 NOTE — ED PROVIDER NOTE - NS ED ROS FT
Constitutional: No fever, chills.  Eyes: No visual changes, eye pain or discharge.  ENMT: No hearing changes, pain, discharge or infections.  Cardiac: No chest pain, SOB or edema.  Respiratory: No cough or respiratory distress.   : No dysuria, frequency or burning.  MS: No myalgia, muscle weakness, joint pain or back pain.  Neuro: No headache or focal weakness. No LOC.  Skin: No skin rash.   Except as documented in the HPI, all other systems are negative.

## 2021-12-27 NOTE — ED PROVIDER NOTE - PHYSICAL EXAMINATION
VSS, awake, alert, oropharynx clear, mmm, no jaundice, skin rash or lesions, chest CTAB, non-labored breathing, no w/r/r, +S1/S2, RRR, no m/r/g, abdomen soft, mild-od diffuse ttp w/o peritoneal signs, ND, +BS, no hernias or distention, no pulsatile masses or bruits appreciated, no CVA tenderness, no peripheral edema or deformities, alert, clear speech.

## 2021-12-27 NOTE — ED PROCEDURE NOTE - NS_EDPROVIDERDISPOUSERTYPE_ED_A_ED
Stable
Attending Attestation (For Attendings USE Only)...

## 2021-12-27 NOTE — ED PROCEDURE NOTE - ATTENDING CONTRIBUTION TO CARE
I personally supervised the study. I reviewed the images and agree with the interpretation and have edited where appropriate.

## 2021-12-27 NOTE — ED PROCEDURE NOTE - US CPT CODES
24082 Abdominal Ultrasound (GB/FAST)
14182 Retroperitioneal Abdominal (Aorta/Renal)
21377 Guidance for Vascular Access

## 2021-12-27 NOTE — ED PROCEDURE NOTE - PROCEDURE NAME, MLM
Point of Care Ultrasound FAST
Point of Care Ultrasound Renal
Point of Care Ultrasound Vascular Access

## 2021-12-27 NOTE — ED PROVIDER NOTE - OBJECTIVE STATEMENT
80 y/o female h/o HTN, DM, asthma, CKD, gallstones, JUDAH, non-smoker p/w abdominal x yesterday, persistent, radiates to back, denies modifying factors, +cough, passing flatus, denies fever, n/v/d, anorexia, respiratory sx, change in bowel habits or urinary sx, vaginal d/c or other associated complaints at present.    Old chart reviewed.  I have reviewed and agree with the initial nursing note, except as documented in my note. 82 y/o female h/o HTN, DM, asthma, CKD, gallstones, JUDAH, cholecystectomy, non-smoker p/w abdominal x yesterday, persistent, radiates to back, denies modifying factors, +cough, passing flatus, denies fever, n/v/d, anorexia, respiratory sx, change in bowel habits or urinary sx, vaginal d/c or other associated complaints at present.    Old chart reviewed.  I have reviewed and agree with the initial nursing note, except as documented in my note.

## 2021-12-27 NOTE — ED PROVIDER NOTE - NSFOLLOWUPINSTRUCTIONS_ED_ALL_ED_FT
Follow up with your doctor and urology in 3-5 days.  If you do not have a primary care doctor, CALL [phone] to find a physician to follow up with.    FOLLOW UP WITH THE UROLOGIST FOR YOUR ABNORMAL CT SCAN FINDING    Abdominal Pain    AMBULATORY CARE:    Abdominal pain can be dull, achy, or sharp. You may have pain in one area of your abdomen, or in your entire abdomen. Your pain may be caused by a condition such as constipation, food sensitivity or poisoning, infection, or a blockage. Abdominal pain can also be from a hernia, appendicitis, or an ulcer. Liver, gallbladder, or kidney conditions can also cause abdominal pain. The cause of your abdominal pain may not be known.    Abdominal Organs         Call your local emergency number ([phone] in the [place]) if:   •You have new chest pain or shortness of breath.          Seek care immediately if:   •You have pulsing pain in your upper abdomen or lower back that suddenly becomes constant.      •Your pain is in the right lower abdominal area and worsens with movement.      •You have a fever over 100.4°F (38°C) or shaking chills.      •You are vomiting and cannot keep food or liquids down.      •Your pain does not improve or gets worse over the next 8 to 12 hours.      •You see blood in your vomit or bowel movements, or they look black and tarry.      •Your skin or the whites of your eyes turn yellow.      •You are a woman and have a large amount of vaginal bleeding that is not your monthly period.      Call your doctor if:   •You have pain in your lower back.      •You are a man and have pain in your testicles.      •You have pain when you urinate.      •You have questions or concerns about your condition or care.      Treatment for abdominal pain may include any of the following:  •Prescription pain medicine may be given. Ask your healthcare provider how to take this medicine safely. Some prescription pain medicines contain acetaminophen. Do not take other medicines that contain acetaminophen without talking to your healthcare provider. Too much acetaminophen may cause liver damage. Prescription pain medicine may cause constipation. Ask your healthcare provider how to prevent or treat constipation.       •Medicines may be given to calm your stomach or prevent vomiting.      •Relaxation therapy may be used along with pain medicine.      •Surgery may be needed, depending on the cause.      Manage your symptoms:   •Apply heat on your abdomen for 20 to 30 minutes every 2 hours for as many days as directed. Heat helps decrease pain and muscle spasms.      •Make changes to the food you eat, if needed. Do not eat foods that cause abdominal pain or other symptoms. Eat small meals more often. The following changes may also help:?Eat more high-fiber foods if you are constipated. High-fiber foods include fruits, vegetables, whole-grain foods, and legumes.             ?Do not eat foods that cause gas if you have bloating. Examples include broccoli, cabbage, and cauliflower. Do not drink soda or carbonated drinks. These may also cause gas.      ?Do not eat foods or drinks that contain sorbitol or fructose if you have diarrhea and bloating. Some examples are fruit juices, candy, jelly, and sugar-free gum.      ?Do not eat high-fat foods. Examples include fried foods, cheeseburgers, hot dogs, and desserts.      ?Limit or do not have caffeine. Caffeine may make symptoms such as heartburn or nausea worse.      ?Drink more liquids to prevent dehydration from diarrhea or vomiting. Ask your healthcare provider how much liquid to drink each day and which liquids are best for you.      •Keep a diary of your abdominal pain. A diary may help your healthcare provider learn what is causing your abdominal pain. Include when the pain happens, how long it lasts, and what the pain feels like. Write down any other symptoms you have with abdominal pain. Also write down what you eat, and what symptoms you have after you eat.      •Manage your stress. Stress may cause abdominal pain. Your healthcare provider may recommend relaxation techniques and deep breathing exercises to help decrease your stress. Your healthcare provider may recommend you talk to someone about your stress or anxiety, such as a counselor or a trusted friend. Get plenty of sleep and exercise regularly.  [employer] Walking for Exercise           •Limit or do not drink alcohol. Alcohol can make your abdominal pain worse. Ask your healthcare provider if it is safe for you to drink alcohol. Also ask how much is safe for you to drink.      •Do not smoke. Nicotine and other chemicals in cigarettes can damage your esophagus and stomach. Ask your healthcare provider for information if you currently smoke and need help to quit. E-cigarettes or smokeless tobacco still contain nicotine. Talk to your healthcare provider before you use these products.      Follow up with your doctor within 24 hours or as directed: Write down your questions so you remember to ask them during your visits.

## 2021-12-27 NOTE — ED PROVIDER NOTE - PATIENT PORTAL LINK FT
You can access the FollowMyHealth Patient Portal offered by [facility] by registering at the following website: [email or web address]. By joining [employer]’s FollowMyHealth portal, you will also be able to view your health information using other applications (apps) compatible with our system.

## 2022-01-25 ENCOUNTER — RX RENEWAL (OUTPATIENT)
Age: 82
End: 2022-01-25

## 2022-03-28 ENCOUNTER — RX RENEWAL (OUTPATIENT)
Age: 82
End: 2022-03-28

## 2022-05-19 RX ORDER — BLOOD-GLUCOSE METER
KIT MISCELLANEOUS TWICE DAILY
Qty: 1 | Refills: 5 | Status: ACTIVE | COMMUNITY
Start: 2021-01-13

## 2022-05-27 NOTE — REASON FOR VISIT
[Follow-Up] : a follow-up visit Cheiloplasty (Less Than 50%) Text: A decision was made to reconstruct the defect with a  cheiloplasty.  The defect was undermined extensively.  Additional obicularis oris muscle was excised with a 15 blade scalpel.  The defect was converted into a full thickness wedge, of less than 50% of the vertical height of the lip, to facilite a better cosmetic result.  Small vessels were then tied off with 5-0 monocyrl. The obicularis oris, superficial fascia, adipose and dermis were then reapproximated.  After the deeper layers were approximated the epidermis was reapproximated with particular care given to realign the vermillion border.

## 2023-01-24 ENCOUNTER — INPATIENT (INPATIENT)
Facility: HOSPITAL | Age: 83
LOS: 2 days | Discharge: HOME HEALTH | End: 2023-01-27
Attending: INTERNAL MEDICINE | Admitting: INTERNAL MEDICINE
Payer: MEDICARE

## 2023-01-24 VITALS
DIASTOLIC BLOOD PRESSURE: 69 MMHG | RESPIRATION RATE: 15 BRPM | OXYGEN SATURATION: 98 % | HEART RATE: 56 BPM | TEMPERATURE: 96 F | SYSTOLIC BLOOD PRESSURE: 139 MMHG

## 2023-01-24 LAB
ALBUMIN SERPL ELPH-MCNC: 3.6 G/DL — SIGNIFICANT CHANGE UP (ref 3.5–5.2)
ALP SERPL-CCNC: 141 U/L — HIGH (ref 30–115)
ALT FLD-CCNC: 8 U/L — SIGNIFICANT CHANGE UP (ref 0–41)
ANION GAP SERPL CALC-SCNC: 14 MMOL/L — SIGNIFICANT CHANGE UP (ref 7–14)
APTT BLD: 24.2 SEC — LOW (ref 27–39.2)
AST SERPL-CCNC: 12 U/L — SIGNIFICANT CHANGE UP (ref 0–41)
B-OH-BUTYR SERPL-SCNC: 0.4 MMOL/L — SIGNIFICANT CHANGE UP
BASOPHILS # BLD AUTO: 0.03 K/UL — SIGNIFICANT CHANGE UP (ref 0–0.2)
BASOPHILS NFR BLD AUTO: 0.3 % — SIGNIFICANT CHANGE UP (ref 0–1)
BILIRUB SERPL-MCNC: 0.2 MG/DL — SIGNIFICANT CHANGE UP (ref 0.2–1.2)
BUN SERPL-MCNC: 48 MG/DL — HIGH (ref 10–20)
CALCIUM SERPL-MCNC: 8.2 MG/DL — LOW (ref 8.4–10.5)
CHLORIDE SERPL-SCNC: 88 MMOL/L — LOW (ref 98–110)
CO2 SERPL-SCNC: 27 MMOL/L — SIGNIFICANT CHANGE UP (ref 17–32)
CREAT SERPL-MCNC: 5.5 MG/DL — CRITICAL HIGH (ref 0.7–1.5)
EGFR: 7 ML/MIN/1.73M2 — LOW
EOSINOPHIL # BLD AUTO: 0.17 K/UL — SIGNIFICANT CHANGE UP (ref 0–0.7)
EOSINOPHIL NFR BLD AUTO: 1.9 % — SIGNIFICANT CHANGE UP (ref 0–8)
FLUAV AG NPH QL: SIGNIFICANT CHANGE UP
FLUBV AG NPH QL: SIGNIFICANT CHANGE UP
GAS PNL BLDV: SIGNIFICANT CHANGE UP
GLUCOSE SERPL-MCNC: 646 MG/DL — CRITICAL HIGH (ref 70–99)
HCT VFR BLD CALC: 31.5 % — LOW (ref 37–47)
HGB BLD-MCNC: 10.2 G/DL — LOW (ref 12–16)
IMM GRANULOCYTES NFR BLD AUTO: 0.4 % — HIGH (ref 0.1–0.3)
INR BLD: 1 RATIO — SIGNIFICANT CHANGE UP (ref 0.65–1.3)
LYMPHOCYTES # BLD AUTO: 1.25 K/UL — SIGNIFICANT CHANGE UP (ref 1.2–3.4)
LYMPHOCYTES # BLD AUTO: 13.8 % — LOW (ref 20.5–51.1)
MAGNESIUM SERPL-MCNC: 1.9 MG/DL — SIGNIFICANT CHANGE UP (ref 1.8–2.4)
MCHC RBC-ENTMCNC: 29.8 PG — SIGNIFICANT CHANGE UP (ref 27–31)
MCHC RBC-ENTMCNC: 32.4 G/DL — SIGNIFICANT CHANGE UP (ref 32–37)
MCV RBC AUTO: 92.1 FL — SIGNIFICANT CHANGE UP (ref 81–99)
MONOCYTES # BLD AUTO: 1.02 K/UL — HIGH (ref 0.1–0.6)
MONOCYTES NFR BLD AUTO: 11.2 % — HIGH (ref 1.7–9.3)
NEUTROPHILS # BLD AUTO: 6.58 K/UL — HIGH (ref 1.4–6.5)
NEUTROPHILS NFR BLD AUTO: 72.4 % — SIGNIFICANT CHANGE UP (ref 42.2–75.2)
NRBC # BLD: 0 /100 WBCS — SIGNIFICANT CHANGE UP (ref 0–0)
PLATELET # BLD AUTO: 212 K/UL — SIGNIFICANT CHANGE UP (ref 130–400)
POTASSIUM SERPL-MCNC: 4.5 MMOL/L — SIGNIFICANT CHANGE UP (ref 3.5–5)
POTASSIUM SERPL-SCNC: 4.5 MMOL/L — SIGNIFICANT CHANGE UP (ref 3.5–5)
PROT SERPL-MCNC: 6.4 G/DL — SIGNIFICANT CHANGE UP (ref 6–8)
PROTHROM AB SERPL-ACNC: 11.4 SEC — SIGNIFICANT CHANGE UP (ref 9.95–12.87)
RBC # BLD: 3.42 M/UL — LOW (ref 4.2–5.4)
RBC # FLD: 14.7 % — HIGH (ref 11.5–14.5)
RSV RNA NPH QL NAA+NON-PROBE: SIGNIFICANT CHANGE UP
SARS-COV-2 RNA SPEC QL NAA+PROBE: SIGNIFICANT CHANGE UP
SODIUM SERPL-SCNC: 129 MMOL/L — LOW (ref 135–146)
TROPONIN T SERPL-MCNC: 0.02 NG/ML — HIGH
WBC # BLD: 9.09 K/UL — SIGNIFICANT CHANGE UP (ref 4.8–10.8)
WBC # FLD AUTO: 9.09 K/UL — SIGNIFICANT CHANGE UP (ref 4.8–10.8)

## 2023-01-24 PROCEDURE — 70450 CT HEAD/BRAIN W/O DYE: CPT | Mod: 26,MA

## 2023-01-24 PROCEDURE — 71045 X-RAY EXAM CHEST 1 VIEW: CPT | Mod: 26

## 2023-01-24 PROCEDURE — 99285 EMERGENCY DEPT VISIT HI MDM: CPT

## 2023-01-24 RX ORDER — SODIUM CHLORIDE 9 MG/ML
1000 INJECTION, SOLUTION INTRAVENOUS ONCE
Refills: 0 | Status: COMPLETED | OUTPATIENT
Start: 2023-01-24 | End: 2023-01-24

## 2023-01-24 NOTE — ED ADULT TRIAGE NOTE - CHIEF COMPLAINT QUOTE
Pt BIBA from home c/o persistent hyperglycemia x 2 days despite Insulin administration, lethargy and generalized weakness. Triage XW=[id number].

## 2023-01-25 LAB
APPEARANCE UR: ABNORMAL
BACTERIA # UR AUTO: ABNORMAL
BILIRUB UR-MCNC: NEGATIVE — SIGNIFICANT CHANGE UP
COLOR SPEC: SIGNIFICANT CHANGE UP
DIFF PNL FLD: ABNORMAL
EPI CELLS # UR: 2 /HPF — SIGNIFICANT CHANGE UP (ref 0–5)
GLUCOSE BLDC GLUCOMTR-MCNC: 101 MG/DL — HIGH (ref 70–99)
GLUCOSE BLDC GLUCOMTR-MCNC: 248 MG/DL — HIGH (ref 70–99)
GLUCOSE BLDC GLUCOMTR-MCNC: 333 MG/DL — HIGH (ref 70–99)
GLUCOSE BLDC GLUCOMTR-MCNC: 465 MG/DL — CRITICAL HIGH (ref 70–99)
GLUCOSE BLDC GLUCOMTR-MCNC: 475 MG/DL — CRITICAL HIGH (ref 70–99)
GLUCOSE BLDC GLUCOMTR-MCNC: 528 MG/DL — CRITICAL HIGH (ref 70–99)
GLUCOSE UR QL: ABNORMAL
HYALINE CASTS # UR AUTO: 0 /LPF — SIGNIFICANT CHANGE UP (ref 0–7)
KETONES UR-MCNC: NEGATIVE — SIGNIFICANT CHANGE UP
LEUKOCYTE ESTERASE UR-ACNC: ABNORMAL
NITRITE UR-MCNC: NEGATIVE — SIGNIFICANT CHANGE UP
PH UR: 7.5 — SIGNIFICANT CHANGE UP (ref 5–8)
PROT UR-MCNC: ABNORMAL
RBC CASTS # UR COMP ASSIST: 10 /HPF — HIGH (ref 0–4)
SP GR SPEC: 1.01 — SIGNIFICANT CHANGE UP (ref 1.01–1.03)
TROPONIN T SERPL-MCNC: <0.01 NG/ML — SIGNIFICANT CHANGE UP
UROBILINOGEN FLD QL: SIGNIFICANT CHANGE UP
WBC UR QL: 250 /HPF — HIGH (ref 0–5)

## 2023-01-25 PROCEDURE — 93010 ELECTROCARDIOGRAM REPORT: CPT

## 2023-01-25 PROCEDURE — 99223 1ST HOSP IP/OBS HIGH 75: CPT

## 2023-01-25 RX ORDER — LANOLIN ALCOHOL/MO/W.PET/CERES
1 CREAM (GRAM) TOPICAL
Qty: 0 | Refills: 0 | DISCHARGE

## 2023-01-25 RX ORDER — ALBUTEROL 90 UG/1
1 AEROSOL, METERED ORAL EVERY 4 HOURS
Refills: 0 | Status: DISCONTINUED | OUTPATIENT
Start: 2023-01-25 | End: 2023-01-27

## 2023-01-25 RX ORDER — AMLODIPINE BESYLATE 2.5 MG/1
1 TABLET ORAL
Qty: 0 | Refills: 0 | DISCHARGE

## 2023-01-25 RX ORDER — LATANOPROST 0.05 MG/ML
0 SOLUTION/ DROPS OPHTHALMIC; TOPICAL
Qty: 0 | Refills: 0 | DISCHARGE

## 2023-01-25 RX ORDER — INSULIN HUMAN 100 [IU]/ML
8 INJECTION, SOLUTION SUBCUTANEOUS ONCE
Refills: 0 | Status: COMPLETED | OUTPATIENT
Start: 2023-01-25 | End: 2023-01-25

## 2023-01-25 RX ORDER — INSULIN HUMAN 100 [IU]/ML
6 INJECTION, SOLUTION SUBCUTANEOUS ONCE
Refills: 0 | Status: COMPLETED | OUTPATIENT
Start: 2023-01-25 | End: 2023-01-25

## 2023-01-25 RX ORDER — INSULIN LISPRO 100/ML
VIAL (ML) SUBCUTANEOUS
Refills: 0 | Status: DISCONTINUED | OUTPATIENT
Start: 2023-01-25 | End: 2023-01-27

## 2023-01-25 RX ORDER — ACETAMINOPHEN 500 MG
650 TABLET ORAL EVERY 6 HOURS
Refills: 0 | Status: DISCONTINUED | OUTPATIENT
Start: 2023-01-25 | End: 2023-01-27

## 2023-01-25 RX ORDER — GABAPENTIN 400 MG/1
100 CAPSULE ORAL EVERY 8 HOURS
Refills: 0 | Status: DISCONTINUED | OUTPATIENT
Start: 2023-01-25 | End: 2023-01-27

## 2023-01-25 RX ORDER — LATANOPROST 0.05 MG/ML
1 SOLUTION/ DROPS OPHTHALMIC; TOPICAL AT BEDTIME
Refills: 0 | Status: DISCONTINUED | OUTPATIENT
Start: 2023-01-25 | End: 2023-01-27

## 2023-01-25 RX ORDER — VALSARTAN 80 MG/1
160 TABLET ORAL DAILY
Refills: 0 | Status: DISCONTINUED | OUTPATIENT
Start: 2023-01-25 | End: 2023-01-27

## 2023-01-25 RX ORDER — LANOLIN ALCOHOL/MO/W.PET/CERES
3 CREAM (GRAM) TOPICAL AT BEDTIME
Refills: 0 | Status: DISCONTINUED | OUTPATIENT
Start: 2023-01-25 | End: 2023-01-27

## 2023-01-25 RX ORDER — ATORVASTATIN CALCIUM 80 MG/1
40 TABLET, FILM COATED ORAL AT BEDTIME
Refills: 0 | Status: DISCONTINUED | OUTPATIENT
Start: 2023-01-25 | End: 2023-01-27

## 2023-01-25 RX ORDER — INSULIN GLARGINE 100 [IU]/ML
45 INJECTION, SOLUTION SUBCUTANEOUS
Qty: 0 | Refills: 0 | DISCHARGE

## 2023-01-25 RX ORDER — BRIMONIDINE TARTRATE 2 MG/MG
1 SOLUTION/ DROPS OPHTHALMIC EVERY 12 HOURS
Refills: 0 | Status: DISCONTINUED | OUTPATIENT
Start: 2023-01-25 | End: 2023-01-27

## 2023-01-25 RX ORDER — AMLODIPINE BESYLATE 2.5 MG/1
5 TABLET ORAL DAILY
Refills: 0 | Status: DISCONTINUED | OUTPATIENT
Start: 2023-01-25 | End: 2023-01-27

## 2023-01-25 RX ORDER — INSULIN LISPRO 100/ML
10 VIAL (ML) SUBCUTANEOUS ONCE
Refills: 0 | Status: COMPLETED | OUTPATIENT
Start: 2023-01-25 | End: 2023-01-25

## 2023-01-25 RX ORDER — INSULIN GLARGINE 100 [IU]/ML
24 INJECTION, SOLUTION SUBCUTANEOUS AT BEDTIME
Refills: 0 | Status: DISCONTINUED | OUTPATIENT
Start: 2023-01-25 | End: 2023-01-27

## 2023-01-25 RX ORDER — INSULIN LISPRO 100/ML
8 VIAL (ML) SUBCUTANEOUS
Refills: 0 | Status: DISCONTINUED | OUTPATIENT
Start: 2023-01-25 | End: 2023-01-27

## 2023-01-25 RX ORDER — GABAPENTIN 400 MG/1
600 CAPSULE ORAL
Qty: 0 | Refills: 0 | DISCHARGE

## 2023-01-25 RX ORDER — HEPARIN SODIUM 5000 [USP'U]/ML
5000 INJECTION INTRAVENOUS; SUBCUTANEOUS EVERY 12 HOURS
Refills: 0 | Status: DISCONTINUED | OUTPATIENT
Start: 2023-01-25 | End: 2023-01-27

## 2023-01-25 RX ORDER — LIRAGLUTIDE 6 MG/ML
1 INJECTION SUBCUTANEOUS
Qty: 0 | Refills: 0 | DISCHARGE

## 2023-01-25 RX ORDER — BUDESONIDE AND FORMOTEROL FUMARATE DIHYDRATE 160; 4.5 UG/1; UG/1
2 AEROSOL RESPIRATORY (INHALATION)
Refills: 0 | Status: DISCONTINUED | OUTPATIENT
Start: 2023-01-25 | End: 2023-01-27

## 2023-01-25 RX ORDER — AMLODIPINE AND VALSARTAN 5; 320 MG/1; MG/1
0 TABLET, FILM COATED ORAL
Qty: 0 | Refills: 0 | DISCHARGE

## 2023-01-25 RX ORDER — PIOGLITAZONE HYDROCHLORIDE 15 MG/1
1 TABLET ORAL
Qty: 0 | Refills: 0 | DISCHARGE

## 2023-01-25 RX ORDER — ASPIRIN/CALCIUM CARB/MAGNESIUM 324 MG
81 TABLET ORAL DAILY
Refills: 0 | Status: DISCONTINUED | OUTPATIENT
Start: 2023-01-25 | End: 2023-01-27

## 2023-01-25 RX ORDER — BRIMONIDINE TARTRATE 2 MG/MG
0 SOLUTION/ DROPS OPHTHALMIC
Qty: 0 | Refills: 0 | DISCHARGE

## 2023-01-25 RX ADMIN — Medication 81 MILLIGRAM(S): at 12:03

## 2023-01-25 RX ADMIN — VALSARTAN 160 MILLIGRAM(S): 80 TABLET ORAL at 11:09

## 2023-01-25 RX ADMIN — AMLODIPINE BESYLATE 5 MILLIGRAM(S): 2.5 TABLET ORAL at 11:09

## 2023-01-25 RX ADMIN — Medication 8 UNIT(S): at 17:36

## 2023-01-25 RX ADMIN — ATORVASTATIN CALCIUM 40 MILLIGRAM(S): 80 TABLET, FILM COATED ORAL at 23:47

## 2023-01-25 RX ADMIN — BUDESONIDE AND FORMOTEROL FUMARATE DIHYDRATE 2 PUFF(S): 160; 4.5 AEROSOL RESPIRATORY (INHALATION) at 23:47

## 2023-01-25 RX ADMIN — INSULIN GLARGINE 24 UNIT(S): 100 INJECTION, SOLUTION SUBCUTANEOUS at 23:49

## 2023-01-25 RX ADMIN — Medication 10 UNIT(S): at 08:04

## 2023-01-25 RX ADMIN — INSULIN HUMAN 6 UNIT(S): 100 INJECTION, SOLUTION SUBCUTANEOUS at 00:20

## 2023-01-25 RX ADMIN — BRIMONIDINE TARTRATE 1 DROP(S): 2 SOLUTION/ DROPS OPHTHALMIC at 17:36

## 2023-01-25 RX ADMIN — Medication 8 UNIT(S): at 11:58

## 2023-01-25 RX ADMIN — SODIUM CHLORIDE 1000 MILLILITER(S): 9 INJECTION, SOLUTION INTRAVENOUS at 00:17

## 2023-01-25 RX ADMIN — HEPARIN SODIUM 5000 UNIT(S): 5000 INJECTION INTRAVENOUS; SUBCUTANEOUS at 17:36

## 2023-01-25 RX ADMIN — GABAPENTIN 100 MILLIGRAM(S): 400 CAPSULE ORAL at 23:47

## 2023-01-25 RX ADMIN — LATANOPROST 1 DROP(S): 0.05 SOLUTION/ DROPS OPHTHALMIC; TOPICAL at 23:47

## 2023-01-25 RX ADMIN — Medication 4: at 11:58

## 2023-01-25 RX ADMIN — INSULIN HUMAN 8 UNIT(S): 100 INJECTION, SOLUTION SUBCUTANEOUS at 09:55

## 2023-01-25 NOTE — H&P ADULT - NSICDXPASTMEDICALHX_GEN_ALL_CORE_FT
PAST MEDICAL HISTORY:  Asthma, unspecified asthma severity, unspecified whether complicated, unspecified whether persistent     Hypertension affecting pregnancy, antepartum     Type 2 diabetes mellitus without complication, with long-term current use of insulin

## 2023-01-25 NOTE — H&P ADULT - NSHPPHYSICALEXAM_GEN_ALL_CORE
Vital Signs Last 24 Hrs  T(C): 36.2 (25 Jan 2023 06:48), Max: 36.2 (25 Jan 2023 06:48)  T(F): 97.1 (25 Jan 2023 06:48), Max: 97.1 (25 Jan 2023 06:48)  HR: 100 (25 Jan 2023 06:48) (56 - 100)  BP: 166/74 (25 Jan 2023 06:48) (139/69 - 166/74)  BP(mean): --  RR: 15 (24 Jan 2023 21:49) (15 - 15)  SpO2: 100% (25 Jan 2023 09:00) (70% - 100%)    Parameters below as of 24 Jan 2023 21:49  Patient On (Oxygen Delivery Method): room air    GENERAL: NAD, lying in bed comfortably  HEAD:  Atraumatic, Normocephalic  EYES: EOMI, conjunctiva and sclera clear  ENT: Moist mucous membranes  NECK: Supple, No JVD  CHEST/LUNG: Clear to auscultation bilaterally; Unlabored respirations  HEART: Regular rate and rhythm; No murmurs, rubs, or gallops  ABDOMEN: Bowel sounds present; Soft, Nontender, Nondistended.   EXTREMITIES: No clubbing, cyanosis, or edema  NERVOUS SYSTEM:  Alert & Oriented X3, speech clear. No deficits   SKIN: No rashes or lesions

## 2023-01-25 NOTE — PROGRESS NOTE ADULT - ASSESSMENT
# ESRD on HD via AVF MWF  HD today, 3 hours, 2 k bath, UF 2 kg,  resume binders  low Na/k diet    #DM and Hyperglycemia likely 2/2 noncompliance     on insulin  endo f/u for new regimen     #Dizziness and weakness likely 2/2 hyperglycemia      #HTN - cont amlodipine 5, valsartan 160     #HLD - cont atorvastatin 40, ezetimibe 10 non formulary - ok to hold for now     #post herpetic neuralgia - cont gabapentin 100 tid      # ESRD on HD via AVF MWF  HD today, 3 hours, 2 k bath, UF 2 kg,  resume binders  low Na/k diet    #DM and Hyperglycemia likely 2/2 noncompliance     on insulin  endo f/u for new regimen     #Dizziness and weakness likely 2/2 hyperglycemia      #HTN - cont amlodipine 5, valsartan 160     #HLD - cont atorvastatin 40, ezetimibe 10 non formulary - ok to hold for now     #post herpetic neuralgia - cont gabapentin 100 tid         HD note:    pt seen in HD, /80, tolerated well, continue R, f/u BP,  d/w HD nurse

## 2023-01-25 NOTE — ED PROVIDER NOTE - ATTENDING APP SHARED VISIT CONTRIBUTION OF CARE
82-year-old female with past medical history of HTN, DM, asthma, ESRD on HD MWF, and asthma Presenting today with generalized weakness, polyuria polydipsia and intermittent episodes of confusion for the last 3 to 4 days.  Patient is present with her son.  Patient is oriented x3 bedside is able to answer all questions and is providing history.  Denies any significant chest pain at this time, does endorse intermittent episodes of chest pain over the last couple days.  Denies any fevers vomiting or diarrhea.     CONSTITUTIONAL: Well-developed; well-nourished; in no acute distress.   SKIN: warm, dry  HEAD: Normocephalic; atraumatic.  EYES: PERRL, EOMI, no conjunctival erythema  ENT: No nasal discharge; airway clear.  NECK: Supple; non tender.  CARD: S1, S2 normal; no murmurs, gallops, or rubs. Regular rate and rhythm.   RESP: No wheezes, rales or rhonchi.  ABD: soft ntnd  EXT: Normal ROM.  No clubbing, cyanosis or edema.   NEURO: Alert, oriented, grossly unremarkable  PSYCH: Cooperative, appropriate.

## 2023-01-25 NOTE — H&P ADULT - ASSESSMENT
82 y/o female with PMH of HTN, DM, asthma, ESRD on HD MWF, JUDAH, cholecystectomy, non-smoker presents to ED for high sugars associated with polyuria.     #Hyperglycemia likely 2/2 noncompliance   #h/o DM   - ran out of pioglitazone last week, does not know how much insulin she uses   - DKA ruled out: AG 14, BetaOH 0.4   - s/p IVF and IV insulin in ED   - start insulin basal bolus ISS     #Dizziness and weakness likely 2/2 dehydration   - s/p IVF in ED with improved symptoms   - CT head neg   - check orthostatics   - PT consult     #Asymptomatic Pyuria   - UA pos large LE, , few bacteria  - monitor off abx     #ESRD on HD MWF   - nephro consult for HD today   - cont calcium acetate 667 tid -> f/u phos level   - avoid nephrotoxic agents     #HTN - cont amlodipine 5, valsartan 160   #HLD - cont atorvastatin 40, ezetimibe 10 non formulary - ok to hold for now   #Glaucoma - cont eye drops   #post herpetic neuralgia - cont gabapentin 100 tid     #Misc   Diet Renal/CC  GI ppx none  DVT ppx heparin sq   Activity IAT

## 2023-01-25 NOTE — ED PROVIDER NOTE - PHYSICAL EXAMINATION
VITAL SIGNS: I have reviewed nursing notes and confirm.  CONSTITUTIONAL: 83 yo F laying on stretcher; in no acute distress.  SKIN: Skin exam is warm and dry, no acute rash.  HEAD: Normocephalic; atraumatic.  EYES: PERRL, EOM intact; conjunctiva and sclera clear.  ENT: No nasal discharge; airway clear.   CARD: S1, S2 normal; no murmurs, gallops, or rubs. Regular rate and rhythm.  RESP: No wheezes, rales or rhonchi. Speaking in full sentences.   ABD: Normal bowel sounds; soft; non-distended; non-tender; No rebound or guarding. No CVA tenderness.  EXT: Normal ROM. No clubbing, cyanosis or edema. No calf TTP or swelling.   NEURO: Alert, oriented. Grossly unremarkable. No focal deficits.

## 2023-01-25 NOTE — PROGRESS NOTE ADULT - SUBJECTIVE AND OBJECTIVE BOX
[facility]  INITIAL CONSULT NOTE  --------------------------------------------------------------------------------  HPI:    82 y/o female with PMH of HTN, DM, asthma, ESRD on HD MWF, JUDAH, cholecystectomy, non-smoker presents to ED for high sugars associated with polyuria. Patient ran out of her pioglitazone a week ago and is not compliant with her insulin. Patient also complaining of dizziness and weakness not being able to stand. Patient denies any fevers, chills, headaches, chest pain, palpitations, sob, cough, abd pain, dysuria, le swelling.     In ED vitals: T 97.1, , /74, RR 15, Spo2 100% on RA   CT head unremarkable, CXR negative. Labs significant for glucose 646, Cr 5.5, BUN 48, trop 0.02, BetaOH 0.4, UA pos large LE, , few bacteria.   Patient given IVF and insulin in the ED  (25 Jan 2023 10:59)      PAST HISTORY  --------------------------------------------------------------------------------  PAST MEDICAL & SURGICAL HISTORY:  Asthma, unspecified asthma severity, unspecified whether complicated, unspecified whether persistent      Hypertension affecting pregnancy, antepartum      Type 2 diabetes mellitus without complication, with long-term current use of insulin      No significant past surgical history        FAMILY HISTORY:    PAST SOCIAL HISTORY:    ALLERGIES & MEDICATIONS  --------------------------------------------------------------------------------  Allergies    No Known Allergies    Intolerances      Standing Inpatient Medications  amLODIPine   Tablet 5 milliGRAM(s) Oral daily  aspirin  chewable 81 milliGRAM(s) Oral daily  atorvastatin 40 milliGRAM(s) Oral at bedtime  brimonidine 0.2% Ophthalmic Solution 1 Drop(s) Both EYES every 12 hours  budesonide 160 MICROgram(s)/formoterol 4.5 MICROgram(s) Inhaler 2 Puff(s) Inhalation two times a day  gabapentin 100 milliGRAM(s) Oral every 8 hours  heparin   Injectable 5000 Unit(s) SubCutaneous every 12 hours  insulin glargine Injectable (LANTUS) 24 Unit(s) SubCutaneous at bedtime  insulin lispro (ADMELOG) corrective regimen sliding scale   SubCutaneous three times a day before meals  insulin lispro Injectable (ADMELOG) 8 Unit(s) SubCutaneous three times a day before meals  latanoprost 0.005% Ophthalmic Solution 1 Drop(s) Both EYES at bedtime  valsartan 160 milliGRAM(s) Oral daily    PRN Inpatient Medications  acetaminophen     Tablet .. 650 milliGRAM(s) Oral every 6 hours PRN  albuterol    90 MICROgram(s) HFA Inhaler 1 Puff(s) Inhalation every 4 hours PRN  melatonin 3 milliGRAM(s) Oral at bedtime PRN      REVIEW OF SYSTEMS  --------------------------------------------------------------------------------    Skin: No rashes  Respiratory: No dyspnea, cough, wheezing, hemoptysis  CV: No chest pain, PND, orthopnea  GI: No abdominal pain, diarrhea, constipation, nausea, vomiting, melena, hematochezia  Neuro:  weakness,  Heme: No easy bruising or bleeding      All other systems were reviewed and are negative, except as noted.    VITALS/PHYSICAL EXAM  --------------------------------------------------------------------------------  T(C): 36.2 (01-25-23 @ 06:48), Max: 36.2 (01-25-23 @ 06:48)  HR: 100 (01-25-23 @ 06:48) (56 - 100)  BP: 166/74 (01-25-23 @ 06:48) (139/69 - 166/74)  RR: 15 (01-24-23 @ 21:49) (15 - 15)  SpO2: 100% (01-25-23 @ 09:00) (70% - 100%)  Wt(kg): --    Weight (kg): 100 (01-25-23 @ 11:25)      Physical Exam:  	Gen: no distress  	HEENT: PERRL, supple neck,  	Pulm: CTA B/L  	CV: S1S2; no rub  	Abd: +BS, soft, nontender/nondistended  	LE:  no edema  	Neuro: No focal deficits,   	    LABS/STUDIES  --------------------------------------------------------------------------------              10.2   9.09  >-----------<  212      [01-24-23 @ 22:35]              31.5     129  |  88  |  48  ----------------------------<  646      [01-24-23 @ 22:35]  4.5   |  27  |  5.5        Ca     8.2     [01-24-23 @ 22:35]      Mg     1.9     [01-24-23 @ 22:35]    TPro  6.4  /  Alb  3.6  /  TBili  0.2  /  DBili  x   /  AST  12  /  ALT  8   /  AlkPhos  141  [01-24-23 @ 22:35]    PT/INR: PT 11.40, INR 1.00       [01-24-23 @ 22:35]  PTT: 24.2       [01-24-23 @ 22:35]    Troponin <0.01      [01-25-23 @ 03:11]    Creatinine Trend:  SCr 5.5 [01-24 @ 22:35]    Urinalysis - [01-25-23 @ 03:00]      Color Light Yellow / Appearance Slightly Turbid / SG 1.010 / pH 7.5      Gluc >= 1000 mg/dL / Ketone Negative  / Bili Negative / Urobili <2 mg/dL       Blood Small / Protein 100 mg/dL / Leuk Est Large / Nitrite Negative      RBC 10 /  / Hyaline 0 / Gran  / Sq Epi  / Non Sq Epi 2 / Bacteria Few      HbA1c 8.3      [04-13-18 @ 06:02]      EMIR: titer 1:80, pattern Homogeneous      [04-12-18 @ 16:22]  dsDNA <12      [04-12-18 @ 16:22]  SPEP Interpretation: Normal Electrophoresis Pattern      [04-12-18 @ 20:04]  Immunofixation Urine: No Monoclonal Band Identified      [04-13-18 @ 02:13]  UPEP Interpretation: Mild Selective (Glomerular) Proteinuria      [04-13-18 @ 02:13]

## 2023-01-25 NOTE — H&P ADULT - HISTORY OF PRESENT ILLNESS
80 y/o female with PMH of HTN, DM, asthma, ESRD on HD MWF, JUDAH, cholecystectomy, non-smoker presents to ED for high sugars associated with polyuria. Patient ran out of her pioglitazone a week ago and is not compliant with her insulin. Patient also complaining of dizziness and weakness not being able to stand. Patient denies any fevers, chills, headaches, chest pain, palpitations, sob, cough, abd pain, dysuria, le swelling.     In ED vitals: T 97.1, , /74, RR 15, Spo2 100% on RA   CT head unremarkable, CXR negative. Labs significant for glucose 646, Cr 5.5, BUN 48, trop 0.02, BetaOH 0.4, UA pos large LE, , few bacteria.   Patient given IVF and insulin in the ED

## 2023-01-25 NOTE — ED ADULT NURSE REASSESSMENT NOTE - NS ED NURSE REASSESS COMMENT FT1
MD Lund notified of pt's FS via teams. pt is awake, A&Ox3, denies pain. cardiac monitoring in progress.

## 2023-01-25 NOTE — H&P ADULT - NSHPLABSRESULTS_GEN_ALL_CORE
10.2   9.09  )-----------( 212      ( 2023 22:35 )             31.5           129<L>  |  88<L>  |  48<H>  ----------------------------<  646<HH>  4.5   |  27  |  5.5<HH>    Ca    8.2<L>      2023 22:35  Mg     1.9         TPro  6.4  /  Alb  3.6  /  TBili  0.2  /  DBili  x   /  AST  12  /  ALT  8   /  AlkPhos  141<H>                Urinalysis Basic - ( 2023 03:00 )    Color: Light Yellow / Appearance: Slightly Turbid / S.010 / pH: x  Gluc: x / Ketone: Negative  / Bili: Negative / Urobili: <2 mg/dL   Blood: x / Protein: 100 mg/dL / Nitrite: Negative   Leuk Esterase: Large / RBC: 10 /HPF /  /HPF   Sq Epi: x / Non Sq Epi: 2 /HPF / Bacteria: Few        PT/INR - ( 2023 22:35 )   PT: 11.40 sec;   INR: 1.00 ratio         PTT - ( 2023 22:35 )  PTT:24.2 sec    Lactate Trend      CARDIAC MARKERS ( 2023 03:11 )  x     / <0.01 ng/mL / x     / x     / x      CARDIAC MARKERS ( 2023 22:35 )  x     / 0.02 ng/mL / x     / x     / x            CAPILLARY BLOOD GLUCOSE      POCT Blood Glucose.: 248 mg/dL (2023 11:05)

## 2023-01-25 NOTE — ED PROVIDER NOTE - NS ED ATTENDING STATEMENT MOD
This was a shared visit with the LAMIN. I reviewed and verified the documentation and independently performed the documented:

## 2023-01-25 NOTE — ED PROVIDER NOTE - OBJECTIVE STATEMENT
82-year-old female with past medical history of HTN, DM, asthma, ESRD on HD MWF, and asthma presents to the ED for evaluation of persistently elevated blood sugar, polyuria, generalized weakness, lightheadedness, and mild chest tightness that has been ongoing over the last 3 days.  Today patient felt so weak that she was unable to stand.  Patient states she is compliant with her medication.  She recently moved back here from [place] and is scheduled to follow-up with her new nephrologist this week.  She has not missed any dialysis sessions.  She denies other complaints. Pt denies fever, chills, nausea, vomiting, abdominal pain, diarrhea, headache, SOB, back pain, LOC, trauma, cough, calf pain/swelling.

## 2023-01-25 NOTE — ED PROVIDER NOTE - CLINICAL SUMMARY MEDICAL DECISION MAKING FREE TEXT BOX
82-year-old female with past medical history of HTN, DM, asthma, ESRD on HD MWF, and asthma Presenting today with generalized weakness, polyuria polydipsia and intermittent episodes of confusion for the last 3 to 4 days. Vital stable, patient's exam is unremarkable.  Oriented x4 bedside moving all 4 extremities.  No focal deficits.  Labs reviewed noted for hyperglycemia however no evidence of DKA.  Patient is end-stage renal and therefore has elevated creatinine.  Troponin mildly elevated 2.02, EKG with no STEMI.  Patient denies chest pain at this time.  CT head performed given history of confusion, no acute pathology noted.  Patient given fluids as well as insulin with improvement in fingerstick.  Admitted for further management.

## 2023-01-25 NOTE — H&P ADULT - ATTENDING COMMENTS
Pt seen and examined in the ED this morning. Previous notes reviewed by me. History confirmed with the pt and as above. She felt better once FS improved after IVF and insulin. c/o pain from prior shingles. NO other complaints including urinary complaints. Last HD was Monday.  States she is compliant with insulin and that she ran out of oral med. ROS negative    T(F): 97.1 (01-25-23 @ 06:48), Max: 97.1 (01-25-23 @ 06:48)  HR: 77 (01-25-23 @ 16:30) (56 - 100)  BP: 178/79 (01-25-23 @ 16:30) (139/69 - 178/79)  RR: 15 (01-24-23 @ 21:49) (15 - 15)  SpO2: 100% (01-25-23 @ 09:00) (70% - 100%) on RA    CAPILLARY BLOOD GLUCOSE    POCT Blood Glucose.: 248 mg/dL (25 Jan 2023 11:05)  POCT Blood Glucose.: 465 mg/dL (25 Jan 2023 08:47)  POCT Blood Glucose.: 528 mg/dL (25 Jan 2023 08:45)  POCT Blood Glucose.: 475 mg/dL (25 Jan 2023 07:59)  POCT Blood Glucose.: 507 mg/dL (25 Jan 2023 02:40)  POCT Blood Glucose.: 538 mg/dL (24 Jan 2023 21:48)    PHYSICAL EXAM:  GENERAL: NAD  HEAD:  Normocephalic  EYES:  conjunctiva and sclera clear  ENMT: Moist mucous membranes  NERVOUS SYSTEM:  Alert, awake, Good concentration, moving extremities  CHEST/LUNG: CTA b/l  HEART: Regular rate and rhythm  ABDOMEN: Soft, Nontender, Nondistended; Bowel sounds present  EXTREMITIES:   No edema LE, right arm with AV fistula with thrill  SKIN: warm, dry    Consultant(s) Notes Reviewed:  [x ] YES  [ ] NO  Care Discussed with Consultants/Other Providers [ x] YES  [ ] NO    LABS:                        10.2   9.09  )-----------( 212      ( 24 Jan 2023 22:35 )             31.5     01-24    129<L>  |  88<L>  |  48<H>  ----------------------------<  646<HH>  4.5   |  27  |  5.5<HH>    Ca    8.2<L>      24 Jan 2023 22:35  Mg     1.9     01-24  TPro  6.4  /  Alb  3.6  /  TBili  0.2  /  DBili  x   /  AST  12  /  ALT  8   /  AlkPhos  141<H>  01-24  PT/INR - ( 24 Jan 2023 22:35 )   PT: 11.40 sec;   INR: 1.00 ratio  PTT - ( 24 Jan 2023 22:35 )  PTT:24.2 sec    RADIOLOGY & ADDITIONAL TESTS:  Imaging and report Personally Reviewed:  [x] YES  [ ] NO  CXR reviewed and interpreted by me - official report: NAPD  EKG reviewed and interpreted by me: NSR at 64, LVH, LAHB, RBBB    82 y/o woman with PMH of HTN, DM on insulin, asthma, ESRD on HD M/W/F and PSH of JUDAH and cholecystectomy presented to the ED for high sugars associated with polyuria.     1. DM with Hyperglycemia - no DKA  s/p insulin IV and now on insulin SQ (lantus and lispro)  check A1C and monitor FS  - repeat BMP  carb consistent renal diet  pt used to see Dr. Mendez in the past -> please have pt f/u with him on discharge   stress importance of medication compliance    2. ESRD on HD (M/W/F) - s/p HD today with 2L removed  renal consult appreciated  check Phos level    3. HTN - on valsartan and amlodipine  4. Asthma - stable- on symbicort and albuterol prn  5. Hyponatremia - should improve with better DM control and fluid removal with HD  6. +UA but pt is asymptomatic - agree with no abx for now  7. Continue current management for hyperlipidemia and postherpetic neuralgia  8. DVT prophylaxis    full code  high risk pt

## 2023-01-25 NOTE — ED PROVIDER NOTE - CARE PLAN
Principal Discharge DX:	Generalized weakness  Secondary Diagnosis:	Lightheadedness  Secondary Diagnosis:	Chest tightness  Secondary Diagnosis:	Hyperglycemia  Secondary Diagnosis:	ESRD on dialysis   1

## 2023-01-26 LAB
A1C WITH ESTIMATED AVERAGE GLUCOSE RESULT: 11.7 % — HIGH (ref 4–5.6)
ALBUMIN SERPL ELPH-MCNC: 3.5 G/DL — SIGNIFICANT CHANGE UP (ref 3.5–5.2)
ALP SERPL-CCNC: 139 U/L — HIGH (ref 30–115)
ALT FLD-CCNC: 8 U/L — SIGNIFICANT CHANGE UP (ref 0–41)
ANION GAP SERPL CALC-SCNC: 13 MMOL/L — SIGNIFICANT CHANGE UP (ref 7–14)
AST SERPL-CCNC: 13 U/L — SIGNIFICANT CHANGE UP (ref 0–41)
BASOPHILS # BLD AUTO: 0.03 K/UL — SIGNIFICANT CHANGE UP (ref 0–0.2)
BASOPHILS NFR BLD AUTO: 0.5 % — SIGNIFICANT CHANGE UP (ref 0–1)
BILIRUB SERPL-MCNC: 0.3 MG/DL — SIGNIFICANT CHANGE UP (ref 0.2–1.2)
BUN SERPL-MCNC: 44 MG/DL — HIGH (ref 10–20)
CALCIUM SERPL-MCNC: 8.5 MG/DL — SIGNIFICANT CHANGE UP (ref 8.4–10.5)
CHLORIDE SERPL-SCNC: 90 MMOL/L — LOW (ref 98–110)
CO2 SERPL-SCNC: 29 MMOL/L — SIGNIFICANT CHANGE UP (ref 17–32)
CREAT SERPL-MCNC: 5.5 MG/DL — CRITICAL HIGH (ref 0.7–1.5)
EGFR: 7 ML/MIN/1.73M2 — LOW
EOSINOPHIL # BLD AUTO: 0.21 K/UL — SIGNIFICANT CHANGE UP (ref 0–0.7)
EOSINOPHIL NFR BLD AUTO: 3.2 % — SIGNIFICANT CHANGE UP (ref 0–8)
ESTIMATED AVERAGE GLUCOSE: 289 MG/DL — HIGH (ref 68–114)
GLUCOSE BLDC GLUCOMTR-MCNC: 134 MG/DL — HIGH (ref 70–99)
GLUCOSE BLDC GLUCOMTR-MCNC: 321 MG/DL — HIGH (ref 70–99)
GLUCOSE BLDC GLUCOMTR-MCNC: 430 MG/DL — HIGH (ref 70–99)
GLUCOSE SERPL-MCNC: 411 MG/DL — HIGH (ref 70–99)
HCT VFR BLD CALC: 31.6 % — LOW (ref 37–47)
HGB BLD-MCNC: 10.3 G/DL — LOW (ref 12–16)
IMM GRANULOCYTES NFR BLD AUTO: 0.3 % — SIGNIFICANT CHANGE UP (ref 0.1–0.3)
LYMPHOCYTES # BLD AUTO: 1.64 K/UL — SIGNIFICANT CHANGE UP (ref 1.2–3.4)
LYMPHOCYTES # BLD AUTO: 24.7 % — SIGNIFICANT CHANGE UP (ref 20.5–51.1)
MAGNESIUM SERPL-MCNC: 1.8 MG/DL — SIGNIFICANT CHANGE UP (ref 1.8–2.4)
MCHC RBC-ENTMCNC: 29.4 PG — SIGNIFICANT CHANGE UP (ref 27–31)
MCHC RBC-ENTMCNC: 32.6 G/DL — SIGNIFICANT CHANGE UP (ref 32–37)
MCV RBC AUTO: 90.3 FL — SIGNIFICANT CHANGE UP (ref 81–99)
MONOCYTES # BLD AUTO: 0.83 K/UL — HIGH (ref 0.1–0.6)
MONOCYTES NFR BLD AUTO: 12.5 % — HIGH (ref 1.7–9.3)
NEUTROPHILS # BLD AUTO: 3.91 K/UL — SIGNIFICANT CHANGE UP (ref 1.4–6.5)
NEUTROPHILS NFR BLD AUTO: 58.8 % — SIGNIFICANT CHANGE UP (ref 42.2–75.2)
NRBC # BLD: 0 /100 WBCS — SIGNIFICANT CHANGE UP (ref 0–0)
PHOSPHATE SERPL-MCNC: 3.1 MG/DL — SIGNIFICANT CHANGE UP (ref 2.1–4.9)
PLATELET # BLD AUTO: 214 K/UL — SIGNIFICANT CHANGE UP (ref 130–400)
POTASSIUM SERPL-MCNC: 4 MMOL/L — SIGNIFICANT CHANGE UP (ref 3.5–5)
POTASSIUM SERPL-SCNC: 4 MMOL/L — SIGNIFICANT CHANGE UP (ref 3.5–5)
PROT SERPL-MCNC: 6.3 G/DL — SIGNIFICANT CHANGE UP (ref 6–8)
RBC # BLD: 3.5 M/UL — LOW (ref 4.2–5.4)
RBC # FLD: 14.8 % — HIGH (ref 11.5–14.5)
SODIUM SERPL-SCNC: 132 MMOL/L — LOW (ref 135–146)
WBC # BLD: 6.64 K/UL — SIGNIFICANT CHANGE UP (ref 4.8–10.8)
WBC # FLD AUTO: 6.64 K/UL — SIGNIFICANT CHANGE UP (ref 4.8–10.8)

## 2023-01-26 PROCEDURE — 99232 SBSQ HOSP IP/OBS MODERATE 35: CPT

## 2023-01-26 RX ORDER — ERYTHROPOIETIN 10000 [IU]/ML
3000 INJECTION, SOLUTION INTRAVENOUS; SUBCUTANEOUS
Refills: 0 | Status: DISCONTINUED | OUTPATIENT
Start: 2023-01-26 | End: 2023-01-27

## 2023-01-26 RX ORDER — CEFTRIAXONE 500 MG/1
1000 INJECTION, POWDER, FOR SOLUTION INTRAMUSCULAR; INTRAVENOUS EVERY 24 HOURS
Refills: 0 | Status: DISCONTINUED | OUTPATIENT
Start: 2023-01-26 | End: 2023-01-27

## 2023-01-26 RX ORDER — SODIUM CHLORIDE 9 MG/ML
250 INJECTION INTRAMUSCULAR; INTRAVENOUS; SUBCUTANEOUS ONCE
Refills: 0 | Status: COMPLETED | OUTPATIENT
Start: 2023-01-26 | End: 2023-01-26

## 2023-01-26 RX ORDER — POLYETHYLENE GLYCOL 3350 17 G/17G
17 POWDER, FOR SOLUTION ORAL DAILY
Refills: 0 | Status: DISCONTINUED | OUTPATIENT
Start: 2023-01-26 | End: 2023-01-27

## 2023-01-26 RX ADMIN — HEPARIN SODIUM 5000 UNIT(S): 5000 INJECTION INTRAVENOUS; SUBCUTANEOUS at 06:28

## 2023-01-26 RX ADMIN — Medication 8 UNIT(S): at 12:30

## 2023-01-26 RX ADMIN — Medication 8 UNIT(S): at 08:47

## 2023-01-26 RX ADMIN — CEFTRIAXONE 100 MILLIGRAM(S): 500 INJECTION, POWDER, FOR SOLUTION INTRAMUSCULAR; INTRAVENOUS at 12:30

## 2023-01-26 RX ADMIN — VALSARTAN 160 MILLIGRAM(S): 80 TABLET ORAL at 06:27

## 2023-01-26 RX ADMIN — AMLODIPINE BESYLATE 5 MILLIGRAM(S): 2.5 TABLET ORAL at 06:27

## 2023-01-26 RX ADMIN — Medication 12: at 12:31

## 2023-01-26 RX ADMIN — Medication 8 UNIT(S): at 17:19

## 2023-01-26 RX ADMIN — SODIUM CHLORIDE 125 MILLILITER(S): 9 INJECTION INTRAMUSCULAR; INTRAVENOUS; SUBCUTANEOUS at 13:50

## 2023-01-26 RX ADMIN — LATANOPROST 1 DROP(S): 0.05 SOLUTION/ DROPS OPHTHALMIC; TOPICAL at 23:40

## 2023-01-26 RX ADMIN — Medication 81 MILLIGRAM(S): at 12:31

## 2023-01-26 RX ADMIN — INSULIN GLARGINE 24 UNIT(S): 100 INJECTION, SOLUTION SUBCUTANEOUS at 23:39

## 2023-01-26 RX ADMIN — GABAPENTIN 100 MILLIGRAM(S): 400 CAPSULE ORAL at 23:26

## 2023-01-26 RX ADMIN — BUDESONIDE AND FORMOTEROL FUMARATE DIHYDRATE 2 PUFF(S): 160; 4.5 AEROSOL RESPIRATORY (INHALATION) at 23:40

## 2023-01-26 RX ADMIN — ATORVASTATIN CALCIUM 40 MILLIGRAM(S): 80 TABLET, FILM COATED ORAL at 23:26

## 2023-01-26 RX ADMIN — GABAPENTIN 100 MILLIGRAM(S): 400 CAPSULE ORAL at 06:27

## 2023-01-26 RX ADMIN — BRIMONIDINE TARTRATE 1 DROP(S): 2 SOLUTION/ DROPS OPHTHALMIC at 06:28

## 2023-01-26 RX ADMIN — HEPARIN SODIUM 5000 UNIT(S): 5000 INJECTION INTRAVENOUS; SUBCUTANEOUS at 17:19

## 2023-01-26 RX ADMIN — BRIMONIDINE TARTRATE 1 DROP(S): 2 SOLUTION/ DROPS OPHTHALMIC at 17:20

## 2023-01-26 RX ADMIN — Medication 8: at 08:46

## 2023-01-26 RX ADMIN — BUDESONIDE AND FORMOTEROL FUMARATE DIHYDRATE 2 PUFF(S): 160; 4.5 AEROSOL RESPIRATORY (INHALATION) at 08:48

## 2023-01-26 NOTE — PROGRESS NOTE ADULT - SUBJECTIVE AND OBJECTIVE BOX
JULEE TOLLIVER  82y  Female[phone]      Patient is a 82y old  Female who presents with a chief complaint of Hyperglycemia (25 Jan 2023 14:36)      INTERVAL HPI/OVERNIGHT EVENTS:        REVIEW OF SYSTEMS:        FAMILY HISTORY:    T(C): 36.1 (01-26-23 @ 06:31), Max: 36.9 (01-26-23 @ 00:37)  HR: 72 (01-26-23 @ 06:31) (71 - 81)  BP: 139/65 (01-26-23 @ 06:31) (139/65 - 178/79)  RR: 18 (01-26-23 @ 06:31) (18 - 19)  SpO2: 98% (01-26-23 @ 06:31) (97% - 100%)  Wt(kg): --Vital Signs Last 24 Hrs  T(C): 36.1 (26 Jan 2023 06:31), Max: 36.9 (26 Jan 2023 00:37)  T(F): 97 (26 Jan 2023 06:31), Max: 98.5 (26 Jan 2023 00:37)  HR: 72 (26 Jan 2023 06:31) (71 - 81)  BP: 139/65 (26 Jan 2023 06:31) (139/65 - 178/79)  BP(mean): --  RR: 18 (26 Jan 2023 06:31) (18 - 19)  SpO2: 98% (26 Jan 2023 06:31) (97% - 100%)    Parameters below as of 26 Jan 2023 06:31  Patient On (Oxygen Delivery Method): room air        PHYSICAL EXAM:  GENERAL: NAD, well-groomed, well-developed  HEAD:  Atraumatic, Normocephalic  EYES: EOMI, PERRLA, conjunctiva and sclera clear  ENMT: No tonsillar erythema, exudates, or enlargement; Moist mucous membranes, Good dentition, No lesions  NECK: Supple, No JVD, Normal thyroid  NERVOUS SYSTEM:  Alert & Oriented X3, Good concentration; Motor Strength 5/5 B/L upper and lower extremities; DTRs 2+ intact and symmetric  PULM: Clear to auscultation bilaterally  CARDIAC: Regular rate and rhythm; No murmurs, rubs, or gallops  GI: Soft, Nontender, Nondistended; Bowel sounds present  EXTREMITIES:  2+ Peripheral Pulses, No clubbing, cyanosis, or edema  LYMPH: No lymphadenopathy noted  SKIN: No rashes or lesions    Consultant(s) Notes Reviewed:  [x ] YES  [ ] NO  Care Discussed with Consultants/Other Providers [ x] YES  [ ] NO    LABS:                            10.2   9.09  )-----------( 212      ( 24 Jan 2023 22:35 )             31.5   01-24    129<L>  |  88<L>  |  48<H>  ----------------------------<  646<HH>  4.5   |  27  |  5.5<HH>    Ca    8.2<L>      24 Jan 2023 22:35  Mg     1.9     01-24    TPro  6.4  /  Alb  3.6  /  TBili  0.2  /  DBili  x   /  AST  12  /  ALT  8   /  AlkPhos  141<H>  01-24            acetaminophen     Tablet .. 650 milliGRAM(s) Oral every 6 hours PRN  albuterol    90 MICROgram(s) HFA Inhaler 1 Puff(s) Inhalation every 4 hours PRN  amLODIPine   Tablet 5 milliGRAM(s) Oral daily  aspirin  chewable 81 milliGRAM(s) Oral daily  atorvastatin 40 milliGRAM(s) Oral at bedtime  brimonidine 0.2% Ophthalmic Solution 1 Drop(s) Both EYES every 12 hours  budesonide 160 MICROgram(s)/formoterol 4.5 MICROgram(s) Inhaler 2 Puff(s) Inhalation two times a day  cefTRIAXone   IVPB 1000 milliGRAM(s) IV Intermittent every 24 hours  gabapentin 100 milliGRAM(s) Oral every 8 hours  heparin   Injectable 5000 Unit(s) SubCutaneous every 12 hours  insulin glargine Injectable (LANTUS) 24 Unit(s) SubCutaneous at bedtime  insulin lispro (ADMELOG) corrective regimen sliding scale   SubCutaneous three times a day before meals  insulin lispro Injectable (ADMELOG) 8 Unit(s) SubCutaneous three times a day before meals  latanoprost 0.005% Ophthalmic Solution 1 Drop(s) Both EYES at bedtime  melatonin 3 milliGRAM(s) Oral at bedtime PRN  valsartan 160 milliGRAM(s) Oral daily    82 y/o woman with PMH of HTN, DM on insulin, asthma, ESRD on HD M/W/F and PSH of JUDAH and cholecystectomy presented to the ED for high sugars associated with polyuria.     1. DM-II  with Hyperglycemia due to non-compliance  * pt ran out of her pioglitazone 15mg po daily   - Admit to medicine   - s/p insulin IV and now on insulin SQ (lantus and lispro)  - Cont atorvastatin 40mg HS   - HgbA1C:pending   - Endocrine consult:pending   - PT eval:pending       2. ESRD on HD (M/W/F) - s/p HD today with 2L removed  renal consult appreciated  check Phos level    3. HTN - on valsartan and amlodipine    4. Asthma - stable- on symbicort and albuterol prn    5. Hyponatremia - should improve with better DM control and fluid removal with HD    6. Possible acute UTI (polyuria)   - Start rocephin awaiting urine cx  - Urine cx:pending     7. Continue current management for hyperlipidemia and postherpetic neuralgia    8. DVT prophylaxis    full code  high risk pt .              Case Discussed with House Staff   39  minutes spent on total encounter; more than 50% of the visit was spent counseling and/or coordinating care by the attending physician.   Spectra [phone]     JULEE TOLLIVER  82y  [facility] ER Hold [phone]      Patient is a 82y old  Female who presents with a chief complaint of Hyperglycemia (25 Jan 2023 14:36)      INTERVAL HPI/OVERNIGHT EVENTS:  Patient feels well. She is asking to go home  but her glucose is uncontrolled. no overnight events           FAMILY HISTORY:    T(C): 36.1 (01-26-23 @ 06:31), Max: 36.9 (01-26-23 @ 00:37)  HR: 72 (01-26-23 @ 06:31) (71 - 81)  BP: 139/65 (01-26-23 @ 06:31) (139/65 - 178/79)  RR: 18 (01-26-23 @ 06:31) (18 - 19)  SpO2: 98% (01-26-23 @ 06:31) (97% - 100%)  Wt(kg): --Vital Signs Last 24 Hrs  T(C): 36.1 (26 Jan 2023 06:31), Max: 36.9 (26 Jan 2023 00:37)  T(F): 97 (26 Jan 2023 06:31), Max: 98.5 (26 Jan 2023 00:37)  HR: 72 (26 Jan 2023 06:31) (71 - 81)  BP: 139/65 (26 Jan 2023 06:31) (139/65 - 178/79)  BP(mean): --  RR: 18 (26 Jan 2023 06:31) (18 - 19)  SpO2: 98% (26 Jan 2023 06:31) (97% - 100%)    Parameters below as of 26 Jan 2023 06:31  Patient On (Oxygen Delivery Method): room air        PHYSICAL EXAM:  GENERAL: NAD, well-groomed, well-developed  NERVOUS SYSTEM:  Alert & Oriented X3, Good concentration  PULM: Clear to auscultation bilaterally  CARDIAC: Regular rate and rhythm  GI: Soft, Nontender, Nondistended; Bowel sounds present  EXTREMITIES:  2+ Peripheral Pulses,      Consultant(s) Notes Reviewed:  [x ] YES  [ ] NO  Care Discussed with Consultants/Other Providers [ x] YES  [ ] NO    LABS:                            10.2   9.09  )-----------( 212      ( 24 Jan 2023 22:35 )             31.5   01-24    129<L>  |  88<L>  |  48<H>  ----------------------------<  646<HH>  4.5   |  27  |  5.5<HH>    Ca    8.2<L>      24 Jan 2023 22:35  Mg     1.9     01-24    TPro  6.4  /  Alb  3.6  /  TBili  0.2  /  DBili  x   /  AST  12  /  ALT  8   /  AlkPhos  141<H>  01-24            acetaminophen     Tablet .. 650 milliGRAM(s) Oral every 6 hours PRN  albuterol    90 MICROgram(s) HFA Inhaler 1 Puff(s) Inhalation every 4 hours PRN  amLODIPine   Tablet 5 milliGRAM(s) Oral daily  aspirin  chewable 81 milliGRAM(s) Oral daily  atorvastatin 40 milliGRAM(s) Oral at bedtime  brimonidine 0.2% Ophthalmic Solution 1 Drop(s) Both EYES every 12 hours  budesonide 160 MICROgram(s)/formoterol 4.5 MICROgram(s) Inhaler 2 Puff(s) Inhalation two times a day  cefTRIAXone   IVPB 1000 milliGRAM(s) IV Intermittent every 24 hours  gabapentin 100 milliGRAM(s) Oral every 8 hours  heparin   Injectable 5000 Unit(s) SubCutaneous every 12 hours  insulin glargine Injectable (LANTUS) 24 Unit(s) SubCutaneous at bedtime  insulin lispro (ADMELOG) corrective regimen sliding scale   SubCutaneous three times a day before meals  insulin lispro Injectable (ADMELOG) 8 Unit(s) SubCutaneous three times a day before meals  latanoprost 0.005% Ophthalmic Solution 1 Drop(s) Both EYES at bedtime  melatonin 3 milliGRAM(s) Oral at bedtime PRN  valsartan 160 milliGRAM(s) Oral daily    80 y/o woman with PMH of HTN, DM on insulin, asthma, ESRD on HD M/W/F and PSH of JUDAH and cholecystectomy presented to the ED for high sugars associated with polyuria.     1. DM-II  with Hyperglycemia due to non-compliance  * pt ran out of her pioglitazone 15mg po daily   - Admit to medicine   - s/p insulin IV and now on insulin SQ (lantus and lispro)  - Cont atorvastatin 40mg HS   - HgbA1C:pending   - Endocrine consult:pending   - PT eval:pending       2. ESRD on HD (M/W/F) - s/p HD today with 2L removed  renal consult appreciated  check Phos level    3. HTN - on valsartan and amlodipine    4. Asthma - stable- on symbicort and albuterol prn    5. Hyponatremia - should improve with better DM control and fluid removal with HD    6. Possible acute UTI (polyuria)   - Start rocephin awaiting urine cx  - Urine cx:pending     7. Continue current management for hyperlipidemia and postherpetic neuralgia    8. Constipation   - Start laxative     9. DVT prophylaxis    full code        Case Discussed with House Staff   39  minutes spent on total encounter; more than 50% of the visit was spent counseling and/or coordinating care by the attending physician.   Spectra [phone]

## 2023-01-26 NOTE — PROGRESS NOTE ADULT - ASSESSMENT
ESRD on HD MWF at [facility]  DM / hyperglycemia  Anemia of CKD  Hyperphosphatemia    Plan:    HD tomorrow: 3 hours, opti 160 dialyzer, 2K bath, 2L UF  Add EPO 3000 units IV HD  F/U urine culture  Renally dose antibiotics  Renal diet

## 2023-01-26 NOTE — PROGRESS NOTE ADULT - SUBJECTIVE AND OBJECTIVE BOX
[place] NEPHROLOGY FOLLOW UP NOTE  --------------------------------------------------------------------------------  24 hour events/subjective: Patient examined. Appears comfortable.    PAST HISTORY  --------------------------------------------------------------------------------  No significant changes to PMH, PSH, FHx, SHx, unless otherwise noted    ALLERGIES & MEDICATIONS  --------------------------------------------------------------------------------  Allergies    No Known Allergies    Standing Inpatient Medications  amLODIPine   Tablet 5 milliGRAM(s) Oral daily  aspirin  chewable 81 milliGRAM(s) Oral daily  atorvastatin 40 milliGRAM(s) Oral at bedtime  brimonidine 0.2% Ophthalmic Solution 1 Drop(s) Both EYES every 12 hours  budesonide 160 MICROgram(s)/formoterol 4.5 MICROgram(s) Inhaler 2 Puff(s) Inhalation two times a day  cefTRIAXone   IVPB 1000 milliGRAM(s) IV Intermittent every 24 hours  gabapentin 100 milliGRAM(s) Oral every 8 hours  heparin   Injectable 5000 Unit(s) SubCutaneous every 12 hours  insulin glargine Injectable (LANTUS) 24 Unit(s) SubCutaneous at bedtime  insulin lispro (ADMELOG) corrective regimen sliding scale   SubCutaneous three times a day before meals  insulin lispro Injectable (ADMELOG) 8 Unit(s) SubCutaneous three times a day before meals  latanoprost 0.005% Ophthalmic Solution 1 Drop(s) Both EYES at bedtime  polyethylene glycol 3350 17 Gram(s) Oral daily  valsartan 160 milliGRAM(s) Oral daily    PRN Inpatient Medications  acetaminophen     Tablet .. 650 milliGRAM(s) Oral every 6 hours PRN  albuterol    90 MICROgram(s) HFA Inhaler 1 Puff(s) Inhalation every 4 hours PRN  melatonin 3 milliGRAM(s) Oral at bedtime PRN    VITALS/PHYSICAL EXAM  --------------------------------------------------------------------------------  T(C): 36.1 (01-26-23 @ 06:31), Max: 36.9 (01-26-23 @ 00:37)  HR: 72 (01-26-23 @ 06:31) (71 - 81)  BP: 139/65 (01-26-23 @ 06:31) (139/65 - 178/79)  RR: 18 (01-26-23 @ 06:31) (18 - 19)  SpO2: 98% (01-26-23 @ 06:31) (97% - 100%)  Weight (kg): 100 (01-25-23 @ 11:25)    01-25-23 @ 07:01  -  01-26-23 @ 07:00  --------------------------------------------------------  IN: 0 mL / OUT: 2000 mL / NET: -2000 mL    Physical Exam:  	Gen: NAD  	Pulm: CTA B/L  	CV: RRR, S1S2  	Abd: +BS, soft, nontender/nondistended  	: No suprapubic tenderness  	LE: Warm,  no edema  	Vascular access: AVF    LABS/STUDIES  --------------------------------------------------------------------------------              10.3   6.64  >-----------<  214      [01-26-23 @ 12:13]              31.6     129  |  88  |  48  ----------------------------<  646      [01-24-23 @ 22:35]  4.5   |  27  |  5.5        Ca     8.2     [01-24-23 @ 22:35]      Mg     1.9     [01-24-23 @ 22:35]    TPro  6.4  /  Alb  3.6  /  TBili  0.2  /  DBili  x   /  AST  12  /  ALT  8   /  AlkPhos  141  [01-24-23 @ 22:35]    PT/INR: PT 11.40, INR 1.00       [01-24-23 @ 22:35]  PTT: 24.2       [01-24-23 @ 22:35]    Troponin <0.01      [01-25-23 @ 03:11]    Creatinine Trend:  SCr 5.5 [01-24 @ 22:35]    Urinalysis - [01-25-23 @ 03:00]      Color Light Yellow / Appearance Slightly Turbid / SG 1.010 / pH 7.5      Gluc >= 1000 mg/dL / Ketone Negative  / Bili Negative / Urobili <2 mg/dL       Blood Small / Protein 100 mg/dL / Leuk Est Large / Nitrite Negative      RBC 10 /  / Hyaline 0 / Gran  / Sq Epi  / Non Sq Epi 2 / Bacteria Few    HbA1c 8.3      [04-13-18 @ 06:02]

## 2023-01-27 ENCOUNTER — TRANSCRIPTION ENCOUNTER (OUTPATIENT)
Age: 83
End: 2023-01-27

## 2023-01-27 VITALS — DIASTOLIC BLOOD PRESSURE: 56 MMHG | SYSTOLIC BLOOD PRESSURE: 140 MMHG | HEART RATE: 75 BPM | RESPIRATION RATE: 19 BRPM

## 2023-01-27 LAB
ALBUMIN SERPL ELPH-MCNC: 3.1 G/DL — LOW (ref 3.5–5.2)
ALP SERPL-CCNC: 121 U/L — HIGH (ref 30–115)
ALT FLD-CCNC: 7 U/L — SIGNIFICANT CHANGE UP (ref 0–41)
ANION GAP SERPL CALC-SCNC: 13 MMOL/L — SIGNIFICANT CHANGE UP (ref 7–14)
AST SERPL-CCNC: 14 U/L — SIGNIFICANT CHANGE UP (ref 0–41)
BASOPHILS # BLD AUTO: 0.05 K/UL — SIGNIFICANT CHANGE UP (ref 0–0.2)
BASOPHILS NFR BLD AUTO: 0.7 % — SIGNIFICANT CHANGE UP (ref 0–1)
BILIRUB SERPL-MCNC: 0.3 MG/DL — SIGNIFICANT CHANGE UP (ref 0.2–1.2)
BUN SERPL-MCNC: 61 MG/DL — CRITICAL HIGH (ref 10–20)
CALCIUM SERPL-MCNC: 8 MG/DL — LOW (ref 8.4–10.4)
CHLORIDE SERPL-SCNC: 94 MMOL/L — LOW (ref 98–110)
CO2 SERPL-SCNC: 27 MMOL/L — SIGNIFICANT CHANGE UP (ref 17–32)
CREAT SERPL-MCNC: 6.2 MG/DL — CRITICAL HIGH (ref 0.7–1.5)
CULTURE RESULTS: SIGNIFICANT CHANGE UP
EGFR: 6 ML/MIN/1.73M2 — LOW
EOSINOPHIL # BLD AUTO: 0.36 K/UL — SIGNIFICANT CHANGE UP (ref 0–0.7)
EOSINOPHIL NFR BLD AUTO: 4.7 % — SIGNIFICANT CHANGE UP (ref 0–8)
GLUCOSE BLDC GLUCOMTR-MCNC: 257 MG/DL — HIGH (ref 70–99)
GLUCOSE BLDC GLUCOMTR-MCNC: 341 MG/DL — HIGH (ref 70–99)
GLUCOSE SERPL-MCNC: 234 MG/DL — HIGH (ref 70–99)
HCT VFR BLD CALC: 30.6 % — LOW (ref 37–47)
HGB BLD-MCNC: 10.1 G/DL — LOW (ref 12–16)
IMM GRANULOCYTES NFR BLD AUTO: 0.5 % — HIGH (ref 0.1–0.3)
LYMPHOCYTES # BLD AUTO: 1.84 K/UL — SIGNIFICANT CHANGE UP (ref 1.2–3.4)
LYMPHOCYTES # BLD AUTO: 24.1 % — SIGNIFICANT CHANGE UP (ref 20.5–51.1)
MAGNESIUM SERPL-MCNC: 1.8 MG/DL — SIGNIFICANT CHANGE UP (ref 1.8–2.4)
MCHC RBC-ENTMCNC: 29.7 PG — SIGNIFICANT CHANGE UP (ref 27–31)
MCHC RBC-ENTMCNC: 33 G/DL — SIGNIFICANT CHANGE UP (ref 32–37)
MCV RBC AUTO: 90 FL — SIGNIFICANT CHANGE UP (ref 81–99)
MONOCYTES # BLD AUTO: 1.09 K/UL — HIGH (ref 0.1–0.6)
MONOCYTES NFR BLD AUTO: 14.3 % — HIGH (ref 1.7–9.3)
NEUTROPHILS # BLD AUTO: 4.24 K/UL — SIGNIFICANT CHANGE UP (ref 1.4–6.5)
NEUTROPHILS NFR BLD AUTO: 55.7 % — SIGNIFICANT CHANGE UP (ref 42.2–75.2)
NRBC # BLD: 0 /100 WBCS — SIGNIFICANT CHANGE UP (ref 0–0)
PHOSPHATE SERPL-MCNC: 4.2 MG/DL — SIGNIFICANT CHANGE UP (ref 2.1–4.9)
PLATELET # BLD AUTO: 228 K/UL — SIGNIFICANT CHANGE UP (ref 130–400)
POTASSIUM SERPL-MCNC: 4.2 MMOL/L — SIGNIFICANT CHANGE UP (ref 3.5–5)
POTASSIUM SERPL-SCNC: 4.2 MMOL/L — SIGNIFICANT CHANGE UP (ref 3.5–5)
PROT SERPL-MCNC: 6 G/DL — SIGNIFICANT CHANGE UP (ref 6–8)
RBC # BLD: 3.4 M/UL — LOW (ref 4.2–5.4)
RBC # FLD: 14.7 % — HIGH (ref 11.5–14.5)
SODIUM SERPL-SCNC: 134 MMOL/L — LOW (ref 135–146)
SPECIMEN SOURCE: SIGNIFICANT CHANGE UP
WBC # BLD: 7.62 K/UL — SIGNIFICANT CHANGE UP (ref 4.8–10.8)
WBC # FLD AUTO: 7.62 K/UL — SIGNIFICANT CHANGE UP (ref 4.8–10.8)

## 2023-01-27 PROCEDURE — 99239 HOSP IP/OBS DSCHRG MGMT >30: CPT

## 2023-01-27 RX ORDER — INSULIN GLARGINE 100 [IU]/ML
50 INJECTION, SOLUTION SUBCUTANEOUS
Qty: 5 | Refills: 0
Start: 2023-01-27 | End: 2023-02-25

## 2023-01-27 RX ORDER — SIMETHICONE 80 MG/1
80 TABLET, CHEWABLE ORAL DAILY
Refills: 0 | Status: DISCONTINUED | OUTPATIENT
Start: 2023-01-27 | End: 2023-01-27

## 2023-01-27 RX ORDER — INSULIN GLARGINE 100 [IU]/ML
27 INJECTION, SOLUTION SUBCUTANEOUS AT BEDTIME
Refills: 0 | Status: DISCONTINUED | OUTPATIENT
Start: 2023-01-27 | End: 2023-01-27

## 2023-01-27 RX ORDER — SEMAGLUTIDE 0.68 MG/ML
0 INJECTION, SOLUTION SUBCUTANEOUS
Qty: 0 | Refills: 3 | DISCHARGE

## 2023-01-27 RX ORDER — INSULIN LISPRO 100/ML
0 VIAL (ML) SUBCUTANEOUS
Qty: 0 | Refills: 3 | DISCHARGE

## 2023-01-27 RX ORDER — BLOOD-GLUCOSE METER
W/DEVICE EACH MISCELLANEOUS
Qty: 1 | Refills: 0 | Status: ACTIVE | COMMUNITY
Start: 2023-01-27 | End: 1900-01-01

## 2023-01-27 RX ORDER — SEMAGLUTIDE 0.68 MG/ML
0.5 INJECTION, SOLUTION SUBCUTANEOUS
Qty: 3 | Refills: 0
Start: 2023-01-27 | End: 2023-02-25

## 2023-01-27 RX ORDER — SEMAGLUTIDE 1.34 MG/ML
2 INJECTION, SOLUTION SUBCUTANEOUS
Qty: 1 | Refills: 0 | Status: ACTIVE | COMMUNITY
Start: 2020-02-20 | End: 1900-01-01

## 2023-01-27 RX ORDER — PEN NEEDLE, DIABETIC 29 G X1/2"
31G X 5 MM NEEDLE, DISPOSABLE MISCELLANEOUS
Qty: 100 | Refills: 2 | Status: ACTIVE | COMMUNITY
Start: 2023-01-27 | End: 1900-01-01

## 2023-01-27 RX ORDER — CEFPODOXIME PROXETIL 100 MG
1 TABLET ORAL
Qty: 1 | Refills: 0
Start: 2023-01-27 | End: 2023-01-27

## 2023-01-27 RX ORDER — PIOGLITAZONE HYDROCHLORIDE 15 MG/1
0.5 TABLET ORAL
Qty: 0 | Refills: 0 | DISCHARGE

## 2023-01-27 RX ORDER — POLYETHYLENE GLYCOL 3350 17 G/17G
17 POWDER, FOR SOLUTION ORAL
Qty: 510 | Refills: 0
Start: 2023-01-27 | End: 2023-02-25

## 2023-01-27 RX ORDER — LANCETS 33 GAUGE
EACH MISCELLANEOUS
Qty: 300 | Refills: 0 | Status: ACTIVE | COMMUNITY
Start: 2023-01-27 | End: 1900-01-01

## 2023-01-27 RX ORDER — PIOGLITAZONE HYDROCHLORIDE 15 MG/1
1 TABLET ORAL
Qty: 30 | Refills: 0
Start: 2023-01-27 | End: 2023-02-25

## 2023-01-27 RX ORDER — BLOOD SUGAR DIAGNOSTIC
STRIP MISCELLANEOUS 3 TIMES DAILY
Qty: 300 | Refills: 2 | Status: ACTIVE | COMMUNITY
Start: 2023-01-27 | End: 1900-01-01

## 2023-01-27 RX ORDER — INSULIN GLARGINE 300 U/ML
300 INJECTION, SOLUTION SUBCUTANEOUS DAILY
Qty: 2 | Refills: 5 | Status: ACTIVE | COMMUNITY
Start: 2023-01-27 | End: 1900-01-01

## 2023-01-27 RX ADMIN — GABAPENTIN 100 MILLIGRAM(S): 400 CAPSULE ORAL at 05:11

## 2023-01-27 RX ADMIN — BRIMONIDINE TARTRATE 1 DROP(S): 2 SOLUTION/ DROPS OPHTHALMIC at 05:12

## 2023-01-27 RX ADMIN — HEPARIN SODIUM 5000 UNIT(S): 5000 INJECTION INTRAVENOUS; SUBCUTANEOUS at 05:11

## 2023-01-27 RX ADMIN — Medication 8 UNIT(S): at 10:39

## 2023-01-27 RX ADMIN — Medication 650 MILLIGRAM(S): at 01:53

## 2023-01-27 RX ADMIN — ERYTHROPOIETIN 3000 UNIT(S): 10000 INJECTION, SOLUTION INTRAVENOUS; SUBCUTANEOUS at 13:33

## 2023-01-27 RX ADMIN — BUDESONIDE AND FORMOTEROL FUMARATE DIHYDRATE 2 PUFF(S): 160; 4.5 AEROSOL RESPIRATORY (INHALATION) at 10:40

## 2023-01-27 RX ADMIN — AMLODIPINE BESYLATE 5 MILLIGRAM(S): 2.5 TABLET ORAL at 05:11

## 2023-01-27 RX ADMIN — VALSARTAN 160 MILLIGRAM(S): 80 TABLET ORAL at 05:12

## 2023-01-27 RX ADMIN — SIMETHICONE 80 MILLIGRAM(S): 80 TABLET, CHEWABLE ORAL at 01:52

## 2023-01-27 RX ADMIN — Medication 6: at 10:40

## 2023-01-27 NOTE — CONSULT NOTE ADULT - SUBJECTIVE AND OBJECTIVE BOX
Patient is a 82y old  Female who presents with a chief complaint of Hyperglycemia (27 Jan 2023 07:56)    HPI:  82 y/o female with PMH of HTN, DM, asthma, ESRD on HD MWF, JUDAH, cholecystectomy, non-smoker presents to ED for high sugars associated with polyuria. Patient ran out of her pioglitazone a week ago and is not compliant with her insulin. Patient also complaining of dizziness and weakness not being able to stand. Patient denies any fevers, chills, headaches, chest pain, palpitations, sob, cough, abd pain, dysuria, le swelling.     In ED vitals: T 97.1, , /74, RR 15, Spo2 100% on RA   CT head unremarkable, CXR negative. Labs significant for glucose 646, Cr 5.5, BUN 48, trop 0.02, BetaOH 0.4, UA pos large LE, , few bacteria.   Patient given IVF and insulin in the ED  (25 Jan 2023 10:59)      FAMILY HISTORY:    PAST MEDICAL & SURGICAL HISTORY:  Asthma, unspecified asthma severity, unspecified whether complicated, unspecified whether persistent      Hypertension affecting pregnancy, antepartum      Type 2 diabetes mellitus without complication, with long-term current use of insulin      No significant past surgical history        Birth History:  Developmental History:    REVIEW of SYSTEMS:  All other review of systems is negative unless indicated above.    Allergies    No Known Allergies    Intolerances      MEDICATIONS  (STANDING):  amLODIPine   Tablet 5 milliGRAM(s) Oral daily  aspirin  chewable 81 milliGRAM(s) Oral daily  atorvastatin 40 milliGRAM(s) Oral at bedtime  brimonidine 0.2% Ophthalmic Solution 1 Drop(s) Both EYES every 12 hours  budesonide 160 MICROgram(s)/formoterol 4.5 MICROgram(s) Inhaler 2 Puff(s) Inhalation two times a day  cefTRIAXone   IVPB 1000 milliGRAM(s) IV Intermittent every 24 hours  epoetin shannon-epbx (RETACRIT) Injectable 3000 Unit(s) IV Push <User Schedule>  gabapentin 100 milliGRAM(s) Oral every 8 hours  heparin   Injectable 5000 Unit(s) SubCutaneous every 12 hours  insulin glargine Injectable (LANTUS) 27 Unit(s) SubCutaneous at bedtime  insulin lispro (ADMELOG) corrective regimen sliding scale   SubCutaneous three times a day before meals  insulin lispro Injectable (ADMELOG) 8 Unit(s) SubCutaneous three times a day before meals  latanoprost 0.005% Ophthalmic Solution 1 Drop(s) Both EYES at bedtime  polyethylene glycol 3350 17 Gram(s) Oral daily  valsartan 160 milliGRAM(s) Oral daily    MEDICATIONS  (PRN):  acetaminophen     Tablet .. 650 milliGRAM(s) Oral every 6 hours PRN Temp greater or equal to 38C (100.4F), Mild Pain (1 - 3)  albuterol    90 MICROgram(s) HFA Inhaler 1 Puff(s) Inhalation every 4 hours PRN Shortness of Breath and/or Wheezing  melatonin 3 milliGRAM(s) Oral at bedtime PRN Insomnia  simethicone 80 milliGRAM(s) Chew daily PRN Gas      Vital Signs Last 24 Hrs  T(C): 36.5 (27 Jan 2023 07:45), Max: 36.9 (27 Jan 2023 00:50)  T(F): 97.7 (27 Jan 2023 07:45), Max: 98.4 (27 Jan 2023 00:50)  HR: 76 (27 Jan 2023 11:45) (65 - 78)  BP: 124/41 (27 Jan 2023 11:45) (98/47 - 169/70)  BP(mean): 78 (27 Jan 2023 09:54) (78 - 78)  RR: 19 (27 Jan 2023 11:45) (18 - 19)  SpO2: 97% (27 Jan 2023 07:45) (97% - 100%)    Parameters below as of 27 Jan 2023 11:45  Patient On (Oxygen Delivery Method): room air        Weight (kg): 100 (01-25 @ 11:25)    PHYSICAL EXAM  GENERAL: NAD, well-developed  NECK: supple, no cervical adenopathy, no palpable thyroid  CHEST/LUNG: CTAB; No wheeze  HEART: RRR; S1/S2, No murmurs, rubs, or gallops  ABDOMEN: Soft, NT/ND; BS present  EXTREMITIES:  No cyanosis, or edema  NEUROLOGY: AAOx3  SKIN: No rashes, no acanthosis nigricans      LABS                          10.1   7.62  )-----------( 228      ( 27 Jan 2023 07:00 )             30.6     01-27    134<L>  |  94<L>  |  61<HH>  ----------------------------<  234<H>  4.2   |  27  |  6.2<HH>    Ca    8.0<L>      27 Jan 2023 07:00  Phos  4.2     01-27  Mg     1.8     01-27    TPro  6.0  /  Alb  3.1<L>  /  TBili  0.3  /  DBili  x   /  AST  14  /  ALT  7   /  AlkPhos  121<H>  01-27        CAPILLARY BLOOD GLUCOSE      POCT Blood Glucose.: 341 mg/dL (27 Jan 2023 11:28)  POCT Blood Glucose.: 257 mg/dL (27 Jan 2023 09:50)  POCT Blood Glucose.: 134 mg/dL (26 Jan 2023 17:12)  
NEPHROLOGY CONSULTATION NOTE    THIS CONSULT IS INCOMPLETE / FULL CONSULT TO FOLLOW    Patient is a 82y Female whom presented to the hospital with     PAST MEDICAL & SURGICAL HISTORY:  Asthma, unspecified asthma severity, unspecified whether complicated, unspecified whether persistent      Hypertension affecting pregnancy, antepartum      Type 2 diabetes mellitus without complication, with long-term current use of insulin      No significant past surgical history        Allergies:  No Known Allergies    Home Medications Reviewed  Hospital Medications:   MEDICATIONS  (STANDING):  amLODIPine   Tablet 5 milliGRAM(s) Oral daily  aspirin  chewable 81 milliGRAM(s) Oral daily  atorvastatin 40 milliGRAM(s) Oral at bedtime  brimonidine 0.2% Ophthalmic Solution 1 Drop(s) Both EYES every 12 hours  budesonide 160 MICROgram(s)/formoterol 4.5 MICROgram(s) Inhaler 2 Puff(s) Inhalation two times a day  gabapentin 100 milliGRAM(s) Oral every 8 hours  heparin   Injectable 5000 Unit(s) SubCutaneous every 12 hours  insulin glargine Injectable (LANTUS) 24 Unit(s) SubCutaneous at bedtime  insulin lispro (ADMELOG) corrective regimen sliding scale   SubCutaneous three times a day before meals  insulin lispro Injectable (ADMELOG) 8 Unit(s) SubCutaneous three times a day before meals  latanoprost 0.005% Ophthalmic Solution 1 Drop(s) Both EYES at bedtime  valsartan 160 milliGRAM(s) Oral daily      SOCIAL HISTORY:  Denies ETOH,Smoking,   FAMILY HISTORY:        REVIEW OF SYSTEMS:  CONSTITUTIONAL: No weakness, fevers or chills  EYES/ENT: No visual changes;  No vertigo or throat pain   NECK: No pain or stiffness  RESPIRATORY: No cough, wheezing, hemoptysis; No shortness of breath  CARDIOVASCULAR: No chest pain or palpitations.  GASTROINTESTINAL: No abdominal or epigastric pain. No nausea, vomiting, or hematemesis; No diarrhea or constipation. No melena or hematochezia.  GENITOURINARY: No dysuria, frequency, foamy urine, urinary urgency, incontinence or hematuria  NEUROLOGICAL: No numbness or weakness  SKIN: No itching, burning, rashes, or lesions   VASCULAR: No bilateral lower extremity edema.   All other review of systems is negative unless indicated above.    VITALS:  T(F): 97.1 (23 @ 06:48), Max: 97.1 (23 @ 06:48)  HR: 100 (23 @ 06:48)  BP: 166/74 (23 @ 06:48)  RR: 15 (23 @ 21:49)  SpO2: 100% (23 @ 09:00)      Weight (kg): 100 ( @ 11:25)    I&O's Detail        PHYSICAL EXAM:  Constitutional: NAD  HEENT: anicteric sclera, oropharynx clear, MMM  Neck: No JVD  Respiratory: CTAB, no wheezes, rales or rhonchi  Cardiovascular: S1, S2, RRR  Gastrointestinal: BS+, soft, NT/ND  Extremities: No cyanosis or clubbing. No peripheral edema  Neurological: A/O x 3, no focal deficits  Psychiatric: Normal mood, normal affect  : No CVA tenderness. No alcala.   Skin: No rashes  Vascular Access:    LABS:      129<L>  |  88<L>  |  48<H>  ----------------------------<  646<HH>  4.5   |  27  |  5.5<HH>    Ca    8.2<L>      2023 22:35  Mg     1.9         TPro  6.4  /  Alb  3.6  /  TBili  0.2  /  DBili      /  AST  12  /  ALT  8   /  AlkPhos  141<H>      Creatinine Trend: 5.5 <--                        10.2   9.09  )-----------( 212      ( 2023 22:35 )             31.5     Urine Studies:  Urinalysis Basic - ( 2023 03:00 )    Color: Light Yellow / Appearance: Slightly Turbid / S.010 / pH:   Gluc:  / Ketone: Negative  / Bili: Negative / Urobili: <2 mg/dL   Blood:  / Protein: 100 mg/dL / Nitrite: Negative   Leuk Esterase: Large / RBC: 10 /HPF /  /HPF   Sq Epi:  / Non Sq Epi: 2 /HPF / Bacteria: Few            HbA1c 8.3      [18 @ 06:02]      EMIR: titer 1:80, pattern Homogeneous      [18 @ 16:22]  dsDNA <12      [18 @ 16:22]  SPEP Interpretation: Normal Electrophoresis Pattern      [18 @ 20:04]  Immunofixation Urine: No Monoclonal Band Identified      [18 @ 02:13]  UPEP Interpretation: Mild Selective (Glomerular) Proteinuria      [18 @ 02:13]      RADIOLOGY & ADDITIONAL STUDIES:

## 2023-01-27 NOTE — PHYSICAL THERAPY INITIAL EVALUATION ADULT - GENERAL OBSERVATIONS, REHAB EVAL
[phone] Pt received and left semifowlers on stretcher, NAD, pt agreeable to PT session. +tele +SpO2 +primafit

## 2023-01-27 NOTE — PROGRESS NOTE ADULT - ASSESSMENT
ESRD on HD MWF at [facility]  DM / hyperglycemia  Anemia of CKD  Hyperphosphatemia    Plan:    HD today: 3 hours, opti 160 dialyzer, 2K bath, 2L UF  EPO with HD  Renally dose antibiotics  Renal diet  Endocrinology eval

## 2023-01-27 NOTE — CONSULT NOTE ADULT - ASSESSMENT
80 y/o female with PMH of HTN, DM, asthma, ESRD on HD MWF, JUDAH, cholecystectomy, non-smoker presents to ED for high sugars associated with polyuria. Patient ran out of her pioglitazone a week ago and is not compliant with her insulin. Patient also complaining of dizziness and weakness not being able to stand. 80 y/o female with PMH of HTN, DM, asthma, ESRD on HD MWF, JUDAH, cholecystectomy, non-smoker presents to ED for high sugars associated with polyuria. Patient ran out of her pioglitazone a week ago and is not compliant with her insulin. Patient also complaining of dizziness and weakness not being able to stand.    # uncontrolled DM due to poor compliance  - Patient recently moved back to [place]  - Patient ran out her medications  - Does not check blood glucose regularly at home  - c/w current insulin management  - discharge home on Pioglitazone 15 mg daily + Ozempic  q weekly increase to  q weekly after 2 weeks + Insulin  - Patient counseled for diet and exercise  - outpt endo follow up 80 y/o female with PMH of HTN, DM, asthma, ESRD on HD MWF, JUDAH, cholecystectomy, non-smoker presents to ED for high sugars associated with polyuria. Patient ran out of her pioglitazone a week ago and is not compliant with her insulin. Patient also complaining of dizziness and weakness not being able to stand.    # uncontrolled DM due to poor compliance  - Patient recently moved back to [place]  - Patient ran out her medications  - Does not check blood glucose regularly at home  - c/w current insulin management  - discharge home on Pioglitazone 30  mg daily + Ozempic 0.5 mg  q weekly  first month increase to 1 mg.  q weekly after 4 weeks + Insulin-toujeo max solostar 60 units daily, can reduce dosage if hypoglycemic.. no rapid acting insulin at discharge  - Patient counseled for diet and  exercise  - outpt endo follow up

## 2023-01-27 NOTE — DISCHARGE NOTE PROVIDER - NSDCCPCAREPLAN_GEN_ALL_CORE_FT
PRINCIPAL DISCHARGE DIAGNOSIS  Diagnosis: Generalized weakness  Assessment and Plan of Treatment:       SECONDARY DISCHARGE DIAGNOSES  Diagnosis: Lightheadedness  Assessment and Plan of Treatment:     Diagnosis: Chest tightness  Assessment and Plan of Treatment:     Diagnosis: Hyperglycemia  Assessment and Plan of Treatment:     Diagnosis: ESRD on dialysis  Assessment and Plan of Treatment:

## 2023-01-27 NOTE — DISCHARGE NOTE PROVIDER - ATTENDING DISCHARGE PHYSICAL EXAMINATION:
VITALS:   T(C): 36.5 (01-27-23 @ 07:45), Max: 36.9 (01-27-23 @ 00:50)  HR: 75 (01-27-23 @ 14:45) (65 - 78)  BP: 140/56 (01-27-23 @ 14:45) (98/47 - 169/70)  RR: 19 (01-27-23 @ 14:45) (18 - 19)  SpO2: 97% (01-27-23 @ 07:45) (97% - 100%)    GENERAL: NAD, lying in bed comfortably  HEART: Regular rate and rhythm,  LUNGS: Unlabored respirations.  Clear to auscultation bilaterally,  ABDOMEN: Soft, nontender, nondistended, +BS  EXTREMITIES: 2+ peripheral pulses bilaterally.   NERVOUS SYSTEM:  A&Ox3,

## 2023-01-27 NOTE — PHYSICAL THERAPY INITIAL EVALUATION ADULT - ADDITIONAL COMMENTS
Pt lives alone, grandson stays intermittently, in apartment with elevator access. Pt ambulates with SC, reports hx of 3 falls in last 6 months.

## 2023-01-27 NOTE — DISCHARGE NOTE PROVIDER - NSDCMRMEDTOKEN_GEN_ALL_CORE_FT
albuterol 90 mcg/inh inhalation aerosol: 1 puff(s) inhaled every 4 hours  amlodipine-valsartan 5 mg-160 mg oral tablet: 1 tab(s) orally once a day  aspirin 81 mg oral tablet: 1 tab(s) orally once a day  atorvastatin 40 mg oral tablet: 1 tab(s) orally once a day (at bedtime)  BRIMONIDINE TARTRATE  0.15 % SOLN: 1 application to each affected eye 2 times a day  budesonide-formoterol 160 mcg-4.5 mcg/inh inhalation aerosol: 1 puff(s) inhaled 2 times a day   CALCIUM ACETATE  667 MG CAPS: 2 capsules with each meal and 1 cap with snacks  EZETIMIBE  10 MG TABS:   HUMALOG 100 U/ML KWIK PEN INJ 3ML: per sliding scale  LATANOPROST  0.005 % SOLN: 1 drop in each eye at bedtime for glaucoma  OZEMPIC 1MG PER DOSE (1X4MG PEN): INJECT 1 MG UNDER THE SKIN THE SKIN ONCE WEEKLY  pioglitazone 30 mg oral tablet: 0.5 tab(s) orally once a day   albuterol 90 mcg/inh inhalation aerosol: 1 puff(s) inhaled every 4 hours  amlodipine-valsartan 5 mg-160 mg oral tablet: 1 tab(s) orally once a day  aspirin 81 mg oral tablet: 1 tab(s) orally once a day  atorvastatin 40 mg oral tablet: 1 tab(s) orally once a day (at bedtime)  BRIMONIDINE TARTRATE  0.15 % SOLN: 1 application to each affected eye 2 times a day  budesonide-formoterol 160 mcg-4.5 mcg/inh inhalation aerosol: 1 puff(s) inhaled 2 times a day   CALCIUM ACETATE  667 MG CAPS: 2 capsules with each meal and 1 cap with snacks  EZETIMIBE  10 MG TABS:   HUMALOG 100 U/ML KWIK PEN INJ 3ML: per sliding scale  LATANOPROST  0.005 % SOLN: 1 drop in each eye at bedtime for glaucoma  OZEMPIC 1MG PER DOSE (1X4MG PEN): INJECT 1 MG UNDER THE SKIN THE SKIN ONCE WEEKLY  pioglitazone 30 mg oral tablet: 0.5 tab(s) orally once a day  polyethylene glycol 3350 oral powder for reconstitution: 17 gram(s) orally once a day   albuterol 90 mcg/inh inhalation aerosol: 1 puff(s) inhaled every 4 hours  amlodipine-valsartan 5 mg-160 mg oral tablet: 1 tab(s) orally once a day  aspirin 81 mg oral tablet: 1 tab(s) orally once a day  atorvastatin 40 mg oral tablet: 1 tab(s) orally once a day (at bedtime)  BRIMONIDINE TARTRATE  0.15 % SOLN: 1 application to each affected eye 2 times a day  budesonide-formoterol 160 mcg-4.5 mcg/inh inhalation aerosol: 1 puff(s) inhaled 2 times a day   CALCIUM ACETATE  667 MG CAPS: 2 capsules with each meal and 1 cap with snacks  cefpodoxime 200 mg oral tablet: 1 tab(s) orally once a day   EZETIMIBE  10 MG TABS:   LATANOPROST  0.005 % SOLN: 1 drop in each eye at bedtime for glaucoma  Ozempic 2 mg/1.5 mL (0.25 mg or 0.5 mg dose) subcutaneous solution: 0.5 milligram(s) subcutaneously once a week   pioglitazone 30 mg oral tablet: 1 tab(s) orally once a day   polyethylene glycol 3350 oral powder for reconstitution: 17 gram(s) orally once a day  Toujeo Max SoloStar 300 units/mL subcutaneous solution: 50 unit(s) subcutaneous once a day (at bedtime)

## 2023-01-27 NOTE — DISCHARGE NOTE NURSING/CASE MANAGEMENT/SOCIAL WORK - PATIENT PORTAL LINK FT
You can access the FollowMyHealth Patient Portal offered by [facility] by registering at the following website: [email or web address]. By joining [employer]’s FollowMyHealth portal, you will also be able to view your health information using other applications (apps) compatible with our system.

## 2023-01-27 NOTE — DISCHARGE NOTE PROVIDER - HOSPITAL COURSE
82 y/o woman with PMH of HTN, DM on insulin, asthma, ESRD on HD M/W/F and PSH of JUDAH and cholecystectomy presented to the ED for high sugars associated with polyuria.     1. DM-II  with Hyperglycemia due to non-compliance resolving   * pt ran out of her pioglitazone 15mg po daily   - Admit to medicine   - s/p insulin IV and now on insulin SQ (lantus and lispro)  - Cont atorvastatin 40mg HS   - HgbA1C:11.7  - Endocrine consult:pending   - PT eval:pending       2. ESRD on HD (M/W/F) - s/p HD today with 2L removed  renal consult appreciated  check Phos level    3. HTN - on valsartan and amlodipine    4. Asthma - stable- on symbicort and albuterol prn    5. Hyponatremia - should improve with better DM control and fluid removal with HD    6. Possible acute UTI (polyuria)   - Started on rocephin. DC on Cefpodoxime for 1 more day   - Urine cx:pending     7. Continue current management for hyperlipidemia and postherpetic neuralgia    8. Constipation   - Start laxative . DC on miralax as needed it 82 y/o woman with PMH of HTN, DM on insulin, asthma, ESRD on HD M/W/F and PSH of JUDAH and cholecystectomy presented to the ED for high sugars associated with polyuria.     1. DM-II  with Hyperglycemia due to non-compliance resolving   * pt ran out of her pioglitazone 15mg po daily   - Admit to medicine   - s/p insulin IV and now on insulin SQ (lantus and lispro)  - Cont atorvastatin 40mg HS   - HgbA1C:11.7  - Endocrine consult appreciated  - DC on pioglitazone 15mg po HS  - DC on Ozempic 0.25mg qweek for 2 weeks then 0.5mg sc qweek   - PT eval appreciated       2. ESRD on HD (M/W/F) - s/p HD today with 2L removed  renal consult appreciated  check Phos level    3. HTN - on valsartan and amlodipine    4. Asthma - stable- on symbicort and albuterol prn    5. Hyponatremia - should improve with better DM control and fluid removal with HD    6. Possible acute UTI (polyuria)   - Started on rocephin. DC on Cefpodoxime for 1 more day   - Urine cx:pending     7. Continue current management for hyperlipidemia and postherpetic neuralgia    8. Constipation   - Start laxative . DC on miralax as needed it 82 y/o woman with PMH of HTN, DM on insulin, asthma, ESRD on HD M/W/F and PSH of JUDAH and cholecystectomy presented to the ED for high sugars associated with polyuria.     1. DM-II  with Hyperglycemia due to non-compliance resolving   * pt ran out of her pioglitazone 15mg po daily   - Admit to medicine   - s/p insulin IV and now on insulin SQ (lantus and lispro)  - Cont atorvastatin 40mg HS   - HgbA1C:11.7  - Endocrine consult appreciated  - DC on pioglitazone 30mg po HS  - DC on Ozempic 0.5mg qweek for 1 month then 1mg qweek   - DC on  Insulin-toujeo max solostar 60 units daily, can reduce dosage if hypoglycemic.. no rapid acting insulin at discharge ( I will dc on 50 u instead)   - PT eval appreciated       2. ESRD on HD (M/W/F) - s/p HD today with 2L removed  renal consult appreciated  check Phos level    3. HTN - on valsartan and amlodipine    4. Asthma - stable- on symbicort and albuterol prn    5. Hyponatremia - should improve with better DM control and fluid removal with HD    6. Possible acute UTI (polyuria)   - Started on rocephin. DC on Cefpodoxime for 1 more day   - Urine cx:pending     7. Continue current management for hyperlipidemia and postherpetic neuralgia    8. Constipation   - Start laxative . DC on miralax as needed it

## 2023-01-27 NOTE — PROGRESS NOTE ADULT - SUBJECTIVE AND OBJECTIVE BOX
[facility] NEPHROLOGY FOLLOW UP NOTE  --------------------------------------------------------------------------------  24 hour events/subjective: Patient examined during HD. Appears comfortable.    PAST HISTORY  --------------------------------------------------------------------------------  No significant changes to PMH, PSH, FHx, SHx, unless otherwise noted    ALLERGIES & MEDICATIONS  --------------------------------------------------------------------------------  Allergies    No Known Allergies    Standing Inpatient Medications  amLODIPine   Tablet 5 milliGRAM(s) Oral daily  aspirin  chewable 81 milliGRAM(s) Oral daily  atorvastatin 40 milliGRAM(s) Oral at bedtime  brimonidine 0.2% Ophthalmic Solution 1 Drop(s) Both EYES every 12 hours  budesonide 160 MICROgram(s)/formoterol 4.5 MICROgram(s) Inhaler 2 Puff(s) Inhalation two times a day  cefTRIAXone   IVPB 1000 milliGRAM(s) IV Intermittent every 24 hours  epoetin shannon-epbx (RETACRIT) Injectable 3000 Unit(s) IV Push <User Schedule>  gabapentin 100 milliGRAM(s) Oral every 8 hours  heparin   Injectable 5000 Unit(s) SubCutaneous every 12 hours  insulin glargine Injectable (LANTUS) 27 Unit(s) SubCutaneous at bedtime  insulin lispro (ADMELOG) corrective regimen sliding scale   SubCutaneous three times a day before meals  insulin lispro Injectable (ADMELOG) 8 Unit(s) SubCutaneous three times a day before meals  latanoprost 0.005% Ophthalmic Solution 1 Drop(s) Both EYES at bedtime  polyethylene glycol 3350 17 Gram(s) Oral daily  valsartan 160 milliGRAM(s) Oral daily    PRN Inpatient Medications  acetaminophen     Tablet .. 650 milliGRAM(s) Oral every 6 hours PRN  albuterol    90 MICROgram(s) HFA Inhaler 1 Puff(s) Inhalation every 4 hours PRN  melatonin 3 milliGRAM(s) Oral at bedtime PRN  simethicone 80 milliGRAM(s) Chew daily PRN    VITALS/PHYSICAL EXAM  --------------------------------------------------------------------------------  T(C): 36.5 (01-27-23 @ 07:45), Max: 36.9 (01-27-23 @ 00:50)  HR: 75 (01-27-23 @ 14:45) (65 - 78)  BP: 140/56 (01-27-23 @ 14:45) (98/47 - 169/70)  RR: 19 (01-27-23 @ 14:45) (18 - 19)  SpO2: 97% (01-27-23 @ 07:45) (97% - 100%)    01-27-23 @ 07:01  -  01-27-23 @ 16:25  --------------------------------------------------------  IN: 0 mL / OUT: 1500 mL / NET: -1500 mL    Physical Exam:  	Gen: NAD  	Pulm: CTA B/L  	CV: RRR, S1S2  	Abd: +BS, soft, nontender/nondistended  	: No suprapubic tenderness  	LE: Warm, no edema  	Vascular access: AVF    LABS/STUDIES  --------------------------------------------------------------------------------              10.1   7.62  >-----------<  228      [01-27-23 @ 07:00]              30.6     134  |  94  |  61  ----------------------------<  234      [01-27-23 @ 07:00]  4.2   |  27  |  6.2        Ca     8.0     [01-27-23 @ 07:00]      Mg     1.8     [01-27-23 @ 07:00]      Phos  4.2     [01-27-23 @ 07:00]    TPro  6.0  /  Alb  3.1  /  TBili  0.3  /  DBili  x   /  AST  14  /  ALT  7   /  AlkPhos  121  [01-27-23 @ 07:00]    Creatinine Trend:  SCr 6.2 [01-27 @ 07:00]  SCr 5.5 [01-26 @ 12:13]  SCr 5.5 [01-24 @ 22:35]    Urinalysis - [01-25-23 @ 03:00]      Color Light Yellow / Appearance Slightly Turbid / SG 1.010 / pH 7.5      Gluc >= 1000 mg/dL / Ketone Negative  / Bili Negative / Urobili <2 mg/dL       Blood Small / Protein 100 mg/dL / Leuk Est Large / Nitrite Negative      RBC 10 /  / Hyaline 0 / Gran  / Sq Epi  / Non Sq Epi 2 / Bacteria Few    HbA1c 8.3      [04-13-18 @ 06:02]

## 2023-01-27 NOTE — PHYSICAL THERAPY INITIAL EVALUATION ADULT - PERTINENT HX OF CURRENT PROBLEM, REHAB EVAL
82 y/o female with PMH of HTN, DM, asthma, ESRD on HD MWF, JUDAH, cholecystectomy, non-smoker presents to ED for high sugars associated with polyuria. Patient ran out of her pioglitazone a week ago and is not compliant with her insulin. Patient also complaining of dizziness and weakness not being able to stand. Patient denies any fevers, chills, headaches, chest pain, palpitations, sob, cough, abd pain, dysuria, le swelling.     In ED vitals: T 97.1, , /74, RR 15, Spo2 100% on RA   CT head unremarkable, CXR negative. Labs significant for glucose 646, Cr 5.5, BUN 48, trop 0.02, BetaOH 0.4, UA pos large LE, , few bacteria.   Patient given IVF and insulin in the ED

## 2023-01-30 LAB
CULTURE RESULTS: SIGNIFICANT CHANGE UP
CULTURE RESULTS: SIGNIFICANT CHANGE UP
SPECIMEN SOURCE: SIGNIFICANT CHANGE UP
SPECIMEN SOURCE: SIGNIFICANT CHANGE UP

## 2023-02-01 DIAGNOSIS — E86.0 DEHYDRATION: ICD-10-CM

## 2023-02-01 DIAGNOSIS — H40.9 UNSPECIFIED GLAUCOMA: ICD-10-CM

## 2023-02-01 DIAGNOSIS — E87.1 HYPO-OSMOLALITY AND HYPONATREMIA: ICD-10-CM

## 2023-02-01 DIAGNOSIS — J45.909 UNSPECIFIED ASTHMA, UNCOMPLICATED: ICD-10-CM

## 2023-02-01 DIAGNOSIS — Z99.2 DEPENDENCE ON RENAL DIALYSIS: ICD-10-CM

## 2023-02-01 DIAGNOSIS — E83.39 OTHER DISORDERS OF PHOSPHORUS METABOLISM: ICD-10-CM

## 2023-02-01 DIAGNOSIS — I12.0 HYPERTENSIVE CHRONIC KIDNEY DISEASE WITH STAGE 5 CHRONIC KIDNEY DISEASE OR END STAGE RENAL DISEASE: ICD-10-CM

## 2023-02-01 DIAGNOSIS — Z90.710 ACQUIRED ABSENCE OF BOTH CERVIX AND UTERUS: ICD-10-CM

## 2023-02-01 DIAGNOSIS — Z79.4 LONG TERM (CURRENT) USE OF INSULIN: ICD-10-CM

## 2023-02-01 DIAGNOSIS — K59.00 CONSTIPATION, UNSPECIFIED: ICD-10-CM

## 2023-02-01 DIAGNOSIS — Z91.14 PATIENT'S OTHER NONCOMPLIANCE WITH MEDICATION REGIMEN: ICD-10-CM

## 2023-02-01 DIAGNOSIS — Z79.84 LONG TERM (CURRENT) USE OF ORAL HYPOGLYCEMIC DRUGS: ICD-10-CM

## 2023-02-01 DIAGNOSIS — N18.6 END STAGE RENAL DISEASE: ICD-10-CM

## 2023-02-01 DIAGNOSIS — Z90.49 ACQUIRED ABSENCE OF OTHER SPECIFIED PARTS OF DIGESTIVE TRACT: ICD-10-CM

## 2023-02-01 DIAGNOSIS — E11.65 TYPE 2 DIABETES MELLITUS WITH HYPERGLYCEMIA: ICD-10-CM

## 2023-02-01 DIAGNOSIS — E11.22 TYPE 2 DIABETES MELLITUS WITH DIABETIC CHRONIC KIDNEY DISEASE: ICD-10-CM

## 2023-02-01 DIAGNOSIS — D63.1 ANEMIA IN CHRONIC KIDNEY DISEASE: ICD-10-CM

## 2023-02-01 DIAGNOSIS — R35.89 OTHER POLYURIA: ICD-10-CM

## 2023-02-01 DIAGNOSIS — E78.5 HYPERLIPIDEMIA, UNSPECIFIED: ICD-10-CM

## 2023-02-01 DIAGNOSIS — B02.29 OTHER POSTHERPETIC NERVOUS SYSTEM INVOLVEMENT: ICD-10-CM

## 2023-02-04 ENCOUNTER — INPATIENT (INPATIENT)
Facility: HOSPITAL | Age: 83
LOS: 0 days | Discharge: HOME | DRG: 638 | End: 2023-02-05
Attending: INTERNAL MEDICINE | Admitting: INTERNAL MEDICINE
Payer: MEDICARE

## 2023-02-04 VITALS
TEMPERATURE: 99 F | OXYGEN SATURATION: 100 % | HEART RATE: 85 BPM | WEIGHT: 186.95 LBS | SYSTOLIC BLOOD PRESSURE: 171 MMHG | HEIGHT: 64 IN | RESPIRATION RATE: 18 BRPM | DIASTOLIC BLOOD PRESSURE: 88 MMHG

## 2023-02-04 LAB
ALBUMIN SERPL ELPH-MCNC: 3.8 G/DL — SIGNIFICANT CHANGE UP (ref 3.5–5.2)
ALP SERPL-CCNC: 130 U/L — HIGH (ref 30–115)
ALT FLD-CCNC: 11 U/L — SIGNIFICANT CHANGE UP (ref 0–41)
ANION GAP SERPL CALC-SCNC: 20 MMOL/L — HIGH (ref 7–14)
APPEARANCE UR: ABNORMAL
AST SERPL-CCNC: 28 U/L — SIGNIFICANT CHANGE UP (ref 0–41)
B-OH-BUTYR SERPL-SCNC: <0.2 MMOL/L — SIGNIFICANT CHANGE UP
BACTERIA # UR AUTO: ABNORMAL
BASE EXCESS BLDV CALC-SCNC: -2.9 MMOL/L — LOW (ref -2–3)
BASE EXCESS BLDV CALC-SCNC: -6.9 MMOL/L — LOW (ref -2–3)
BASOPHILS # BLD AUTO: 0.02 K/UL — SIGNIFICANT CHANGE UP (ref 0–0.2)
BASOPHILS NFR BLD AUTO: 0.3 % — SIGNIFICANT CHANGE UP (ref 0–1)
BILIRUB SERPL-MCNC: <0.2 MG/DL — SIGNIFICANT CHANGE UP (ref 0.2–1.2)
BILIRUB UR-MCNC: NEGATIVE — SIGNIFICANT CHANGE UP
BUN SERPL-MCNC: 73 MG/DL — CRITICAL HIGH (ref 10–20)
CA-I SERPL-SCNC: 1.15 MMOL/L — SIGNIFICANT CHANGE UP (ref 1.15–1.33)
CA-I SERPL-SCNC: 1.18 MMOL/L — SIGNIFICANT CHANGE UP (ref 1.15–1.33)
CALCIUM SERPL-MCNC: 8.6 MG/DL — SIGNIFICANT CHANGE UP (ref 8.4–10.5)
CHLORIDE SERPL-SCNC: 101 MMOL/L — SIGNIFICANT CHANGE UP (ref 98–110)
CO2 SERPL-SCNC: 18 MMOL/L — SIGNIFICANT CHANGE UP (ref 17–32)
COLOR SPEC: YELLOW — SIGNIFICANT CHANGE UP
CREAT SERPL-MCNC: 6.2 MG/DL — CRITICAL HIGH (ref 0.7–1.5)
DIFF PNL FLD: ABNORMAL
EGFR: 6 ML/MIN/1.73M2 — LOW
EOSINOPHIL # BLD AUTO: 0.01 K/UL — SIGNIFICANT CHANGE UP (ref 0–0.7)
EOSINOPHIL NFR BLD AUTO: 0.1 % — SIGNIFICANT CHANGE UP (ref 0–8)
EPI CELLS # UR: 5 /HPF — SIGNIFICANT CHANGE UP (ref 0–5)
GAS PNL BLDV: 131 MMOL/L — LOW (ref 136–145)
GAS PNL BLDV: 135 MMOL/L — LOW (ref 136–145)
GAS PNL BLDV: SIGNIFICANT CHANGE UP
GLUCOSE BLDC GLUCOMTR-MCNC: 168 MG/DL — HIGH (ref 70–99)
GLUCOSE BLDC GLUCOMTR-MCNC: 223 MG/DL — HIGH (ref 70–99)
GLUCOSE BLDC GLUCOMTR-MCNC: 67 MG/DL — LOW (ref 70–99)
GLUCOSE SERPL-MCNC: 255 MG/DL — HIGH (ref 70–99)
GLUCOSE UR QL: ABNORMAL
HCO3 BLDV-SCNC: 19 MMOL/L — LOW (ref 22–29)
HCO3 BLDV-SCNC: 23 MMOL/L — SIGNIFICANT CHANGE UP (ref 22–29)
HCT VFR BLD CALC: 31.8 % — LOW (ref 37–47)
HCT VFR BLDA CALC: 24 % — LOW (ref 39–51)
HCT VFR BLDA CALC: 29 % — LOW (ref 39–51)
HGB BLD CALC-MCNC: 8 G/DL — LOW (ref 12.6–17.4)
HGB BLD CALC-MCNC: 9.5 G/DL — LOW (ref 12.6–17.4)
HGB BLD-MCNC: 9.9 G/DL — LOW (ref 12–16)
HYALINE CASTS # UR AUTO: 3 /LPF — SIGNIFICANT CHANGE UP (ref 0–7)
IMM GRANULOCYTES NFR BLD AUTO: 0.8 % — HIGH (ref 0.1–0.3)
KETONES UR-MCNC: NEGATIVE — SIGNIFICANT CHANGE UP
LACTATE BLDV-MCNC: 1.9 MMOL/L — SIGNIFICANT CHANGE UP (ref 0.5–2)
LACTATE BLDV-MCNC: 4.9 MMOL/L — CRITICAL HIGH (ref 0.5–2)
LACTATE SERPL-SCNC: 2.1 MMOL/L — HIGH (ref 0.7–2)
LEUKOCYTE ESTERASE UR-ACNC: ABNORMAL
LYMPHOCYTES # BLD AUTO: 0.98 K/UL — LOW (ref 1.2–3.4)
LYMPHOCYTES # BLD AUTO: 12.6 % — LOW (ref 20.5–51.1)
MCHC RBC-ENTMCNC: 30.2 PG — SIGNIFICANT CHANGE UP (ref 27–31)
MCHC RBC-ENTMCNC: 31.1 G/DL — LOW (ref 32–37)
MCV RBC AUTO: 97 FL — SIGNIFICANT CHANGE UP (ref 81–99)
MONOCYTES # BLD AUTO: 0.47 K/UL — SIGNIFICANT CHANGE UP (ref 0.1–0.6)
MONOCYTES NFR BLD AUTO: 6 % — SIGNIFICANT CHANGE UP (ref 1.7–9.3)
NEUTROPHILS # BLD AUTO: 6.24 K/UL — SIGNIFICANT CHANGE UP (ref 1.4–6.5)
NEUTROPHILS NFR BLD AUTO: 80.2 % — HIGH (ref 42.2–75.2)
NITRITE UR-MCNC: NEGATIVE — SIGNIFICANT CHANGE UP
NRBC # BLD: 0 /100 WBCS — SIGNIFICANT CHANGE UP (ref 0–0)
PCO2 BLDV: 41 MMHG — SIGNIFICANT CHANGE UP (ref 39–42)
PCO2 BLDV: 42 MMHG — SIGNIFICANT CHANGE UP (ref 39–42)
PH BLDV: 7.28 — LOW (ref 7.32–7.43)
PH BLDV: 7.34 — SIGNIFICANT CHANGE UP (ref 7.32–7.43)
PH UR: 7.5 — SIGNIFICANT CHANGE UP (ref 5–8)
PLATELET # BLD AUTO: 309 K/UL — SIGNIFICANT CHANGE UP (ref 130–400)
PO2 BLDV: 44 MMHG — SIGNIFICANT CHANGE UP
PO2 BLDV: 64 MMHG — SIGNIFICANT CHANGE UP
POTASSIUM BLDV-SCNC: 4.8 MMOL/L — SIGNIFICANT CHANGE UP (ref 3.5–5.1)
POTASSIUM BLDV-SCNC: 4.9 MMOL/L — SIGNIFICANT CHANGE UP (ref 3.5–5.1)
POTASSIUM SERPL-MCNC: 5.9 MMOL/L — HIGH (ref 3.5–5)
POTASSIUM SERPL-SCNC: 5.9 MMOL/L — HIGH (ref 3.5–5)
PROT SERPL-MCNC: 7.3 G/DL — SIGNIFICANT CHANGE UP (ref 6–8)
PROT UR-MCNC: ABNORMAL
RBC # BLD: 3.28 M/UL — LOW (ref 4.2–5.4)
RBC # FLD: 15.5 % — HIGH (ref 11.5–14.5)
RBC CASTS # UR COMP ASSIST: 23 /HPF — HIGH (ref 0–4)
SAO2 % BLDV: 69.9 % — SIGNIFICANT CHANGE UP
SAO2 % BLDV: 93 % — SIGNIFICANT CHANGE UP
SODIUM SERPL-SCNC: 139 MMOL/L — SIGNIFICANT CHANGE UP (ref 135–146)
SP GR SPEC: 1.01 — SIGNIFICANT CHANGE UP (ref 1.01–1.03)
UROBILINOGEN FLD QL: SIGNIFICANT CHANGE UP
WBC # BLD: 7.78 K/UL — SIGNIFICANT CHANGE UP (ref 4.8–10.8)
WBC # FLD AUTO: 7.78 K/UL — SIGNIFICANT CHANGE UP (ref 4.8–10.8)
WBC UR QL: 241 /HPF — HIGH (ref 0–5)

## 2023-02-04 PROCEDURE — 84295 ASSAY OF SERUM SODIUM: CPT

## 2023-02-04 PROCEDURE — 83605 ASSAY OF LACTIC ACID: CPT

## 2023-02-04 PROCEDURE — 85018 HEMOGLOBIN: CPT

## 2023-02-04 PROCEDURE — 87040 BLOOD CULTURE FOR BACTERIA: CPT

## 2023-02-04 PROCEDURE — 93010 ELECTROCARDIOGRAM REPORT: CPT

## 2023-02-04 PROCEDURE — 99285 EMERGENCY DEPT VISIT HI MDM: CPT | Mod: 25

## 2023-02-04 PROCEDURE — U0005: CPT

## 2023-02-04 PROCEDURE — 80053 COMPREHEN METABOLIC PANEL: CPT

## 2023-02-04 PROCEDURE — 85025 COMPLETE CBC W/AUTO DIFF WBC: CPT

## 2023-02-04 PROCEDURE — 82803 BLOOD GASES ANY COMBINATION: CPT

## 2023-02-04 PROCEDURE — 85014 HEMATOCRIT: CPT

## 2023-02-04 PROCEDURE — 71045 X-RAY EXAM CHEST 1 VIEW: CPT | Mod: 26

## 2023-02-04 PROCEDURE — 94640 AIRWAY INHALATION TREATMENT: CPT

## 2023-02-04 PROCEDURE — 81001 URINALYSIS AUTO W/SCOPE: CPT

## 2023-02-04 PROCEDURE — 93005 ELECTROCARDIOGRAM TRACING: CPT

## 2023-02-04 PROCEDURE — 82962 GLUCOSE BLOOD TEST: CPT

## 2023-02-04 PROCEDURE — U0003: CPT

## 2023-02-04 PROCEDURE — 96374 THER/PROPH/DIAG INJ IV PUSH: CPT

## 2023-02-04 PROCEDURE — 99223 1ST HOSP IP/OBS HIGH 75: CPT

## 2023-02-04 PROCEDURE — 87086 URINE CULTURE/COLONY COUNT: CPT

## 2023-02-04 PROCEDURE — 96375 TX/PRO/DX INJ NEW DRUG ADDON: CPT

## 2023-02-04 PROCEDURE — 82010 KETONE BODYS QUAN: CPT

## 2023-02-04 PROCEDURE — 71045 X-RAY EXAM CHEST 1 VIEW: CPT

## 2023-02-04 PROCEDURE — 90935 HEMODIALYSIS ONE EVALUATION: CPT

## 2023-02-04 PROCEDURE — 82330 ASSAY OF CALCIUM: CPT

## 2023-02-04 PROCEDURE — 84132 ASSAY OF SERUM POTASSIUM: CPT

## 2023-02-04 PROCEDURE — 36415 COLL VENOUS BLD VENIPUNCTURE: CPT

## 2023-02-04 PROCEDURE — 99285 EMERGENCY DEPT VISIT HI MDM: CPT

## 2023-02-04 RX ORDER — SODIUM CHLORIDE 9 MG/ML
1000 INJECTION, SOLUTION INTRAVENOUS
Refills: 0 | Status: DISCONTINUED | OUTPATIENT
Start: 2023-02-04 | End: 2023-02-05

## 2023-02-04 RX ORDER — VALSARTAN 80 MG/1
160 TABLET ORAL DAILY
Refills: 0 | Status: DISCONTINUED | OUTPATIENT
Start: 2023-02-04 | End: 2023-02-05

## 2023-02-04 RX ORDER — INSULIN GLARGINE 100 [IU]/ML
24 INJECTION, SOLUTION SUBCUTANEOUS AT BEDTIME
Refills: 0 | Status: DISCONTINUED | OUTPATIENT
Start: 2023-02-04 | End: 2023-02-04

## 2023-02-04 RX ORDER — GLUCAGON INJECTION, SOLUTION 0.5 MG/.1ML
1 INJECTION, SOLUTION SUBCUTANEOUS ONCE
Refills: 0 | Status: DISCONTINUED | OUTPATIENT
Start: 2023-02-04 | End: 2023-02-05

## 2023-02-04 RX ORDER — INSULIN LISPRO 100/ML
8 VIAL (ML) SUBCUTANEOUS
Refills: 0 | Status: DISCONTINUED | OUTPATIENT
Start: 2023-02-04 | End: 2023-02-04

## 2023-02-04 RX ORDER — AMLODIPINE BESYLATE 2.5 MG/1
5 TABLET ORAL DAILY
Refills: 0 | Status: DISCONTINUED | OUTPATIENT
Start: 2023-02-04 | End: 2023-02-05

## 2023-02-04 RX ORDER — HEPARIN SODIUM 5000 [USP'U]/ML
5000 INJECTION INTRAVENOUS; SUBCUTANEOUS EVERY 8 HOURS
Refills: 0 | Status: DISCONTINUED | OUTPATIENT
Start: 2023-02-04 | End: 2023-02-05

## 2023-02-04 RX ORDER — POLYETHYLENE GLYCOL 3350 17 G/17G
17 POWDER, FOR SOLUTION ORAL DAILY
Refills: 0 | Status: DISCONTINUED | OUTPATIENT
Start: 2023-02-04 | End: 2023-02-05

## 2023-02-04 RX ORDER — ACETAMINOPHEN 500 MG
650 TABLET ORAL EVERY 6 HOURS
Refills: 0 | Status: DISCONTINUED | OUTPATIENT
Start: 2023-02-04 | End: 2023-02-05

## 2023-02-04 RX ORDER — ATORVASTATIN CALCIUM 80 MG/1
40 TABLET, FILM COATED ORAL AT BEDTIME
Refills: 0 | Status: DISCONTINUED | OUTPATIENT
Start: 2023-02-04 | End: 2023-02-05

## 2023-02-04 RX ORDER — INSULIN LISPRO 100/ML
VIAL (ML) SUBCUTANEOUS
Refills: 0 | Status: DISCONTINUED | OUTPATIENT
Start: 2023-02-04 | End: 2023-02-05

## 2023-02-04 RX ORDER — DEXTROSE 50 % IN WATER 50 %
15 SYRINGE (ML) INTRAVENOUS ONCE
Refills: 0 | Status: DISCONTINUED | OUTPATIENT
Start: 2023-02-04 | End: 2023-02-05

## 2023-02-04 RX ORDER — DEXTROSE 50 % IN WATER 50 %
12.5 SYRINGE (ML) INTRAVENOUS ONCE
Refills: 0 | Status: DISCONTINUED | OUTPATIENT
Start: 2023-02-04 | End: 2023-02-05

## 2023-02-04 RX ORDER — BUDESONIDE AND FORMOTEROL FUMARATE DIHYDRATE 160; 4.5 UG/1; UG/1
2 AEROSOL RESPIRATORY (INHALATION)
Refills: 0 | Status: DISCONTINUED | OUTPATIENT
Start: 2023-02-04 | End: 2023-02-05

## 2023-02-04 RX ORDER — DEXTROSE 50 % IN WATER 50 %
25 SYRINGE (ML) INTRAVENOUS ONCE
Refills: 0 | Status: DISCONTINUED | OUTPATIENT
Start: 2023-02-04 | End: 2023-02-05

## 2023-02-04 RX ORDER — CALCIUM ACETATE 667 MG
1334 TABLET ORAL
Refills: 0 | Status: DISCONTINUED | OUTPATIENT
Start: 2023-02-04 | End: 2023-02-04

## 2023-02-04 RX ORDER — ALBUTEROL 90 UG/1
1 AEROSOL, METERED ORAL EVERY 4 HOURS
Refills: 0 | Status: DISCONTINUED | OUTPATIENT
Start: 2023-02-04 | End: 2023-02-05

## 2023-02-04 RX ORDER — CALCIUM ACETATE 667 MG
1334 TABLET ORAL
Refills: 0 | Status: DISCONTINUED | OUTPATIENT
Start: 2023-02-04 | End: 2023-02-05

## 2023-02-04 RX ORDER — MAGNESIUM SULFATE 500 MG/ML
2 VIAL (ML) INJECTION ONCE
Refills: 0 | Status: COMPLETED | OUTPATIENT
Start: 2023-02-04 | End: 2023-02-04

## 2023-02-04 RX ORDER — LATANOPROST 0.05 MG/ML
1 SOLUTION/ DROPS OPHTHALMIC; TOPICAL AT BEDTIME
Refills: 0 | Status: DISCONTINUED | OUTPATIENT
Start: 2023-02-04 | End: 2023-02-05

## 2023-02-04 RX ORDER — BRIMONIDINE TARTRATE 2 MG/MG
1 SOLUTION/ DROPS OPHTHALMIC
Refills: 0 | Status: DISCONTINUED | OUTPATIENT
Start: 2023-02-04 | End: 2023-02-05

## 2023-02-04 RX ORDER — CEFTRIAXONE 500 MG/1
1000 INJECTION, POWDER, FOR SOLUTION INTRAMUSCULAR; INTRAVENOUS ONCE
Refills: 0 | Status: COMPLETED | OUTPATIENT
Start: 2023-02-04 | End: 2023-02-04

## 2023-02-04 RX ORDER — ASPIRIN/CALCIUM CARB/MAGNESIUM 324 MG
81 TABLET ORAL DAILY
Refills: 0 | Status: DISCONTINUED | OUTPATIENT
Start: 2023-02-04 | End: 2023-02-05

## 2023-02-04 RX ORDER — LANOLIN ALCOHOL/MO/W.PET/CERES
3 CREAM (GRAM) TOPICAL AT BEDTIME
Refills: 0 | Status: DISCONTINUED | OUTPATIENT
Start: 2023-02-04 | End: 2023-02-05

## 2023-02-04 RX ORDER — INSULIN LISPRO 100/ML
VIAL (ML) SUBCUTANEOUS
Refills: 0 | Status: DISCONTINUED | OUTPATIENT
Start: 2023-02-04 | End: 2023-02-04

## 2023-02-04 RX ADMIN — BRIMONIDINE TARTRATE 1 DROP(S): 2 SOLUTION/ DROPS OPHTHALMIC at 19:00

## 2023-02-04 RX ADMIN — Medication 667 MILLIGRAM(S): at 18:58

## 2023-02-04 RX ADMIN — HEPARIN SODIUM 5000 UNIT(S): 5000 INJECTION INTRAVENOUS; SUBCUTANEOUS at 15:00

## 2023-02-04 RX ADMIN — HEPARIN SODIUM 5000 UNIT(S): 5000 INJECTION INTRAVENOUS; SUBCUTANEOUS at 21:18

## 2023-02-04 RX ADMIN — ATORVASTATIN CALCIUM 40 MILLIGRAM(S): 80 TABLET, FILM COATED ORAL at 21:18

## 2023-02-04 RX ADMIN — CEFTRIAXONE 100 MILLIGRAM(S): 500 INJECTION, POWDER, FOR SOLUTION INTRAMUSCULAR; INTRAVENOUS at 10:34

## 2023-02-04 RX ADMIN — Medication 25 GRAM(S): at 09:37

## 2023-02-04 RX ADMIN — LATANOPROST 1 DROP(S): 0.05 SOLUTION/ DROPS OPHTHALMIC; TOPICAL at 21:18

## 2023-02-04 NOTE — H&P ADULT - ATTENDING COMMENTS
Patient seen and examined at bedside independently of the residents. I read the resident's note and agree with the plan with the additions and corrections as noted below.    REVIEW OF SYSTEMS:  CONSTITUTIONAL: No weakness, fevers or chills. + lightheadedness.   EYES/ENT: No visual changes;  No vertigo or throat pain   NECK: No pain or stiffness  RESPIRATORY: No cough, wheezing, hemoptysis; No shortness of breath  CARDIOVASCULAR: No chest pain or palpitations  GASTROINTESTINAL: No abdominal or epigastric pain. No nausea, vomiting, or hematemesis; No diarrhea or constipation. No melena or hematochezia.  GENITOURINARY: No dysuria, frequency or hematuria  NEUROLOGICAL: No numbness or weakness  MSK: No pain. No weakness.   SKIN: No itching, rashes.     PMH:  HTN, DM on insulin, asthma, ESRD on HD M/W/F     FHx: Reviewed. No fhx of asthma/copd, No fhx of liver and pulmonary disease. No fhx of hematological disorder.     Physical Exam:  GEN: No acute distress. Awake, Alert and oriented x 3.   Head: Atraumatic, Normocephalic.   Eye: PEERLA. No sclera icterus. EOMI.   ENT: Normal oropharynx, no thyromegaly, no mass, no lymphadenopathy.   LUNGS: Clear to auscultation bilaterally. No wheeze/rales/crackles.   HEART: Normal. S1/S2 present. RRR. No murmur/gallops.   ABD: Soft, non-tender, non-distended. Bowel sounds present.   EXT: No pitting edema. No erythema. No tenderness.  Integumentary: No rash, No sore, No petechia.   NEURO: CN III-XII intact. Strength: 5/5 b/l ULE. Sensory intact b/l ULE.     Vital Signs Last 24 Hrs  T(C): 37 (2023 05:45), Max: 37 (2023 05:45)  T(F): 98.6 (2023 05:45), Max: 98.6 (2023 05:45)  HR: 88 (2023 14:55) (82 - 90)  BP: 112/57 (2023 14:55) (112/57 - 171/88)  BP(mean): --  RR: 18 (2023 14:55) (18 - 20)  SpO2: 100% (2023 11:02) (100% - 100%)    Parameters below as of 2023 14:55  Patient On (Oxygen Delivery Method): room air    I&O's Summary    2023 07:01  -  2023 19:53  --------------------------------------------------------  IN: 0 mL / OUT: 1700 mL / NET: -1700 mL        Please see the above notes for Labs and radiology.     Assessment and Plan:     80 yo F with hx of HTN, DM on insulin, asthma, ESRD on HD M/W/F and PSH of JUDAH and cholecystectomy presented to the ED for hypoglycemic episode.     # Symptomatic Hypoglycemia   # Poorly controlled DM II   - Hold home anti-hyperglycemic agents. c/w insulin sliding scale for now.   - check HbA1c  - Endocrine consult.     # Fluid overload likely 2/2 missed HD session  - s/p HD session today.   - No hypoxia.   - monitor electrolytes and correct prn  - c/w home med  - f/u Nephrology.     # Asymptomatic bacteruria   - monitor off abx for now.     # HTN/HLD - c/w home med  # Asthma - not in exacerbation. c/w home inhalers.     DVT ppx: heparin SC  GI ppx:  not indicated.   Diet: Renal/DASH   Activity: as tolerated.     Date seen by the attendin2023  Total time spent: 75 minutes. Patient seen and examined at bedside independently of the residents. I read the resident's note and agree with the plan with the additions and corrections as noted below.    REVIEW OF SYSTEMS:  CONSTITUTIONAL: No weakness, fevers or chills. + lightheadedness.   EYES/ENT: No visual changes;  No vertigo or throat pain   NECK: No pain or stiffness  RESPIRATORY: No cough, wheezing, hemoptysis; No shortness of breath  CARDIOVASCULAR: No chest pain or palpitations  GASTROINTESTINAL: No abdominal or epigastric pain. No nausea, vomiting, or hematemesis; No diarrhea or constipation. No melena or hematochezia.  GENITOURINARY: No dysuria, frequency or hematuria  NEUROLOGICAL: No numbness or weakness  MSK: No pain. No weakness.   SKIN: No itching, rashes.     PMH:  HTN, DM on insulin, asthma, ESRD on HD M/W/F     FHx: Reviewed. No fhx of asthma/copd, No fhx of liver and pulmonary disease. No fhx of hematological disorder.     Physical Exam:  GEN: No acute distress. Awake, Alert and oriented x 3.   Head: Atraumatic, Normocephalic.   Eye: PEERLA. No sclera icterus. EOMI.   ENT: Normal oropharynx, no thyromegaly, no mass, no lymphadenopathy.   LUNGS: Clear to auscultation bilaterally. No wheeze/rales/crackles.   HEART: Normal. S1/S2 present. RRR. No murmur/gallops.   ABD: Soft, non-tender, non-distended. Bowel sounds present.   EXT: No pitting edema. No erythema. No tenderness.  Integumentary: No rash, No sore, No petechia.   NEURO: CN III-XII intact. Strength: 5/5 b/l ULE. Sensory intact b/l ULE.     Vital Signs Last 24 Hrs  T(C): 37 (2023 05:45), Max: 37 (2023 05:45)  T(F): 98.6 (2023 05:45), Max: 98.6 (2023 05:45)  HR: 88 (2023 14:55) (82 - 90)  BP: 112/57 (2023 14:55) (112/57 - 171/88)  BP(mean): --  RR: 18 (2023 14:55) (18 - 20)  SpO2: 100% (2023 11:02) (100% - 100%)    Parameters below as of 2023 14:55  Patient On (Oxygen Delivery Method): room air    I&O's Summary    2023 07:01  -  2023 19:53  --------------------------------------------------------  IN: 0 mL / OUT: 1700 mL / NET: -1700 mL      Please see the above notes for Labs and radiology.     Assessment and Plan:     82 yo F with hx of HTN, DM on insulin, asthma, ESRD on HD M/W/F and PSH of JUDAH and cholecystectomy presented to the ED for hypoglycemic episode.     # Symptomatic Hypoglycemia   # Poorly controlled DM II   - Hold home anti-hyperglycemic agents. c/w insulin sliding scale for now.   - check HbA1c  - Endocrine consult.     # Fluid overload likely 2/2 missed HD session  - s/p HD session today.   - No hypoxia.   - monitor electrolytes and correct prn  - c/w home med  - f/u Nephrology.     # Asymptomatic bacteruria   - monitor off abx for now.     # HTN/HLD - c/w home med  # Asthma - not in exacerbation. c/w home inhalers.     DVT ppx: heparin SC  GI ppx:  not indicated.   Diet: Renal/DASH   Activity: as tolerated.     Date seen by the attendin2023  Total time spent: 75 minutes.

## 2023-02-04 NOTE — ED PROVIDER NOTE - PROGRESS NOTE DETAILS
s/o Dr. Hernandez, f/u and allison. PS: CBC clotted. Repeat CBC and CMP drawn, dropped off at lab BK: Nephrology consulted.

## 2023-02-04 NOTE — ED PROVIDER NOTE - OBJECTIVE STATEMENT
Pt is an 84 y/o female with PMH of ESRD on HD (M/W/F) with right AVF, DM on insulin, asthma and HTN BIBEMS for hypoglycemia. Pt reports she felt chills and sweats this morning while in bed. Checked her sugar and it was 59 so she drank juice and sugar was still 50. Pt reports she felt generalized weakness yesterday so she did not go to dialysis. Pt is an 84 y/o female with PMH of ESRD on HD (M/W/F) with right AVF, DM on insulin, asthma and HTN BIBEMS for hypoglycemia. Pt reports she felt chills and sweats this morning while in bed. Checked her sugar and it was 59 so she drank juice and sugar was still 50. Pt reports she felt generalized weakness yesterday so she did not go to dialysis. No fever, cough, abdominal pain or vomiting. Pt is an 84 y/o female with PMH of ESRD on HD (M/W/F) with right AVF, DM on insulin, asthma, HTN and recent admission for hypoglycemia (discharged 1/27) BIBEMS for hypoglycemia. Pt reports she felt chills and sweats this morning while in bed. Checked her sugar and it was 59 so she drank juice and sugar was still 50. Takes long acting insulin only (60 units daily) which pt says she takes around 2pm everyday. Not on any short acting insulin. Also takes ozempic and pioglitazone. Follows with endo Dr. Guerra. Pt reports she felt generalized weakness yesterday so she did not go to dialysis. No fever, cough, abdominal pain or vomiting.

## 2023-02-04 NOTE — H&P ADULT - HISTORY OF PRESENT ILLNESS
81F with PMHx of HTN, DM on insulin, asthma, ESRD on HD M/W/F and PSH of JUDAH and cholecystectomy presented to the ED for hypoglycemic episode. Pt was recently admitted for hyperglycemia and was discharged on lantus 60u, pioglitazone 30, and ozempic qweek. Earlier today she felt chills, mild tremors, and lightheadedness; checked her sugars and her FS was 50. She had apple juice which brought it up to 59 but symptoms persisted so she presented to the ED. Had an HD session yesterday that she missed because she felt generalized weakness, but has no other symptoms. Today patient denies any CP, SOB, palpitations, abd pain, N/V/C/D, or any  symptoms including dysuria or incontinence. Pt seen while undergoing HD.    In the ED: /88, HR 85, T 98F, RR 18 satting 100% on RA. Labs notable for chronic anemia (Hb ~9), K 5.9 hemolyzed, 4.8 on VBG, lactate 4.9 -> 1.9. Fingersticks 62 -> 306 -> 168 in ED. CXR mild volume overload but pt went to HD and asymptomatic on RA. UA + for Large LE, pyuria, and bacteria but patient has no symptoms.

## 2023-02-04 NOTE — H&P ADULT - NSHPPHYSICALEXAM_GEN_ALL_CORE
General: NAD, AOx3  HEENT: Normocephalic, atraumatic  Lungs: Normal breath sounds, no wheezes/crackles  Heart: S1s2, no mrg  Abdomen: Soft, NT/ND. No rigidity/guarding. No suprapubic tenderness  Extremities: Peripheral pulses +1, no cyanosis/edema  Neuro: Grossly normal motor exam, no focal deficits  Psych: AOx3, normal affect  Skin: No rashes or bruises

## 2023-02-04 NOTE — ED PROVIDER NOTE - CLINICAL SUMMARY MEDICAL DECISION MAKING FREE TEXT BOX
ccruz - pt signed out to me by Dr Mcdonough. p/w hypoglycemia. missed dialysis yesterday 2/2 gen weakness. recently started on new meds for diabetes. lives alone. FS improved. found to have uti, iv abx ordered. renal consult, admit to medicine. needs dialysis as inpt and meds adjusted, unsafe dc home.

## 2023-02-04 NOTE — ED ADULT NURSE NOTE - OBJECTIVE STATEMENT
Patient complains of feeling sweatiness and weakness, missed dialysis because she was not feeling well.

## 2023-02-04 NOTE — ED PROVIDER NOTE - PHYSICAL EXAMINATION
CONST: elderly female  HEAD:  normocephalic, atraumatic  EYES: PERRLA, conjunctivae without injection, drainage or discharge  ENMT:  nasal mucosa moist; mouth moist without ulcerations or lesions; throat moist without erythema, exudate, ulcerations or lesions  NECK:  supple, full ROM  CARDIAC:  regular rate and rhythm, normal S1 and S2, no murmurs, rubs or gallops  RESP:  respiratory rate and effort appear normal for age; lungs are clear to auscultation bilaterally; no rales or wheezes  ABDOMEN:  soft, nontender, nondistended  EXTREMITIES: + right AVF with palpable thrill  NEURO:  awake and alert, DARBY  SKIN:  no rash

## 2023-02-04 NOTE — ED ADULT TRIAGE NOTE - CHIEF COMPLAINT QUOTE
She lives alone, she was feeling sweaty and weak, she called her family and they called 911. She missed her dialysis yesterday. Her sugar was 59, we gave her three juices, tube of glucose and a sandwich, her sugar dropped to 50 afterwards - EMS   Patient reports feeling less sweaty and weak, missed dialysis because she was not feeling well.

## 2023-02-04 NOTE — H&P ADULT - ASSESSMENT
81F with PMHx of HTN, DM on insulin, asthma, ESRD on HD M/W/F and PSH of JUDAH and cholecystectomy presented to the ED for hypoglycemic episode. Pt was recently admitted for hyperglycemia and was discharged on lantus 60u, pioglitazone 30, and ozempic qweek. Earlier today she felt chills, mild tremors, and lightheadedness; checked her sugars and her FS was 50. She had apple juice which brought it up to 59 but symptoms persisted so she presented to the ED.    #Symptomatic Hypoglycemia   #Insulin-dependent DM  - Recent admission for hypoglycemia, discharged on Lantus 60u qd, Ozempic qweek, and pioglitazone 30mg qd  - Symptomatic hypoglycemia earlier today with FS 50 -> 59 after apple juice; pt had tremors, diaphoresis, and lightheadedness that resolved after improvement of FS  - FS 62 -> 306 -> 168 in ED  - A1c 11.7 in January  - c/w lantus 24, lispro 8 TID, and ISS for now  - Consider decreasing Pioglitazone to 15mg qd on discharge and adjusting insulin based on inpatient fingersticks  - Outpt f/u with Dr. Mendez    #ESRD on HD M/W/F  #Missed HD session yesterday  - Missed HD session yesterday due to generalized weakness  - Nephro consult Dr Garcia  - Pt underwent HD today, next HD Monday  - F/u Phos and BMP  - c/w calcium acetate    #Asymptomatic bacteruria  - UA +Large LE, Pyuria, and many bacteria  - Had +ve UA last admission recently, received rocephin and discharged on cefpodoxime   - UCx 1/25: 50-99k Coag -ve Staph  - s/p 1 dose rocephin in ED  - F/u UCx and BCx  - Monitor off abx for now as pt asymptomatic, afebrile, no leukocytosis    #HTN/HLD  - c/w amlodipine and valsartan, ASA, statin    #Asthma  - c/w symbicort and albuterol    DVT ppx: subq hep  GI ppx: N/A  Diet: Renal/DASH  Activity: IAt  Dispo: Acute, likely DC tomorrow 81F with PMHx of HTN, DM on insulin, asthma, ESRD on HD M/W/F and PSH of JUDAH and cholecystectomy presented to the ED for hypoglycemic episode. Pt was recently admitted for hyperglycemia and was discharged on lantus 60u, pioglitazone 30, and ozempic qweek. Earlier today she felt chills, mild tremors, and lightheadedness; checked her sugars and her FS was 50. She had apple juice which brought it up to 59 but symptoms persisted so she presented to the ED.    #Symptomatic Hypoglycemia   #Insulin-dependent DM  - Recent admission for hypoglycemia, discharged on Lantus 60u qd, Ozempic qweek, and pioglitazone 30mg qd  - Symptomatic hypoglycemia earlier today with FS 50 -> 59 after apple juice; pt had tremors, diaphoresis, and lightheadedness that resolved after improvement of FS  - FS 62 -> 306 -> 168 in ED  - A1c 11.7 in January  - c/w lantus 24, lispro 8 TID, and ISS for now  - Consider decreasing Pioglitazone to 15mg qd on discharge and adjusting insulin based on inpatient fingersticks  - F/u endo consult    #ESRD on HD M/W/F  #Missed HD session yesterday  - Missed HD session yesterday due to generalized weakness  - Nephro consult Dr Gacria  - Pt underwent HD today, next HD Monday  - F/u Phos and BMP  - c/w calcium acetate    #Asymptomatic bacteruria  - UA +Large LE, Pyuria, and many bacteria  - Had +ve UA last admission recently, received rocephin and discharged on cefpodoxime   - UCx 1/25: 50-99k Coag -ve Staph  - s/p 1 dose rocephin in ED  - F/u UCx and BCx  - Monitor off abx for now as pt asymptomatic, afebrile, no leukocytosis    #HTN/HLD  - c/w amlodipine and valsartan, ASA, statin    #Asthma  - c/w symbicort and albuterol    DVT ppx: subq hep  GI ppx: N/A  Diet: Renal/DASH  Activity: IAt  Dispo: Acute, likely DC tomorrow

## 2023-02-04 NOTE — ED PROVIDER NOTE - CARE PLAN
1 Principal Discharge DX:	Hypoglycemia  Secondary Diagnosis:	Elevated lactic acid level  Secondary Diagnosis:	ESRD on dialysis   Principal Discharge DX:	Hypoglycemia  Secondary Diagnosis:	Elevated lactic acid level  Secondary Diagnosis:	ESRD on dialysis  Secondary Diagnosis:	Acute UTI

## 2023-02-04 NOTE — H&P ADULT - NSHPLABSRESULTS_GEN_ALL_CORE
< from: Xray Chest 1 View- PORTABLE-Urgent (Xray Chest 1 View- PORTABLE-Urgent .) (02.04.23 @ 08:22) >    ACC: [id number] EXAM:  XR CHEST PORTABLE URGENT 1V   ORDERED BY: CESAR SORENSON     PROCEDURE DATE:  02/04/2023          INTERPRETATION:  Clinical History / Reason for exam: Lethargy    Comparison : Chest radiograph January 24, 2023.    Technique/Positioning: Frontal, low lung volumes.    Findings:    Support devices: None.    Cardiac/mediastinum/hilum: Cardiac magnification/cardiomegaly.    Lung parenchyma/Pleura: Within normal limits.    Skeleton/soft tissues: Stable.    Impression:    Cardiomegaly magnification/cardiomegaly        --- End of Report ---    < end of copied text >

## 2023-02-04 NOTE — ED ADULT NURSE NOTE - NS_SISCREENINGSR_GEN_ALL_ED
negative. Physical Exam  Vitals signs and nursing note reviewed. Constitutional:       Appearance: He is well-developed. HENT:      Head: Normocephalic and atraumatic. Eyes:      Conjunctiva/sclera: Conjunctivae normal.   Neck:      Musculoskeletal: Normal range of motion and neck supple. Cardiovascular:      Rate and Rhythm: Normal rate and regular rhythm. Heart sounds: Normal heart sounds. No murmur. Pulmonary:      Effort: Pulmonary effort is normal. No respiratory distress. Breath sounds: Normal breath sounds. No wheezing or rales. Abdominal:      General: Bowel sounds are normal.      Palpations: Abdomen is soft. Tenderness: There is abdominal tenderness in the right upper quadrant and left upper quadrant. There is no guarding or rebound. Negative signs include Guaman's sign. Musculoskeletal:         General: No tenderness or deformity. Skin:     General: Skin is warm and dry. Neurological:      Mental Status: He is alert and oriented to person, place, and time. Cranial Nerves: No cranial nerve deficit. Coordination: Coordination normal.          Procedures     MDM  Number of Diagnoses or Management Options  Abdominal pain, epigastric: new and requires workup  Other chest pain: new and requires workup  Diagnosis management comments: Patient is an 55-year-old male presents with complaint of chest pain and abdominal pain. Concern for possible ACS versus acute cholecystitis versus AAA. Patient with right upper quadrant pain on examination. Initial laboratory work-up did not demonstrate any acute abnormalities. Right upper quadrant ultrasound was obtained and demonstrated gallbladder polyp with no evidence of acute cholecystitis. Initial EKG did not demonstrate any acute ischemic changes and his troponin was within normal range.   Discussed with patient that we would like to obtain CT scan for further evaluation of abdominal pain and repeat troponin to evaluate possible ACS. However patient stated that he would like to go home at this time he would not like to proceed any further treatment. He notes that he will be able to follow-up with his primary care doctor tomorrow and if further imaging would be necessary here to obtain at this time. Once again discussed with patient that given symptom presentation a CT scan would be indicated in order to rule out intra-abdominal pathology including AAA and that if patient did in fact have significant intra-abdominal pathology that was undiagnosed may lead to worsened outcome and possible death. Once again patient voiced desire to leave. Patient was of sound mind and demonstrated adequate understanding of medical conditions and consequences of not proceeding with further evaluation. Patient was instructed that he may return to the emergency department anytime for further care. Patient was discharged AMA. Amount and/or Complexity of Data Reviewed  Clinical lab tests: ordered  Tests in the radiology section of CPT®: ordered  Tests in the medicine section of CPT®: ordered    Risk of Complications, Morbidity, and/or Mortality  Presenting problems: moderate  Diagnostic procedures: moderate  Management options: moderate    Patient Progress  Patient progress: other (comment)  Patient left AMA    --------------------------------------------- PAST HISTORY ---------------------------------------------  Past Medical History:  has a past medical history of Aortic valve regurgitation, Arrhythmia, Atrial fibrillation ([address] Dizziness, GERD (gastroesophageal reflux disease), Heartburn, History of stress test, Hypertension, and Lightheadedness. Past Surgical History:  has a past surgical history that includes Tonsillectomy; pacemaker placement; and other surgical history. Social History:  reports that he has never smoked.  He has never used smokeless tobacco. He reports current alcohol use of about 2.0 standard drinks of alcohol per week. He reports that he does not use drugs. Family History: family history includes Cancer in his sister; Other in his father and mother; Stroke in his father. The patients home medications have been reviewed. Allergies: Patient has no known allergies.     -------------------------------------------------- RESULTS -------------------------------------------------  Labs:  Results for orders placed or performed during the hospital encounter of 07/19/20   CBC Auto Differential   Result Value Ref Range    WBC 6.3 4.5 - 11.5 E9/L    RBC 4.10 3.80 - 5.80 E12/L    Hemoglobin 12.8 12.5 - 16.5 g/dL    Hematocrit 38.1 37.0 - 54.0 %    MCV 92.9 80.0 - 99.9 fL    MCH 31.2 26.0 - 35.0 pg    MCHC 33.6 32.0 - 34.5 %    RDW 13.9 11.5 - 15.0 fL    Platelets 639 733 - 396 E9/L    MPV 9.5 7.0 - 12.0 fL    Neutrophils % 71.7 43.0 - 80.0 %    Immature Granulocytes % 0.5 0.0 - 5.0 %    Lymphocytes % 16.5 (L) 20.0 - 42.0 %    Monocytes % 9.6 2.0 - 12.0 %    Eosinophils % 1.4 0.0 - 6.0 %    Basophils % 0.3 0.0 - 2.0 %    Neutrophils Absolute 4.48 1.80 - 7.30 E9/L    Immature Granulocytes # 0.03 E9/L    Lymphocytes Absolute 1.03 (L) 1.50 - 4.00 E9/L    Monocytes Absolute 0.60 0.10 - 0.95 E9/L    Eosinophils Absolute 0.09 0.05 - 0.50 E9/L    Basophils Absolute 0.02 0.00 - 0.20 E9/L   Comprehensive Metabolic Panel w/ Reflex to MG   Result Value Ref Range    Sodium 141 132 - 146 mmol/L    Potassium reflex Magnesium 4.5 3.5 - 5.0 mmol/L    Chloride 102 98 - 107 mmol/L    CO2 29 22 - 29 mmol/L    Anion Gap 10 7 - 16 mmol/L    Glucose 112 (H) 74 - 99 mg/dL    BUN 20 8 - 23 mg/dL    CREATININE 1.3 (H) 0.7 - 1.2 mg/dL    GFR Non-African American 53 >=60 mL/min/1.73    GFR African American >60     Calcium 9.2 8.6 - 10.2 mg/dL    Total Protein 6.7 6.4 - 8.3 g/dL    Alb 3.8 3.5 - 5.2 g/dL    Total Bilirubin 0.4 0.0 - 1.2 mg/dL    Alkaline Phosphatase 108 40 - 129 U/L    ALT 16 0 - 40 U/L    AST 16 0 - 39 U/L   Lipase   Result Value Ref Range    Lipase 32 13 - 60 U/L   Troponin   Result Value Ref Range    Troponin <0.01 0.00 - 0.03 ng/mL   Lactate, Sepsis   Result Value Ref Range    Lactic Acid, Sepsis 1.2 0.5 - 1.9 mmol/L   EKG 12 Lead   Result Value Ref Range    Ventricular Rate 76 BPM    Atrial Rate 76 BPM    P-R Interval 224 ms    QRS Duration 134 ms    Q-T Interval 404 ms    QTc Calculation (Bazett) 454 ms    P Axis 102 degrees    R Axis -63 degrees    T Axis 25 degrees       Radiology:  Us Gallbladder Ruq    Result Date: 2020  Patient MRN:  [id number] : 1939 Age: [de-identified] years Gender: Male Order Date:  2020 10:45 PM EXAM: US GALLBLADDER RUQ COMPARISON: None INDICATION:  RUQ pain RUQ pain FINDINGS: There is mild distention of the gallbladder. No significant gallbladder wall thickening or pericholecystic fluid. There is a polyp or adherent gallstone involving near field wall of the gallbladder which measures 5 x 5 mm. No obvious internal vascularity is demonstrated associated with this lesion. Common bile duct is nondilated measuring approximately 4.6 cm. There is no ascites. 1. Adherent gallstone or polyp is seen along the superior margin of the gallbladder which measures 5 x 5 mm. Continued follow-up may be helpful for further evaluation. 2. No significant gallbladder wall thickening or pericholecystic fluid. Xr Chest Portable    Result Date: 2020  Patient MRN:  [id number] : 1939 Age: [de-identified] years Gender: Male Order Date:  2020 10:45 PM EXAM: XR CHEST PORTABLE COMPARISON: 2020 INDICATION:  chest pain chest pain FINDINGS: The heart is normal in size. Left Mediport is present. No focal airspace opacity. There is no pleural effusion. There is no pneumothorax. No free air beneath the diaphragms.      No airspace opacities or pleural effusion.       ------------------------- NURSING NOTES AND VITALS REVIEWED ---------------------------  Date / Time Roomed:  2020 10:23 PM  ED Bed Assignment:      The Diagnosis:  1. Abdominal pain, epigastric    2. Other chest pain        Disposition:  Patient's disposition: Left against medical advice. Patient's condition is stable.     ATTENDING PROVIDER ATTESTATION:     Supervising Physician, on-site, available for consultation, non-participatory in the evaluation or care of this patient                   [place] DO Amirah  Resident  07/20/20 4923 Negative

## 2023-02-05 ENCOUNTER — TRANSCRIPTION ENCOUNTER (OUTPATIENT)
Age: 83
End: 2023-02-05

## 2023-02-05 VITALS
OXYGEN SATURATION: 100 % | TEMPERATURE: 98 F | RESPIRATION RATE: 18 BRPM | HEART RATE: 68 BPM | DIASTOLIC BLOOD PRESSURE: 56 MMHG | SYSTOLIC BLOOD PRESSURE: 108 MMHG

## 2023-02-05 DIAGNOSIS — E16.2 HYPOGLYCEMIA, UNSPECIFIED: ICD-10-CM

## 2023-02-05 DIAGNOSIS — F44.5 CONVERSION DISORDER WITH SEIZURES OR CONVULSIONS: ICD-10-CM

## 2023-02-05 LAB
CULTURE RESULTS: SIGNIFICANT CHANGE UP
GLUCOSE BLDC GLUCOMTR-MCNC: 157 MG/DL — HIGH (ref 70–99)
GLUCOSE BLDC GLUCOMTR-MCNC: 191 MG/DL — HIGH (ref 70–99)
GLUCOSE BLDC GLUCOMTR-MCNC: 224 MG/DL — HIGH (ref 70–99)
GLUCOSE BLDC GLUCOMTR-MCNC: 42 MG/DL — CRITICAL LOW (ref 70–99)
GLUCOSE BLDC GLUCOMTR-MCNC: 49 MG/DL — CRITICAL LOW (ref 70–99)
GLUCOSE BLDC GLUCOMTR-MCNC: 59 MG/DL — LOW (ref 70–99)
GLUCOSE BLDC GLUCOMTR-MCNC: 88 MG/DL — SIGNIFICANT CHANGE UP (ref 70–99)
SPECIMEN SOURCE: SIGNIFICANT CHANGE UP

## 2023-02-05 RX ORDER — AMLODIPINE BESYLATE 2.5 MG/1
1 TABLET ORAL
Qty: 30 | Refills: 0
Start: 2023-02-05

## 2023-02-05 RX ORDER — VALSARTAN 80 MG/1
1 TABLET ORAL
Qty: 30 | Refills: 0
Start: 2023-02-05

## 2023-02-05 RX ORDER — CALCIUM ACETATE 667 MG
0 TABLET ORAL
Qty: 0 | Refills: 0 | DISCHARGE

## 2023-02-05 RX ADMIN — HEPARIN SODIUM 5000 UNIT(S): 5000 INJECTION INTRAVENOUS; SUBCUTANEOUS at 05:26

## 2023-02-05 RX ADMIN — BRIMONIDINE TARTRATE 1 DROP(S): 2 SOLUTION/ DROPS OPHTHALMIC at 05:26

## 2023-02-05 RX ADMIN — Medication 650 MILLIGRAM(S): at 01:31

## 2023-02-05 RX ADMIN — Medication 1334 MILLIGRAM(S): at 08:41

## 2023-02-05 RX ADMIN — VALSARTAN 160 MILLIGRAM(S): 80 TABLET ORAL at 05:26

## 2023-02-05 RX ADMIN — AMLODIPINE BESYLATE 5 MILLIGRAM(S): 2.5 TABLET ORAL at 05:25

## 2023-02-05 RX ADMIN — BUDESONIDE AND FORMOTEROL FUMARATE DIHYDRATE 2 PUFF(S): 160; 4.5 AEROSOL RESPIRATORY (INHALATION) at 08:53

## 2023-02-05 NOTE — DISCHARGE NOTE PROVIDER - NSDCMRMEDTOKEN_GEN_ALL_CORE_FT
albuterol 90 mcg/inh inhalation aerosol: 1 puff(s) inhaled every 4 hours  amLODIPine 5 mg oral tablet: 1 tab(s) orally once a day  amlodipine-valsartan 5 mg-160 mg oral tablet: 1 tab(s) orally once a day  aspirin 81 mg oral tablet: 1 tab(s) orally once a day  atorvastatin 40 mg oral tablet: 1 tab(s) orally once a day (at bedtime)  BRIMONIDINE TARTRATE  0.15 % SOLN: 1 application to each affected eye 2 times a day  budesonide-formoterol 160 mcg-4.5 mcg/inh inhalation aerosol: 1 puff(s) inhaled 2 times a day   CALCIUM ACETATE  667 MG CAPS: 2 cap(s) orally 2 times a day (with meals)  EZETIMIBE  10 MG TABS:   LATANOPROST  0.005 % SOLN: 1 drop in each eye at bedtime for glaucoma  Ozempic 2 mg/1.5 mL (0.25 mg or 0.5 mg dose) subcutaneous solution: 0.5 milligram(s) subcutaneously once a week   pioglitazone 30 mg oral tablet: 1 tab(s) orally once a day   polyethylene glycol 3350 oral powder for reconstitution: 17 gram(s) orally once a day  Toujeo Max SoloStar 300 units/mL subcutaneous solution: 50 unit(s) subcutaneous once a day (at bedtime)   valsartan 160 mg oral tablet: 1 tab(s) orally once a day

## 2023-02-05 NOTE — DISCHARGE NOTE PROVIDER - ATTENDING DISCHARGE PHYSICAL EXAMINATION:
T(F): , Max: 98.3 (02-04-23 @ 22:15)  HR: 68 (02-05-23 @ 09:04) (68 - 88)  BP: 108/56 (02-05-23 @ 09:04)  RR: 18 (02-05-23 @ 09:04)  SpO2: 100% (02-05-23 @ 09:04)  IN: 0 mL / OUT: 1700 mL / NET: -1700 mL      General: No apparent distress  Cardiovascular: S1, S2  Gastrointestinal: Soft, Non-tender, Non-distended  Respiratory: Good air entry bilaterally  Musculoskeletal: Moves all extremities  Lymphatic: No edema  Neurologic: No gross motor deficit  Dermatologic: Skin dry                          9.9    7.78  )-----------( 309      ( 04 Feb 2023 08:37 )             31.8     02-04    139  |  101  |  73<HH>  ----------------------------<  255<H>  5.9<H>   |  18  |  6.2<HH>    Ca    8.6      04 Feb 2023 07:11    TPro  7.3  /  Alb  3.8  /  TBili  <0.2  /  DBili  x   /  AST  28  /  ALT  11  /  AlkPhos  130<H>  02-04

## 2023-02-05 NOTE — PROGRESS NOTE ADULT - ASSESSMENT
1, ESRD on HD MWF as outpatient, missed HD on Wednesday.    HD today, 3 hours, 2 k bath, UF 2-3 kg as tolerated  low K/Na diet  resume outpatient phos binders     2, poorly controlled DM with hypoglycemia presentation     need endo f/u for good regimen  f/u FS    d/w ER HS      pt seen in HD, tolerated ok, 2 k bath, UF as tolerated  f/u new labs.    d/w HD nurse
1, ESRD on HD [facility] as outpatient,    next HD tomorrow ( back to [facility]), 3 hours, 2 k bath, UF 2-3 kg as tolerated  low K/Na diet    2, poorly controlled DM with hypoglycemia presentation     need endo f/u for good regimen  f/u FS

## 2023-02-05 NOTE — DISCHARGE NOTE PROVIDER - CARE PROVIDER_API CALL
Daly Osuna)  Medicine  [address] [place] [id number]  Phone: [phone]  Fax: [phone]  Follow Up Time:     Sarah Britt)  Internal Medicine; Nephrology  [facility] [place] [address]  Phone: [phone]  Fax: [phone]  Follow Up Time:

## 2023-02-05 NOTE — PROGRESS NOTE ADULT - SUBJECTIVE AND OBJECTIVE BOX
[facility]  INITIAL CONSULT NOTE  --------------------------------------------------------------------------------  HPI:    84 y/o female with h/o HTN, DM, ESRD on HD via AVG MWF at [facility], missed last HD, presented to ER with hypoglycemia. recently admitted for hyperglycemia 1 week ago.     PAST HISTORY  --------------------------------------------------------------------------------  PAST MEDICAL & SURGICAL HISTORY:  Asthma, unspecified asthma severity, unspecified whether complicated, unspecified whether persistent      Hypertension affecting pregnancy, antepartum      Type 2 diabetes mellitus without complication, with long-term current use of insulin      No significant past surgical history        FAMILY HISTORY:    PAST SOCIAL HISTORY:    ALLERGIES & MEDICATIONS  --------------------------------------------------------------------------------  Allergies    No Known Allergies    Intolerances      Standing Inpatient Medications  amLODIPine   Tablet 5 milliGRAM(s) Oral daily  aspirin enteric coated 81 milliGRAM(s) Oral daily  atorvastatin 40 milliGRAM(s) Oral at bedtime  brimonidine 0.2% Ophthalmic Solution 1 Drop(s) Both EYES two times a day  budesonide 160 MICROgram(s)/formoterol 4.5 MICROgram(s) Inhaler 2 Puff(s) Inhalation two times a day  calcium acetate 1334 milliGRAM(s) Oral four times a day with meals  dextrose 5%. 1000 milliLiter(s) IV Continuous <Continuous>  dextrose 5%. 1000 milliLiter(s) IV Continuous <Continuous>  dextrose 50% Injectable 25 Gram(s) IV Push once  dextrose 50% Injectable 12.5 Gram(s) IV Push once  dextrose 50% Injectable 25 Gram(s) IV Push once  glucagon  Injectable 1 milliGRAM(s) IntraMuscular once  heparin   Injectable 5000 Unit(s) SubCutaneous every 8 hours  insulin glargine Injectable (LANTUS) 24 Unit(s) SubCutaneous at bedtime  insulin lispro (ADMELOG) corrective regimen sliding scale   SubCutaneous three times a day before meals  insulin lispro Injectable (ADMELOG) 8 Unit(s) SubCutaneous three times a day before meals  latanoprost 0.005% Ophthalmic Solution 1 Drop(s) Both EYES at bedtime  polyethylene glycol 3350 17 Gram(s) Oral daily  valsartan 160 milliGRAM(s) Oral daily    PRN Inpatient Medications  acetaminophen     Tablet .. 650 milliGRAM(s) Oral every 6 hours PRN  albuterol    90 MICROgram(s) HFA Inhaler 1 Puff(s) Inhalation every 4 hours PRN  dextrose Oral Gel 15 Gram(s) Oral once PRN  melatonin 3 milliGRAM(s) Oral at bedtime PRN      REVIEW OF SYSTEMS  --------------------------------------------------------------------------------    Skin: No rashes  Respiratory: No dyspnea, cough, wheezing, hemoptysis  CV: No chest pain, PND, orthopnea  GI: No abdominal pain, diarrhea, constipation, nausea, vomiting, melena, hematochezia  MSK: No joint pain/swelling; no back pain; no edema  Neuro: No dizziness/lightheadedness, weakness, seizures,   Heme: No easy bruising or bleeding      All other systems were reviewed and are negative, except as noted.    VITALS/PHYSICAL EXAM  --------------------------------------------------------------------------------  T(C): 37 (02-04-23 @ 05:45), Max: 37 (02-04-23 @ 05:45)  HR: 90 (02-04-23 @ 11:55) (82 - 90)  BP: 162/74 (02-04-23 @ 11:55) (141/64 - 171/88)  RR: 20 (02-04-23 @ 11:55) (18 - 20)  SpO2: 100% (02-04-23 @ 11:02) (100% - 100%)  Wt(kg): --  Height (cm): 162.6 (02-04-23 @ 05:45)  Weight (kg): 84.8 (02-04-23 @ 05:45)  BMI (kg/m2): 32.1 (02-04-23 @ 05:45)  BSA (m2): 1.9 (02-04-23 @ 05:45)      Physical Exam:  	Gen: no distress  	HEENT: PERRL, supple neck,  	Pulm: CTA B/L  	CV: S1S2; no rub  	Abd: +BS, soft, nontender/nondistended  	LE:  no edema  	Neuro: No focal deficits,   	    LABS/STUDIES  --------------------------------------------------------------------------------              9.9    7.78  >-----------<  309      [02-04-23 @ 08:37]              31.8     139  |  101  |  73  ----------------------------<  255      [02-04-23 @ 07:11]  5.9   |  18  |  6.2        Ca     8.6     [02-04-23 @ 07:11]    TPro  7.3  /  Alb  3.8  /  TBili  <0.2  /  DBili  x   /  AST  28  /  ALT  11  /  AlkPhos  130  [02-04-23 @ 07:11]          Creatinine Trend:  SCr 6.2 [02-04 @ 07:11]  SCr 6.2 [01-27 @ 07:00]  SCr 5.5 [01-26 @ 12:13]  SCr 5.5 [01-24 @ 22:35]    Urinalysis - [02-04-23 @ 09:44]      Color Yellow / Appearance Slightly Turbid / SG 1.011 / pH 7.5      Gluc >= 1000 mg/dL / Ketone Negative  / Bili Negative / Urobili <2 mg/dL       Blood Moderate / Protein 300 mg/dL / Leuk Est Large / Nitrite Negative      RBC 23 /  / Hyaline 3 / Gran  / Sq Epi  / Non Sq Epi 5 / Bacteria Many      HbA1c 8.3      [04-13-18 @ 06:02]      EMIR: titer 1:80, pattern Homogeneous      [04-12-18 @ 16:22]  dsDNA <12      [04-12-18 @ 16:22]  SPEP Interpretation: Normal Electrophoresis Pattern      [04-12-18 @ 20:04]  Immunofixation Urine: No Monoclonal Band Identified      [04-13-18 @ 02:13]  UPEP Interpretation: Mild Selective (Glomerular) Proteinuria      [04-13-18 @ 02:13]  
 [facility] FOLLOW UP NOTE  --------------------------------------------------------------------------------  Chief Complaint/24 hour events/subjective:    pt seen and examined, feel better, s/p HD yesterday    PAST HISTORY  --------------------------------------------------------------------------------  No significant changes to PMH, PSH, FHx, SHx, unless otherwise noted    ALLERGIES & MEDICATIONS  --------------------------------------------------------------------------------  Allergies    No Known Allergies    Intolerances      Standing Inpatient Medications  amLODIPine   Tablet 5 milliGRAM(s) Oral daily  aspirin enteric coated 81 milliGRAM(s) Oral daily  atorvastatin 40 milliGRAM(s) Oral at bedtime  brimonidine 0.2% Ophthalmic Solution 1 Drop(s) Both EYES two times a day  budesonide 160 MICROgram(s)/formoterol 4.5 MICROgram(s) Inhaler 2 Puff(s) Inhalation two times a day  calcium acetate 1334 milliGRAM(s) Oral three times a day with meals  dextrose 5%. 1000 milliLiter(s) IV Continuous <Continuous>  dextrose 5%. 1000 milliLiter(s) IV Continuous <Continuous>  dextrose 50% Injectable 25 Gram(s) IV Push once  dextrose 50% Injectable 12.5 Gram(s) IV Push once  dextrose 50% Injectable 25 Gram(s) IV Push once  dextrose 50% Injectable 12.5 Gram(s) IV Push once  glucagon  Injectable 1 milliGRAM(s) IntraMuscular once  glucagon  Injectable 1 milliGRAM(s) IntraMuscular once  heparin   Injectable 5000 Unit(s) SubCutaneous every 8 hours  insulin lispro (ADMELOG) corrective regimen sliding scale   SubCutaneous three times a day before meals  latanoprost 0.005% Ophthalmic Solution 1 Drop(s) Both EYES at bedtime  polyethylene glycol 3350 17 Gram(s) Oral daily  valsartan 160 milliGRAM(s) Oral daily    PRN Inpatient Medications  acetaminophen     Tablet .. 650 milliGRAM(s) Oral every 6 hours PRN  albuterol    90 MICROgram(s) HFA Inhaler 1 Puff(s) Inhalation every 4 hours PRN  dextrose Oral Gel 15 Gram(s) Oral once PRN  melatonin 3 milliGRAM(s) Oral at bedtime PRN      REVIEW OF SYSTEMS  --------------------------------------------------------------------------------    All other systems were reviewed and are negative, except as noted.    VITALS/PHYSICAL EXAM  --------------------------------------------------------------------------------  T(C): 36.6 (02-05-23 @ 09:04), Max: 36.8 (02-04-23 @ 22:15)  HR: 68 (02-05-23 @ 09:04) (68 - 90)  BP: 108/56 (02-05-23 @ 09:04) (108/56 - 162/74)  RR: 18 (02-05-23 @ 09:04) (18 - 20)  SpO2: 100% (02-05-23 @ 09:04) (98% - 100%)  Wt(kg): --  Height (cm): 162.6 (02-04-23 @ 05:45)  Weight (kg): 84.8 (02-04-23 @ 05:45)  BMI (kg/m2): 32.1 (02-04-23 @ 05:45)  BSA (m2): 1.9 (02-04-23 @ 05:45)      02-04-23 @ 07:01  -  02-05-23 @ 07:00  --------------------------------------------------------  IN: 0 mL / OUT: 1700 mL / NET: -1700 mL      Physical Exam:    	Gen: no distress   	Pulm: CTA B/L  	CV: S1S2; no rub  	Abd: +BS, soft, nontender/nondistended  	LE:  no  edema      LABS/STUDIES  --------------------------------------------------------------------------------              9.9    7.78  >-----------<  309      [02-04-23 @ 08:37]              31.8     139  |  101  |  73  ----------------------------<  255      [02-04-23 @ 07:11]  5.9   |  18  |  6.2        Ca     8.6     [02-04-23 @ 07:11]    TPro  7.3  /  Alb  3.8  /  TBili  <0.2  /  DBili  x   /  AST  28  /  ALT  11  /  AlkPhos  130  [02-04-23 @ 07:11]          Creatinine Trend:  SCr 6.2 [02-04 @ 07:11]  SCr 6.2 [01-27 @ 07:00]  SCr 5.5 [01-26 @ 12:13]  SCr 5.5 [01-24 @ 22:35]    Urinalysis - [02-04-23 @ 09:44]      Color Yellow / Appearance Slightly Turbid / SG 1.011 / pH 7.5      Gluc >= 1000 mg/dL / Ketone Negative  / Bili Negative / Urobili <2 mg/dL       Blood Moderate / Protein 300 mg/dL / Leuk Est Large / Nitrite Negative      RBC 23 /  / Hyaline 3 / Gran  / Sq Epi  / Non Sq Epi 5 / Bacteria Many      HbA1c 8.3      [04-13-18 @ 06:02]

## 2023-02-06 LAB — SARS-COV-2 RNA SPEC QL NAA+PROBE: SIGNIFICANT CHANGE UP

## 2023-02-13 DIAGNOSIS — N18.6 END STAGE RENAL DISEASE: ICD-10-CM

## 2023-02-13 DIAGNOSIS — E11.649 TYPE 2 DIABETES MELLITUS WITH HYPOGLYCEMIA WITHOUT COMA: ICD-10-CM

## 2023-02-13 DIAGNOSIS — I12.0 HYPERTENSIVE CHRONIC KIDNEY DISEASE WITH STAGE 5 CHRONIC KIDNEY DISEASE OR END STAGE RENAL DISEASE: ICD-10-CM

## 2023-02-13 DIAGNOSIS — Z99.2 DEPENDENCE ON RENAL DIALYSIS: ICD-10-CM

## 2023-02-13 DIAGNOSIS — Z79.4 LONG TERM (CURRENT) USE OF INSULIN: ICD-10-CM

## 2023-02-13 DIAGNOSIS — E87.70 FLUID OVERLOAD, UNSPECIFIED: ICD-10-CM

## 2023-02-13 DIAGNOSIS — J45.909 UNSPECIFIED ASTHMA, UNCOMPLICATED: ICD-10-CM

## 2023-02-13 DIAGNOSIS — R82.71 BACTERIURIA: ICD-10-CM

## 2023-02-13 DIAGNOSIS — E11.22 TYPE 2 DIABETES MELLITUS WITH DIABETIC CHRONIC KIDNEY DISEASE: ICD-10-CM

## 2023-03-05 ENCOUNTER — INPATIENT (INPATIENT)
Facility: HOSPITAL | Age: 83
LOS: 4 days | Discharge: HOME HEALTH | DRG: 391 | End: 2023-03-10
Attending: STUDENT IN AN ORGANIZED HEALTH CARE EDUCATION/TRAINING PROGRAM | Admitting: STUDENT IN AN ORGANIZED HEALTH CARE EDUCATION/TRAINING PROGRAM
Payer: MEDICARE

## 2023-03-05 VITALS
HEIGHT: 64 IN | SYSTOLIC BLOOD PRESSURE: 90 MMHG | HEART RATE: 34 BPM | RESPIRATION RATE: 20 BRPM | DIASTOLIC BLOOD PRESSURE: 42 MMHG | OXYGEN SATURATION: 100 %

## 2023-03-05 LAB
ALBUMIN SERPL ELPH-MCNC: 3.4 G/DL — LOW (ref 3.5–5.2)
ALP SERPL-CCNC: 152 U/L — HIGH (ref 30–115)
ALT FLD-CCNC: 57 U/L — HIGH (ref 0–41)
ANION GAP SERPL CALC-SCNC: 14 MMOL/L — SIGNIFICANT CHANGE UP (ref 7–14)
APTT BLD: 31.4 SEC — SIGNIFICANT CHANGE UP (ref 27–39.2)
AST SERPL-CCNC: 95 U/L — HIGH (ref 0–41)
B-OH-BUTYR SERPL-SCNC: <0.2 MMOL/L — SIGNIFICANT CHANGE UP
BASE EXCESS BLDV CALC-SCNC: 0.6 MMOL/L — SIGNIFICANT CHANGE UP (ref -2–3)
BASOPHILS # BLD AUTO: 0.04 K/UL — SIGNIFICANT CHANGE UP (ref 0–0.2)
BASOPHILS NFR BLD AUTO: 0.7 % — SIGNIFICANT CHANGE UP (ref 0–1)
BILIRUB SERPL-MCNC: <0.2 MG/DL — SIGNIFICANT CHANGE UP (ref 0.2–1.2)
BUN SERPL-MCNC: 98 MG/DL — CRITICAL HIGH (ref 10–20)
CA-I SERPL-SCNC: 0.99 MMOL/L — LOW (ref 1.15–1.33)
CALCIUM SERPL-MCNC: 7.6 MG/DL — LOW (ref 8.4–10.5)
CHLORIDE SERPL-SCNC: 90 MMOL/L — LOW (ref 98–110)
CO2 SERPL-SCNC: 21 MMOL/L — SIGNIFICANT CHANGE UP (ref 17–32)
CREAT SERPL-MCNC: 8.5 MG/DL — CRITICAL HIGH (ref 0.7–1.5)
EGFR: 4 ML/MIN/1.73M2 — LOW
EOSINOPHIL # BLD AUTO: 0.26 K/UL — SIGNIFICANT CHANGE UP (ref 0–0.7)
EOSINOPHIL NFR BLD AUTO: 4.4 % — SIGNIFICANT CHANGE UP (ref 0–8)
FLUAV AG NPH QL: SIGNIFICANT CHANGE UP
FLUBV AG NPH QL: SIGNIFICANT CHANGE UP
GAS PNL BLDV: 124 MMOL/L — LOW (ref 136–145)
GAS PNL BLDV: SIGNIFICANT CHANGE UP
GLUCOSE SERPL-MCNC: 298 MG/DL — HIGH (ref 70–99)
HCO3 BLDV-SCNC: 24 MMOL/L — SIGNIFICANT CHANGE UP (ref 22–29)
HCT VFR BLD CALC: 28.3 % — LOW (ref 37–47)
HCT VFR BLDA CALC: 28 % — LOW (ref 39–51)
HGB BLD CALC-MCNC: 9.3 G/DL — LOW (ref 12.6–17.4)
HGB BLD-MCNC: 9.1 G/DL — LOW (ref 12–16)
IMM GRANULOCYTES NFR BLD AUTO: 0.7 % — HIGH (ref 0.1–0.3)
INR BLD: 0.92 RATIO — SIGNIFICANT CHANGE UP (ref 0.65–1.3)
LACTATE BLDV-MCNC: 1.7 MMOL/L — SIGNIFICANT CHANGE UP (ref 0.5–2)
LYMPHOCYTES # BLD AUTO: 2.04 K/UL — SIGNIFICANT CHANGE UP (ref 1.2–3.4)
LYMPHOCYTES # BLD AUTO: 34.3 % — SIGNIFICANT CHANGE UP (ref 20.5–51.1)
MCHC RBC-ENTMCNC: 30.7 PG — SIGNIFICANT CHANGE UP (ref 27–31)
MCHC RBC-ENTMCNC: 32.2 G/DL — SIGNIFICANT CHANGE UP (ref 32–37)
MCV RBC AUTO: 95.6 FL — SIGNIFICANT CHANGE UP (ref 81–99)
MONOCYTES # BLD AUTO: 0.74 K/UL — HIGH (ref 0.1–0.6)
MONOCYTES NFR BLD AUTO: 12.4 % — HIGH (ref 1.7–9.3)
NEUTROPHILS # BLD AUTO: 2.83 K/UL — SIGNIFICANT CHANGE UP (ref 1.4–6.5)
NEUTROPHILS NFR BLD AUTO: 47.5 % — SIGNIFICANT CHANGE UP (ref 42.2–75.2)
NRBC # BLD: 0 /100 WBCS — SIGNIFICANT CHANGE UP (ref 0–0)
NT-PROBNP SERPL-SCNC: 1813 PG/ML — HIGH (ref 0–300)
PCO2 BLDV: 33 MMHG — LOW (ref 39–42)
PH BLDV: 7.47 — HIGH (ref 7.32–7.43)
PLATELET # BLD AUTO: 227 K/UL — SIGNIFICANT CHANGE UP (ref 130–400)
PO2 BLDV: 72 MMHG — SIGNIFICANT CHANGE UP
POTASSIUM BLDV-SCNC: 6.7 MMOL/L — CRITICAL HIGH (ref 3.5–5.1)
POTASSIUM SERPL-MCNC: 6.9 MMOL/L — CRITICAL HIGH (ref 3.5–5)
POTASSIUM SERPL-SCNC: 6.9 MMOL/L — CRITICAL HIGH (ref 3.5–5)
PROT SERPL-MCNC: 6.2 G/DL — SIGNIFICANT CHANGE UP (ref 6–8)
PROTHROM AB SERPL-ACNC: 10.5 SEC — SIGNIFICANT CHANGE UP (ref 9.95–12.87)
RBC # BLD: 2.96 M/UL — LOW (ref 4.2–5.4)
RBC # FLD: 15.8 % — HIGH (ref 11.5–14.5)
RSV RNA NPH QL NAA+NON-PROBE: SIGNIFICANT CHANGE UP
SAO2 % BLDV: 98.2 % — SIGNIFICANT CHANGE UP
SARS-COV-2 RNA SPEC QL NAA+PROBE: SIGNIFICANT CHANGE UP
SODIUM SERPL-SCNC: 125 MMOL/L — LOW (ref 135–146)
TROPONIN T SERPL-MCNC: 0.02 NG/ML — HIGH
WBC # BLD: 5.95 K/UL — SIGNIFICANT CHANGE UP (ref 4.8–10.8)
WBC # FLD AUTO: 5.95 K/UL — SIGNIFICANT CHANGE UP (ref 4.8–10.8)

## 2023-03-05 PROCEDURE — 99291 CRITICAL CARE FIRST HOUR: CPT

## 2023-03-05 PROCEDURE — 71045 X-RAY EXAM CHEST 1 VIEW: CPT | Mod: 26

## 2023-03-05 PROCEDURE — 93010 ELECTROCARDIOGRAM REPORT: CPT

## 2023-03-05 RX ORDER — ONDANSETRON 8 MG/1
4 TABLET, FILM COATED ORAL ONCE
Refills: 0 | Status: COMPLETED | OUTPATIENT
Start: 2023-03-05 | End: 2023-03-05

## 2023-03-05 RX ORDER — CALCIUM GLUCONATE 100 MG/ML
2 VIAL (ML) INTRAVENOUS ONCE
Refills: 0 | Status: COMPLETED | OUTPATIENT
Start: 2023-03-05 | End: 2023-03-05

## 2023-03-05 RX ORDER — INSULIN HUMAN 100 [IU]/ML
10 INJECTION, SOLUTION SUBCUTANEOUS ONCE
Refills: 0 | Status: COMPLETED | OUTPATIENT
Start: 2023-03-05 | End: 2023-03-05

## 2023-03-05 RX ORDER — SODIUM CHLORIDE 9 MG/ML
1000 INJECTION, SOLUTION INTRAVENOUS ONCE
Refills: 0 | Status: COMPLETED | OUTPATIENT
Start: 2023-03-05 | End: 2023-03-05

## 2023-03-05 RX ORDER — ALBUTEROL 90 UG/1
2.5 AEROSOL, METERED ORAL
Refills: 0 | Status: DISCONTINUED | OUTPATIENT
Start: 2023-03-05 | End: 2023-03-10

## 2023-03-05 RX ADMIN — ALBUTEROL 2.5 MILLIGRAM(S): 90 AEROSOL, METERED ORAL at 22:38

## 2023-03-05 RX ADMIN — INSULIN HUMAN 10 UNIT(S): 100 INJECTION, SOLUTION SUBCUTANEOUS at 22:37

## 2023-03-05 RX ADMIN — Medication 200 GRAM(S): at 22:36

## 2023-03-05 RX ADMIN — ONDANSETRON 4 MILLIGRAM(S): 8 TABLET, FILM COATED ORAL at 22:35

## 2023-03-05 RX ADMIN — SODIUM CHLORIDE 1000 MILLILITER(S): 9 INJECTION, SOLUTION INTRAVENOUS at 22:35

## 2023-03-05 NOTE — ED PROVIDER NOTE - CLINICAL SUMMARY MEDICAL DECISION MAKING FREE TEXT BOX
83-year-old female with past medical history of diabetes on insulin, high blood pressure, asthma, end-stage renal disease on hemodialysis Monday, Wednesday, and Friday states she missed Friday because she was told that she did not need to go, past surgical history of JUDAH and cholecystectomy, admission February 4 to February 5 for hypoglycemia and missing hemodialysis presents today for hyperglycemia where granddaughter states that patient's fingerstick at home was 347 patient took 40 units of her insulin went down to 319 , afterwards started to complain of nausea and vomited once nonbilious nonbloody .  EMS was called and report when they arrived fingerstick was in the 400s and patient was bradycardic to the 30s and hypoxic to 82% on room air, improved on nonrebreather.  In the emergency department fingerstick is 304, patient saturating at 100% on room air with a heart rate in the 30s to 40s.  Patient reports generalized weakness, nausea as well as having 1 episode of nonbloody diarrhea.  No fever, chills, n/v, cp,  pleuritic cp, sob, palpitations, diaphoresis, cough, ha/lh/dizziness, numbness/tingling, neck pain/ stiffness, abd pain, , constipation, melena/brbpr, urinary symptoms, trauma,  edema, calf pain/swelling/erythema, sick contacts, recent travel or rash.    Vital Signs: I have reviewed the initial vital signs. Constitutional: Generally weak appearing pt sitting on stretcher speaking full sentences. Integumentary: No rash. ENT: MMM NECK: Supple, non-tender, no meningeal signs. Cardiovascular: Steve, radial pulses 2/4 b/l. No JVD. Respiratory: BS present b/l, ctabl, no wheezing or crackles,  no accessory muscle use, no stridor. Gastrointestinal: BS present throughout all 4 quadrants, soft, nd, nt, no rebound tenderness or guarding, no cvat. Musculoskeletal: FROM, no edema, no calf pain/swelling/erythema. Neurologic: AAOx3, motor 5/5 and sensation intact throughout upper and lowe ext, CN II-XII intact, No facial droop or slurring of speech. No focal deficits.    Plan: Pacer pads, Monitor, EKG, CXR, labs, urine, ivf, reassess.     Labs and EKG were ordered and reviewed.  Imaging was ordered and reviewed by me.  Appropriate medications for patient's presenting complaints were ordered and effects were reassessed.  Patient's records (prior hospital, ED visit) were reviewed.  Additional history was obtained from EMS, family.  Escalation to admission/observation was considered.  Patient requires inpatient hospitalization - monitored setting. 83-year-old female with past medical history of diabetes on insulin, high blood pressure, asthma, end-stage renal disease on hemodialysis Monday, Wednesday, and Friday states she missed Friday because she was told that she did not need to go, past surgical history of JUDAH and cholecystectomy, admission February 4 to February 5 for hypoglycemia and missing hemodialysis presents today for hyperglycemia where granddaughter states that patient's fingerstick at home was 347 patient took 40 units of her insulin went down to 319 , afterwards started to complain of nausea and vomited once nonbilious nonbloody .  EMS was called and report when they arrived fingerstick was in the 400s and patient was bradycardic to the 30s and hypoxic to 82% on room air, improved on nonrebreather.  In the emergency department fingerstick is 304, patient saturating at 100% on room air with a heart rate in the 30s to 40s.  Patient reports generalized weakness, nausea as well as having 1 episode of nonbloody diarrhea.  No fever, chills, n/v, cp,  pleuritic cp, sob, palpitations, diaphoresis, cough, ha/lh/dizziness, numbness/tingling, neck pain/ stiffness, abd pain, , constipation, melena/brbpr, urinary symptoms, trauma,  edema, calf pain/swelling/erythema, sick contacts, recent travel or rash.    jfk: received sign out from Dr. Eugene. Patient with hyperkalemia, requiring IV calcium, albuterol and IV insulin.  Improvement on EKG posttreatment.  Admitted to stepdown unit.  No acute surgical abnormalities on CT abdomen pelvis.    Vital Signs: I have reviewed the initial vital signs. Constitutional: Generally weak appearing pt sitting on stretcher speaking full sentences. Integumentary: No rash. ENT: MMM NECK: Supple, non-tender, no meningeal signs. Cardiovascular: Steve, radial pulses 2/4 b/l. No JVD. Respiratory: BS present b/l, ctabl, no wheezing or crackles,  no accessory muscle use, no stridor. Gastrointestinal: BS present throughout all 4 quadrants, soft, nd, nt, no rebound tenderness or guarding, no cvat. Musculoskeletal: FROM, no edema, no calf pain/swelling/erythema. Neurologic: AAOx3, motor 5/5 and sensation intact throughout upper and lowe ext, CN II-XII intact, No facial droop or slurring of speech. No focal deficits.    Plan: Pacer pads, Monitor, EKG, CXR, labs, urine, ivf, reassess.     Labs and EKG were ordered and reviewed.  Imaging was ordered and reviewed by me.  Appropriate medications for patient's presenting complaints were ordered and effects were reassessed.  Patient's records (prior hospital, ED visit) were reviewed.  Additional history was obtained from EMS, family.  Escalation to admission/observation was considered.  Patient requires inpatient hospitalization - monitored setting. 83-year-old female with past medical history of diabetes on insulin, high blood pressure, asthma, end-stage renal disease on hemodialysis Monday, Wednesday, and Friday states she missed Friday because she was told that she did not need to go, past surgical history of JUDAH and cholecystectomy, admission February 4 to February 5 for hypoglycemia and missing hemodialysis presents today for hyperglycemia where granddaughter states that patient's fingerstick at home was 347 patient took 40 units of her insulin went down to 319 , afterwards started to complain of nausea and vomited once nonbilious nonbloody .  EMS was called and report when they arrived fingerstick was in the 400s and patient was bradycardic to the 30s and hypoxic to 82% on room air, improved on nonrebreather.  In the emergency department fingerstick is 304, patient saturating at 100% on room air with a heart rate in the 30s to 40s.  Patient reports generalized weakness, nausea as well as having 1 episode of nonbloody diarrhea.  No fever, chills, n/v, cp,  pleuritic cp, sob, palpitations, diaphoresis, cough, ha/lh/dizziness, numbness/tingling, neck pain/ stiffness, abd pain, , constipation, melena/brbpr, urinary symptoms, trauma,  edema, calf pain/swelling/erythema, sick contacts, recent travel or rash.    Vital Signs: I have reviewed the initial vital signs. Constitutional: Generally weak appearing pt sitting on stretcher speaking full sentences. Integumentary: No rash. ENT: MMM NECK: Supple, non-tender, no meningeal signs. Cardiovascular: Steve, radial pulses 2/4 b/l. No JVD. Respiratory: BS present b/l, ctabl, no wheezing or crackles,  no accessory muscle use, no stridor. Gastrointestinal: BS present throughout all 4 quadrants, soft, nd, nt, no rebound tenderness or guarding, no cvat. Musculoskeletal: FROM, no edema, no calf pain/swelling/erythema. Neurologic: AAOx3, motor 5/5 and sensation intact throughout upper and lowe ext, CN II-XII intact, No facial droop or slurring of speech. No focal deficits.    Plan: Pacer pads, Monitor, EKG, CXR, labs, urine, ivf, reassess.     Labs and EKG were ordered and reviewed.  Imaging was ordered and reviewed by me.  Appropriate medications for patient's presenting complaints were ordered and effects were reassessed.  Patient's records (prior hospital, ED visit) were reviewed.  Additional history was obtained from EMS, family.  Escalation to admission/observation was considered.  Patient requires inpatient hospitalization - monitored setting.      jfk: received sign out from Dr. Eugene. Patient with hyperkalemia, requiring IV calcium, albuterol and IV insulin.  Improvement on EKG posttreatment.  unable to reach nephrology but d/w ICU fellow and with improvement in K and EKG, does not feel needs emergent HD now so Admitted to stepdown unit.  No acute surgical abnormalities on CT abdomen pelvis.

## 2023-03-05 NOTE — ED PROVIDER NOTE - PROGRESS NOTE DETAILS
ED Attending KIRK Eugene  Due to patient missing hemodialysis, in the setting of bradycardia, patient was difficult IV stick, ABG was obtained to obtain a stat potassium, noted to be  6.7 on ABG, calcium, insulin being given for hyper K and hyperglycemia, in addition to calcium and albuterol.  Patient on pacer pads, and monitor.  Will reassess. ED Attending KIRK Eugene  Patient endorsed to Dr. Gage Nayak, please follow-up rest of labs, imaging, reassess. SS Dr Lundberg from Dr Britt's nephro group returned consult - planning for HD today.

## 2023-03-05 NOTE — ED ADULT NURSE NOTE - OBJECTIVE STATEMENT
Pt BIBA presents to ED with hyperglycemia. Medical h/o asthma, HTN and T2DM. As per family at bedside, pt's FS at home was 347 --> 319 --> 390 despite administering insulin. Pt's family reported shortness of breath and weakness. Denies any falls. Pt BIBA presents to ED with hyperglycemia. Medical h/o asthma, HTN and T2DM. As per family at bedside, glucose level persistently high despite receiving 40 units insulin at home. As per EMS, pt was bradycardic and hypoxic upon arrival. Pt's family reported shortness of breath and weakness. Denies loss of consciousness or falls. Pt BIBA presents to ED with hyperglycemia. Medical h/o asthma, HTN and T2DM. As per family at bedside, glucose level persistently high despite receiving 40 units insulin at home. As per EMS, pt was bradycardic and hypoxic upon arrival. Pt's family reported shortness of breath and weakness. Denies loss of consciousness or falls. Pt also has right arm fistula for hemodialysis. Pt BIBA presents to ED with hyperglycemia. Medical h/o asthma, HTN and T2DM. As per family at bedside, glucose level still high despite receiving 40 units insulin at home. As per EMS, pt was bradycardic and hypoxic upon arrival. Pt's family reported shortness of breath and weakness. Denies loss of consciousness or falls. Pt also has right arm fistula for hemodialysis.

## 2023-03-05 NOTE — ED PROVIDER NOTE - NS ED MD EKG INTERPRETATION 1
Normal sinus rhythm, left axis deviation, left anterior fascicular block, , QTc 489, no peak T waves

## 2023-03-05 NOTE — ED ADULT TRIAGE NOTE - CHIEF COMPLAINT QUOTE
Patient coming in for "high sugars" upon arrival EMS found patient to be bradycardic and hypoxic (82%)- patient brought to crit.

## 2023-03-05 NOTE — ED PROVIDER NOTE - PHYSICAL EXAMINATION
CONSTITUTIONAL: NAD  SKIN: Warm dry  HEAD: NCAT  EYES: NL inspection  ENT: MMM  NECK: Supple; non tender.  CARD: Regular, bradycardic   RESP: CTAB  ABD: S/NT no R/G  EXT: no pedal edema  NEURO: Grossly unremarkable  PSYCH: Cooperative, appropriate.

## 2023-03-05 NOTE — ED PROVIDER NOTE - ATTENDING CONTRIBUTION TO CARE
83-year-old female with past medical history of diabetes on insulin, high blood pressure, asthma, end-stage renal disease on hemodialysis Monday, Wednesday, and Friday states she missed Friday because she was told that she did not need to go, past surgical history of JUDAH and cholecystectomy, admission February 4 to February 5 for hypoglycemia and missing hemodialysis presents today for hyperglycemia where granddaughter states that patient's fingerstick at home was 347 patient took 40 units of her insulin went down to 319 , afterwards started to complain of nausea and vomited once nonbilious nonbloody .  EMS was called and report when they arrived fingerstick was in the 400s and patient was bradycardic to the 30s and hypoxic to 82% on room air, improved on nonrebreather.  In the emergency department fingerstick is 304, patient saturating at 100% on room air with a heart rate in the 30s to 40s.  Patient reports generalized weakness, nausea as well as having 1 episode of nonbloody diarrhea.  No fever, chills, n/v, cp,  pleuritic cp, sob, palpitations, diaphoresis, cough, ha/lh/dizziness, numbness/tingling, neck pain/ stiffness, abd pain, , constipation, melena/brbpr, urinary symptoms, trauma,  edema, calf pain/swelling/erythema, sick contacts, recent travel or rash.    Vital Signs: I have reviewed the initial vital signs. Constitutional: Generally weak appearing pt sitting on stretcher speaking full sentences. Integumentary: No rash. ENT: MMM NECK: Supple, non-tender, no meningeal signs. Cardiovascular: Steve, radial pulses 2/4 b/l. No JVD. Respiratory: BS present b/l, ctabl, no wheezing or crackles,  no accessory muscle use, no stridor. Gastrointestinal: BS present throughout all 4 quadrants, soft, nd, nt, no rebound tenderness or guarding, no cvat. Musculoskeletal: FROM, no edema, no calf pain/swelling/erythema. Neurologic: AAOx3, motor 5/5 and sensation intact throughout upper and lowe ext, CN II-XII intact, No facial droop or slurring of speech. No focal deficits.    Plan: Pacer pads, Monitor, EKG, CXR, labs, urine, ivf, reassess.

## 2023-03-05 NOTE — ED PROVIDER NOTE - OBJECTIVE STATEMENT
84 yo f ho insulin dep DM, htn, asthma, ESRD on HD M/W/Fri presents for weakeness. Admits did not go for HD on Friday because nephrologist said its OK. Today patient was nausea, had 1 ep vom and diarrhea prior to arrival. Upon EMS arrival pt saturating 82% on RA, placed on 15 L and sat 100%. HR was in 30s.   Upon ED arrival patient HR 30s, admits to mild nausea. Denies nausea, vomiting, cp, sob, abd pain, headache  Accompanied by granddaughter

## 2023-03-05 NOTE — ED PROVIDER NOTE - CONSIDERATION OF ADMISSION OBSERVATION
Escalation to admission/observation was considered. Patient requires inpatient hospitalization - monitored setting. Consideration of Admission/Observation

## 2023-03-06 DIAGNOSIS — E87.5 HYPERKALEMIA: ICD-10-CM

## 2023-03-06 LAB
ALBUMIN SERPL ELPH-MCNC: 3.1 G/DL — LOW (ref 3.5–5.2)
ALP SERPL-CCNC: 121 U/L — HIGH (ref 30–115)
ALT FLD-CCNC: 48 U/L — HIGH (ref 0–41)
ANION GAP SERPL CALC-SCNC: 15 MMOL/L — HIGH (ref 7–14)
APPEARANCE UR: ABNORMAL
AST SERPL-CCNC: 59 U/L — HIGH (ref 0–41)
BACTERIA # UR AUTO: ABNORMAL
BASE EXCESS BLDV CALC-SCNC: -2.7 MMOL/L — LOW (ref -2–3)
BASOPHILS # BLD AUTO: 0.02 K/UL — SIGNIFICANT CHANGE UP (ref 0–0.2)
BASOPHILS NFR BLD AUTO: 0.3 % — SIGNIFICANT CHANGE UP (ref 0–1)
BILIRUB SERPL-MCNC: <0.2 MG/DL — SIGNIFICANT CHANGE UP (ref 0.2–1.2)
BILIRUB UR-MCNC: NEGATIVE — SIGNIFICANT CHANGE UP
BUN SERPL-MCNC: 96 MG/DL — CRITICAL HIGH (ref 10–20)
CA-I SERPL-SCNC: 1.02 MMOL/L — LOW (ref 1.15–1.33)
CALCIUM SERPL-MCNC: 7.7 MG/DL — LOW (ref 8.4–10.5)
CHLORIDE SERPL-SCNC: 91 MMOL/L — LOW (ref 98–110)
CHOLEST SERPL-MCNC: 143 MG/DL — SIGNIFICANT CHANGE UP
CO2 SERPL-SCNC: 21 MMOL/L — SIGNIFICANT CHANGE UP (ref 17–32)
COLOR SPEC: YELLOW — SIGNIFICANT CHANGE UP
CREAT SERPL-MCNC: 8.1 MG/DL — CRITICAL HIGH (ref 0.7–1.5)
DIFF PNL FLD: ABNORMAL
EGFR: 5 ML/MIN/1.73M2 — LOW
EOSINOPHIL # BLD AUTO: 0.05 K/UL — SIGNIFICANT CHANGE UP (ref 0–0.7)
EOSINOPHIL NFR BLD AUTO: 0.7 % — SIGNIFICANT CHANGE UP (ref 0–8)
EPI CELLS # UR: 11 /HPF — HIGH (ref 0–5)
GAS PNL BLDV: 118 MMOL/L — CRITICAL LOW (ref 136–145)
GAS PNL BLDV: SIGNIFICANT CHANGE UP
GLUCOSE BLDC GLUCOMTR-MCNC: 127 MG/DL — HIGH (ref 70–99)
GLUCOSE BLDC GLUCOMTR-MCNC: 139 MG/DL — HIGH (ref 70–99)
GLUCOSE BLDC GLUCOMTR-MCNC: 178 MG/DL — HIGH (ref 70–99)
GLUCOSE BLDC GLUCOMTR-MCNC: 179 MG/DL — HIGH (ref 70–99)
GLUCOSE SERPL-MCNC: 178 MG/DL — HIGH (ref 70–99)
GLUCOSE UR QL: NEGATIVE — SIGNIFICANT CHANGE UP
HCO3 BLDV-SCNC: 24 MMOL/L — SIGNIFICANT CHANGE UP (ref 22–29)
HCT VFR BLD CALC: 25.5 % — LOW (ref 37–47)
HCT VFR BLDA CALC: 24 % — LOW (ref 39–51)
HDLC SERPL-MCNC: 42 MG/DL — LOW
HGB BLD CALC-MCNC: 7.9 G/DL — LOW (ref 12.6–17.4)
HGB BLD-MCNC: 8.5 G/DL — LOW (ref 12–16)
HYALINE CASTS # UR AUTO: 1 /LPF — SIGNIFICANT CHANGE UP (ref 0–7)
IMM GRANULOCYTES NFR BLD AUTO: 0.3 % — SIGNIFICANT CHANGE UP (ref 0.1–0.3)
KETONES UR-MCNC: NEGATIVE — SIGNIFICANT CHANGE UP
LACTATE BLDV-MCNC: 3 MMOL/L — HIGH (ref 0.5–2)
LACTATE SERPL-SCNC: 2.9 MMOL/L — HIGH (ref 0.7–2)
LEUKOCYTE ESTERASE UR-ACNC: ABNORMAL
LIPID PNL WITH DIRECT LDL SERPL: 87 MG/DL — SIGNIFICANT CHANGE UP
LYMPHOCYTES # BLD AUTO: 1.77 K/UL — SIGNIFICANT CHANGE UP (ref 1.2–3.4)
LYMPHOCYTES # BLD AUTO: 23.7 % — SIGNIFICANT CHANGE UP (ref 20.5–51.1)
MAGNESIUM SERPL-MCNC: 2 MG/DL — SIGNIFICANT CHANGE UP (ref 1.8–2.4)
MCHC RBC-ENTMCNC: 31.3 PG — HIGH (ref 27–31)
MCHC RBC-ENTMCNC: 33.3 G/DL — SIGNIFICANT CHANGE UP (ref 32–37)
MCV RBC AUTO: 93.8 FL — SIGNIFICANT CHANGE UP (ref 81–99)
MONOCYTES # BLD AUTO: 0.76 K/UL — HIGH (ref 0.1–0.6)
MONOCYTES NFR BLD AUTO: 10.2 % — HIGH (ref 1.7–9.3)
NEUTROPHILS # BLD AUTO: 4.85 K/UL — SIGNIFICANT CHANGE UP (ref 1.4–6.5)
NEUTROPHILS NFR BLD AUTO: 64.8 % — SIGNIFICANT CHANGE UP (ref 42.2–75.2)
NITRITE UR-MCNC: NEGATIVE — SIGNIFICANT CHANGE UP
NON HDL CHOLESTEROL: 101 MG/DL — SIGNIFICANT CHANGE UP
NRBC # BLD: 0 /100 WBCS — SIGNIFICANT CHANGE UP (ref 0–0)
OSMOLALITY SERPL: 309 MOS/KG — HIGH (ref 280–301)
PCO2 BLDV: 51 MMHG — HIGH (ref 39–42)
PH BLDV: 7.28 — LOW (ref 7.32–7.43)
PH UR: 8 — SIGNIFICANT CHANGE UP (ref 5–8)
PLATELET # BLD AUTO: 232 K/UL — SIGNIFICANT CHANGE UP (ref 130–400)
PO2 BLDV: 38 MMHG — SIGNIFICANT CHANGE UP
POTASSIUM BLDV-SCNC: 6.5 MMOL/L — CRITICAL HIGH (ref 3.5–5.1)
POTASSIUM SERPL-MCNC: 6.1 MMOL/L — CRITICAL HIGH (ref 3.5–5)
POTASSIUM SERPL-SCNC: 6.1 MMOL/L — CRITICAL HIGH (ref 3.5–5)
PROT SERPL-MCNC: 5.8 G/DL — LOW (ref 6–8)
PROT UR-MCNC: ABNORMAL
RBC # BLD: 2.72 M/UL — LOW (ref 4.2–5.4)
RBC # FLD: 15.8 % — HIGH (ref 11.5–14.5)
RBC CASTS # UR COMP ASSIST: 6 /HPF — HIGH (ref 0–4)
SAO2 % BLDV: 60 % — SIGNIFICANT CHANGE UP
SODIUM SERPL-SCNC: 127 MMOL/L — LOW (ref 135–146)
SP GR SPEC: 1.01 — SIGNIFICANT CHANGE UP (ref 1.01–1.03)
TRIGL SERPL-MCNC: 73 MG/DL — SIGNIFICANT CHANGE UP
TROPONIN T SERPL-MCNC: 0.04 NG/ML — CRITICAL HIGH
UROBILINOGEN FLD QL: SIGNIFICANT CHANGE UP
WBC # BLD: 7.47 K/UL — SIGNIFICANT CHANGE UP (ref 4.8–10.8)
WBC # FLD AUTO: 7.47 K/UL — SIGNIFICANT CHANGE UP (ref 4.8–10.8)
WBC UR QL: 404 /HPF — HIGH (ref 0–5)

## 2023-03-06 PROCEDURE — 88112 CYTOPATH CELL ENHANCE TECH: CPT

## 2023-03-06 PROCEDURE — 74177 CT ABD & PELVIS W/CONTRAST: CPT | Mod: 26,MA

## 2023-03-06 PROCEDURE — 97161 PT EVAL LOW COMPLEX 20 MIN: CPT | Mod: GP

## 2023-03-06 PROCEDURE — 82962 GLUCOSE BLOOD TEST: CPT

## 2023-03-06 PROCEDURE — U0003: CPT

## 2023-03-06 PROCEDURE — 80048 BASIC METABOLIC PNL TOTAL CA: CPT

## 2023-03-06 PROCEDURE — 85025 COMPLETE CBC W/AUTO DIFF WBC: CPT

## 2023-03-06 PROCEDURE — 87086 URINE CULTURE/COLONY COUNT: CPT

## 2023-03-06 PROCEDURE — 36415 COLL VENOUS BLD VENIPUNCTURE: CPT

## 2023-03-06 PROCEDURE — 99223 1ST HOSP IP/OBS HIGH 75: CPT

## 2023-03-06 PROCEDURE — 83735 ASSAY OF MAGNESIUM: CPT

## 2023-03-06 PROCEDURE — 86901 BLOOD TYPING SEROLOGIC RH(D): CPT

## 2023-03-06 PROCEDURE — 80061 LIPID PANEL: CPT

## 2023-03-06 PROCEDURE — U0005: CPT

## 2023-03-06 PROCEDURE — 93005 ELECTROCARDIOGRAM TRACING: CPT

## 2023-03-06 PROCEDURE — 90935 HEMODIALYSIS ONE EVALUATION: CPT

## 2023-03-06 PROCEDURE — 83605 ASSAY OF LACTIC ACID: CPT

## 2023-03-06 PROCEDURE — 85027 COMPLETE CBC AUTOMATED: CPT

## 2023-03-06 PROCEDURE — 74178 CT ABD&PLV WO CNTR FLWD CNTR: CPT

## 2023-03-06 PROCEDURE — 84100 ASSAY OF PHOSPHORUS: CPT

## 2023-03-06 PROCEDURE — 86850 RBC ANTIBODY SCREEN: CPT

## 2023-03-06 PROCEDURE — 93010 ELECTROCARDIOGRAM REPORT: CPT

## 2023-03-06 PROCEDURE — 83930 ASSAY OF BLOOD OSMOLALITY: CPT

## 2023-03-06 PROCEDURE — 80053 COMPREHEN METABOLIC PANEL: CPT

## 2023-03-06 PROCEDURE — 86900 BLOOD TYPING SEROLOGIC ABO: CPT

## 2023-03-06 PROCEDURE — 76856 US EXAM PELVIC COMPLETE: CPT

## 2023-03-06 PROCEDURE — 81001 URINALYSIS AUTO W/SCOPE: CPT

## 2023-03-06 PROCEDURE — 94640 AIRWAY INHALATION TREATMENT: CPT

## 2023-03-06 PROCEDURE — 84484 ASSAY OF TROPONIN QUANT: CPT

## 2023-03-06 RX ORDER — AMLODIPINE AND VALSARTAN 5; 320 MG/1; MG/1
1 TABLET, FILM COATED ORAL
Qty: 0 | Refills: 0 | DISCHARGE

## 2023-03-06 RX ORDER — DEXTROSE 50 % IN WATER 50 %
15 SYRINGE (ML) INTRAVENOUS ONCE
Refills: 0 | Status: DISCONTINUED | OUTPATIENT
Start: 2023-03-06 | End: 2023-03-10

## 2023-03-06 RX ORDER — INSULIN GLARGINE 100 [IU]/ML
24 INJECTION, SOLUTION SUBCUTANEOUS AT BEDTIME
Refills: 0 | Status: DISCONTINUED | OUTPATIENT
Start: 2023-03-06 | End: 2023-03-09

## 2023-03-06 RX ORDER — DEXTROSE 50 % IN WATER 50 %
12.5 SYRINGE (ML) INTRAVENOUS ONCE
Refills: 0 | Status: DISCONTINUED | OUTPATIENT
Start: 2023-03-06 | End: 2023-03-10

## 2023-03-06 RX ORDER — ASPIRIN/CALCIUM CARB/MAGNESIUM 324 MG
1 TABLET ORAL
Qty: 0 | Refills: 0 | DISCHARGE

## 2023-03-06 RX ORDER — CALCIUM ACETATE 667 MG
2 TABLET ORAL
Qty: 0 | Refills: 0 | DISCHARGE

## 2023-03-06 RX ORDER — ERYTHROPOIETIN 10000 [IU]/ML
10000 INJECTION, SOLUTION INTRAVENOUS; SUBCUTANEOUS
Refills: 0 | Status: DISCONTINUED | OUTPATIENT
Start: 2023-03-06 | End: 2023-03-10

## 2023-03-06 RX ORDER — ATORVASTATIN CALCIUM 80 MG/1
40 TABLET, FILM COATED ORAL AT BEDTIME
Refills: 0 | Status: DISCONTINUED | OUTPATIENT
Start: 2023-03-06 | End: 2023-03-10

## 2023-03-06 RX ORDER — SODIUM ZIRCONIUM CYCLOSILICATE 10 G/10G
10 POWDER, FOR SUSPENSION ORAL
Refills: 0 | Status: DISCONTINUED | OUTPATIENT
Start: 2023-03-06 | End: 2023-03-06

## 2023-03-06 RX ORDER — METRONIDAZOLE 500 MG
500 TABLET ORAL ONCE
Refills: 0 | Status: COMPLETED | OUTPATIENT
Start: 2023-03-06 | End: 2023-03-06

## 2023-03-06 RX ORDER — INSULIN LISPRO 100/ML
VIAL (ML) SUBCUTANEOUS
Refills: 0 | Status: DISCONTINUED | OUTPATIENT
Start: 2023-03-06 | End: 2023-03-10

## 2023-03-06 RX ORDER — SODIUM CHLORIDE 9 MG/ML
1000 INJECTION, SOLUTION INTRAVENOUS
Refills: 0 | Status: DISCONTINUED | OUTPATIENT
Start: 2023-03-06 | End: 2023-03-10

## 2023-03-06 RX ORDER — LATANOPROST 0.05 MG/ML
1 SOLUTION/ DROPS OPHTHALMIC; TOPICAL AT BEDTIME
Refills: 0 | Status: DISCONTINUED | OUTPATIENT
Start: 2023-03-06 | End: 2023-03-10

## 2023-03-06 RX ORDER — LATANOPROST 0.05 MG/ML
0 SOLUTION/ DROPS OPHTHALMIC; TOPICAL
Qty: 0 | Refills: 0 | DISCHARGE

## 2023-03-06 RX ORDER — EZETIMIBE 10 MG/1
0 TABLET ORAL
Qty: 0 | Refills: 0 | DISCHARGE

## 2023-03-06 RX ORDER — ACETAMINOPHEN 500 MG
650 TABLET ORAL EVERY 6 HOURS
Refills: 0 | Status: DISCONTINUED | OUTPATIENT
Start: 2023-03-06 | End: 2023-03-10

## 2023-03-06 RX ORDER — GLUCAGON INJECTION, SOLUTION 0.5 MG/.1ML
1 INJECTION, SOLUTION SUBCUTANEOUS ONCE
Refills: 0 | Status: DISCONTINUED | OUTPATIENT
Start: 2023-03-06 | End: 2023-03-10

## 2023-03-06 RX ORDER — CALCIUM ACETATE 667 MG
1334 TABLET ORAL
Refills: 0 | Status: DISCONTINUED | OUTPATIENT
Start: 2023-03-06 | End: 2023-03-10

## 2023-03-06 RX ORDER — DEXTROSE 50 % IN WATER 50 %
25 SYRINGE (ML) INTRAVENOUS ONCE
Refills: 0 | Status: DISCONTINUED | OUTPATIENT
Start: 2023-03-06 | End: 2023-03-10

## 2023-03-06 RX ORDER — BRIMONIDINE TARTRATE 2 MG/MG
1 SOLUTION/ DROPS OPHTHALMIC
Refills: 0 | Status: DISCONTINUED | OUTPATIENT
Start: 2023-03-06 | End: 2023-03-10

## 2023-03-06 RX ORDER — LANOLIN ALCOHOL/MO/W.PET/CERES
5 CREAM (GRAM) TOPICAL AT BEDTIME
Refills: 0 | Status: DISCONTINUED | OUTPATIENT
Start: 2023-03-06 | End: 2023-03-10

## 2023-03-06 RX ORDER — INSULIN LISPRO 100/ML
8 VIAL (ML) SUBCUTANEOUS
Refills: 0 | Status: DISCONTINUED | OUTPATIENT
Start: 2023-03-06 | End: 2023-03-09

## 2023-03-06 RX ORDER — METRONIDAZOLE 500 MG
500 TABLET ORAL EVERY 8 HOURS
Refills: 0 | Status: DISCONTINUED | OUTPATIENT
Start: 2023-03-06 | End: 2023-03-07

## 2023-03-06 RX ORDER — CEFEPIME 1 G/1
1000 INJECTION, POWDER, FOR SOLUTION INTRAMUSCULAR; INTRAVENOUS ONCE
Refills: 0 | Status: COMPLETED | OUTPATIENT
Start: 2023-03-06 | End: 2023-03-06

## 2023-03-06 RX ORDER — PANTOPRAZOLE SODIUM 20 MG/1
40 TABLET, DELAYED RELEASE ORAL
Refills: 0 | Status: DISCONTINUED | OUTPATIENT
Start: 2023-03-06 | End: 2023-03-10

## 2023-03-06 RX ORDER — ALBUTEROL 90 UG/1
1 AEROSOL, METERED ORAL EVERY 4 HOURS
Refills: 0 | Status: DISCONTINUED | OUTPATIENT
Start: 2023-03-06 | End: 2023-03-10

## 2023-03-06 RX ORDER — BUDESONIDE AND FORMOTEROL FUMARATE DIHYDRATE 160; 4.5 UG/1; UG/1
2 AEROSOL RESPIRATORY (INHALATION)
Refills: 0 | Status: DISCONTINUED | OUTPATIENT
Start: 2023-03-06 | End: 2023-03-10

## 2023-03-06 RX ORDER — ONDANSETRON 8 MG/1
4 TABLET, FILM COATED ORAL EVERY 8 HOURS
Refills: 0 | Status: DISCONTINUED | OUTPATIENT
Start: 2023-03-06 | End: 2023-03-10

## 2023-03-06 RX ORDER — ASPIRIN/CALCIUM CARB/MAGNESIUM 324 MG
81 TABLET ORAL DAILY
Refills: 0 | Status: DISCONTINUED | OUTPATIENT
Start: 2023-03-06 | End: 2023-03-10

## 2023-03-06 RX ORDER — CIPROFLOXACIN LACTATE 400MG/40ML
400 VIAL (ML) INTRAVENOUS
Refills: 0 | Status: DISCONTINUED | OUTPATIENT
Start: 2023-03-06 | End: 2023-03-07

## 2023-03-06 RX ORDER — ATORVASTATIN CALCIUM 80 MG/1
1 TABLET, FILM COATED ORAL
Qty: 0 | Refills: 0 | DISCHARGE

## 2023-03-06 RX ORDER — AMLODIPINE BESYLATE 2.5 MG/1
5 TABLET ORAL DAILY
Refills: 0 | Status: DISCONTINUED | OUTPATIENT
Start: 2023-03-06 | End: 2023-03-10

## 2023-03-06 RX ORDER — HEPARIN SODIUM 5000 [USP'U]/ML
5000 INJECTION INTRAVENOUS; SUBCUTANEOUS EVERY 8 HOURS
Refills: 0 | Status: DISCONTINUED | OUTPATIENT
Start: 2023-03-06 | End: 2023-03-08

## 2023-03-06 RX ORDER — BRIMONIDINE TARTRATE 2 MG/MG
1 SOLUTION/ DROPS OPHTHALMIC
Qty: 0 | Refills: 0 | DISCHARGE

## 2023-03-06 RX ADMIN — Medication 200 MILLIGRAM(S): at 14:19

## 2023-03-06 RX ADMIN — HEPARIN SODIUM 5000 UNIT(S): 5000 INJECTION INTRAVENOUS; SUBCUTANEOUS at 13:09

## 2023-03-06 RX ADMIN — Medication 8 UNIT(S): at 18:00

## 2023-03-06 RX ADMIN — ATORVASTATIN CALCIUM 40 MILLIGRAM(S): 80 TABLET, FILM COATED ORAL at 22:53

## 2023-03-06 RX ADMIN — Medication 100 MILLIGRAM(S): at 05:13

## 2023-03-06 RX ADMIN — BRIMONIDINE TARTRATE 1 DROP(S): 2 SOLUTION/ DROPS OPHTHALMIC at 05:41

## 2023-03-06 RX ADMIN — Medication 2: at 18:05

## 2023-03-06 RX ADMIN — PANTOPRAZOLE SODIUM 40 MILLIGRAM(S): 20 TABLET, DELAYED RELEASE ORAL at 06:58

## 2023-03-06 RX ADMIN — Medication 8 UNIT(S): at 13:07

## 2023-03-06 RX ADMIN — BUDESONIDE AND FORMOTEROL FUMARATE DIHYDRATE 2 PUFF(S): 160; 4.5 AEROSOL RESPIRATORY (INHALATION) at 08:00

## 2023-03-06 RX ADMIN — Medication 100 MILLIGRAM(S): at 13:09

## 2023-03-06 RX ADMIN — HEPARIN SODIUM 5000 UNIT(S): 5000 INJECTION INTRAVENOUS; SUBCUTANEOUS at 05:41

## 2023-03-06 RX ADMIN — Medication 0: at 13:07

## 2023-03-06 RX ADMIN — BRIMONIDINE TARTRATE 1 DROP(S): 2 SOLUTION/ DROPS OPHTHALMIC at 18:04

## 2023-03-06 RX ADMIN — Medication 1334 MILLIGRAM(S): at 13:00

## 2023-03-06 RX ADMIN — Medication 1334 MILLIGRAM(S): at 19:04

## 2023-03-06 RX ADMIN — CEFEPIME 100 MILLIGRAM(S): 1 INJECTION, POWDER, FOR SOLUTION INTRAMUSCULAR; INTRAVENOUS at 01:05

## 2023-03-06 RX ADMIN — Medication 81 MILLIGRAM(S): at 13:08

## 2023-03-06 RX ADMIN — SODIUM ZIRCONIUM CYCLOSILICATE 10 GRAM(S): 10 POWDER, FOR SUSPENSION ORAL at 05:42

## 2023-03-06 RX ADMIN — SODIUM CHLORIDE 1000 MILLILITER(S): 9 INJECTION, SOLUTION INTRAVENOUS at 01:00

## 2023-03-06 RX ADMIN — Medication 100 MILLIGRAM(S): at 01:05

## 2023-03-06 RX ADMIN — ERYTHROPOIETIN 10000 UNIT(S): 10000 INJECTION, SOLUTION INTRAVENOUS; SUBCUTANEOUS at 10:17

## 2023-03-06 NOTE — CONSULT NOTE ADULT - ASSESSMENT
IMPRESSION:    ARF resolved   HO ESRD on HD.  Missed last HD session  Hyperkalemia   Colitis   HO HFPEF.  Last [id number]   Suspected bladder neoplasm ?    PLAN:    CNS:  No depressants     HEENT: Oral care.     PULMONARY:  HOB @ 45 degrees.  Aspiration precautions.  Wean O2 as tolerated.      CARDIOVASCULAR:  ECHO.  Avoid overload.  Negative balance as tolerated.      GI: GI prophylaxis.  Feeding.  Bowel regimen.  C diff.       RENAL:  Follow up lytes.  Correct as needed.  HD per renal.  Repeat Lytes after HD.  Urology evaluation     INFECTIOUS DISEASE: Follow up cultures.  Procal.  Monitor off ABX for now     HEMATOLOGICAL:  DVT prophylaxis.  Dimer     ENDOCRINE:  Follow up FS.     MUSCULOSKELETAL:  bed chair position.  PT OT     DGTF after HD

## 2023-03-06 NOTE — CONSULT NOTE ADULT - SUBJECTIVE AND OBJECTIVE BOX
Patient is a 83y old  Female who presents with a chief complaint of Hyperkalemia, Colitis (06 Mar 2023 02:48)      HPI:  83F with PMHx of HTN, DM on insulin, asthma, ESRD on HD M/W/F and PSH of JUDAH and cholecystectomy presented to the ED for weakness, vomiting, and diarrhea x1 day. Pt missed her HD session on Friday for family reasons, and was told to follow up on Monday for her next dialysis session. She was doing well until then morning when she started feeling nauseous and had x2 episodes of NBNB vomiting. She also had 4 episodes of loose stools, no hematochezia/hemetemesis. She also endorsed generalized weakness and mild numbness in her LE. EMS was called her to house and she was satting 82% on RA so was placed on NRB, satting 100% now. She denies any CP, palpitations, abd pain, fevers, chills, sick contacts, recent travel, or any  symptoms.     In the ED: BP initially 90/42 -> 112/54, HR 30 -> 82, T 97F, satting 100% on 15L. Labs notable for Na 125, K 6.9, glucose 298, mildly elevated LFTs. VBG pH 7.47 -> 7.28 with lactate uptrending to 3. Trop 0.02, BNP 1800, EKG idioventricular rhythm with bifascular block (old), likely hyperkalemic changes. CT AP shows colitis in descending and sigmoid colon; and urinary bladder wall thickening concerning for neoplasm, visualization recommended.     She received cefepime, flagyl, insulin, albuterol, Ca gluconate, and 2L LR. HR improved from 30s to 80s after hyperK treatment. Nephro consulted for HD pending reply.  (06 Mar 2023 02:48)      PAST MEDICAL & SURGICAL HISTORY:  Asthma, unspecified asthma severity, unspecified whether complicated, unspecified whether persistent      Hypertension affecting pregnancy, antepartum      Type 2 diabetes mellitus without complication, with long-term current use of insulin      No significant past surgical history          SOCIAL HX:   Smoking      No                    ETOH                            Other    FAMILY HISTORY:  :  No known cardiovacular family hisotry     Review Of Systems:     All ROS are negative except per HPI       Allergies    No Known Allergies    Intolerances          PHYSICAL EXAM    ICU Vital Signs Last 24 Hrs  T(C): 36.7 (06 Mar 2023 05:05), Max: 36.7 (06 Mar 2023 05:05)  T(F): 98 (06 Mar 2023 05:05), Max: 98 (06 Mar 2023 05:05)  HR: 75 (06 Mar 2023 05:05) (32 - 75)  BP: 119/54 (06 Mar 2023 05:05) (90/42 - 119/54)  BP(mean): --  ABP: --  ABP(mean): --  RR: 19 (06 Mar 2023 05:05) (18 - 20)  SpO2: 97% (06 Mar 2023 05:05) (93% - 100%)    O2 Parameters below as of 06 Mar 2023 05:05  Patient On (Oxygen Delivery Method): nasal cannula  O2 Flow (L/min): 3          CONSTITUTIONAL:  In NAD    ENT:   Airway patent,   Mouth with normal mucosa.       CARDIAC:   Normal rate,   Regular rhythm.    No edema      RESPIRATORY:   No wheezing  Bilateral BS   Not tachypneic,  No use of accessory muscles    GASTROINTESTINAL:  Abdomen soft,   Non-tender,   No guarding,   + BS      NEUROLOGICAL:   Alert  Follows simple commands   No motor deficits.    SKIN:   Skin normal color for race,   No evidence of rash.                LABS:                          9.1    5.95  )-----------( 227      ( 05 Mar 2023 22:13 )             28.3                                               03-05    125<L>  |  90<L>  |  98<HH>  ----------------------------<  298<H>  6.9<HH>   |  21  |  8.5<HH>    Ca    7.6<L>      05 Mar 2023 22:13    TPro  6.2  /  Alb  3.4<L>  /  TBili  <0.2  /  DBili  x   /  AST  95<H>  /  ALT  57<H>  /  AlkPhos  152<H>  03-05      PT/INR - ( 05 Mar 2023 22:13 )   PT: 10.50 sec;   INR: 0.92 ratio         PTT - ( 05 Mar 2023 22:13 )  PTT:31.4 sec                                       Urinalysis Basic - ( 05 Mar 2023 02:28 )    Color: Yellow / Appearance: Slightly Turbid / S.013 / pH: x  Gluc: x / Ketone: Negative  / Bili: Negative / Urobili: <2 mg/dL   Blood: x / Protein: 300 mg/dL / Nitrite: Negative   Leuk Esterase: Large / RBC: 6 /HPF /  /HPF   Sq Epi: x / Non Sq Epi: 11 /HPF / Bacteria: Moderate        CARDIAC MARKERS ( 05 Mar 2023 22:36 )  x     / 0.02 ng/mL / x     / x     / x                                                LIVER FUNCTIONS - ( 05 Mar 2023 22:13 )  Alb: 3.4 g/dL / Pro: 6.2 g/dL / ALK PHOS: 152 U/L / ALT: 57 U/L / AST: 95 U/L / GGT: x                                                                                                                                       X-Rays reviewed                                                                                     ECHO    CXR interpreted by me     MEDICATIONS  (STANDING):  albuterol    0.083%.. 2.5 milliGRAM(s) Nebulizer every 20 minutes  aspirin  chewable 81 milliGRAM(s) Oral daily  atorvastatin 40 milliGRAM(s) Oral at bedtime  brimonidine 0.2% Ophthalmic Solution 1 Drop(s) Both EYES two times a day  budesonide 160 MICROgram(s)/formoterol 4.5 MICROgram(s) Inhaler 2 Puff(s) Inhalation two times a day  calcium acetate 1334 milliGRAM(s) Oral two times a day with meals  ciprofloxacin   IVPB 400 milliGRAM(s) IV Intermittent <User Schedule>  dextrose 5%. 1000 milliLiter(s) (100 mL/Hr) IV Continuous <Continuous>  dextrose 5%. 1000 milliLiter(s) (50 mL/Hr) IV Continuous <Continuous>  dextrose 50% Injectable 25 Gram(s) IV Push once  dextrose 50% Injectable 12.5 Gram(s) IV Push once  dextrose 50% Injectable 25 Gram(s) IV Push once  glucagon  Injectable 1 milliGRAM(s) IntraMuscular once  heparin   Injectable 5000 Unit(s) SubCutaneous every 8 hours  insulin glargine Injectable (LANTUS) 24 Unit(s) SubCutaneous at bedtime  insulin lispro (ADMELOG) corrective regimen sliding scale   SubCutaneous three times a day before meals  insulin lispro Injectable (ADMELOG) 8 Unit(s) SubCutaneous three times a day before meals  latanoprost 0.005% Ophthalmic Solution 1 Drop(s) Both EYES at bedtime  metroNIDAZOLE  IVPB 500 milliGRAM(s) IV Intermittent every 8 hours  pantoprazole    Tablet 40 milliGRAM(s) Oral before breakfast  sodium zirconium cyclosilicate 10 Gram(s) Oral two times a day    MEDICATIONS  (PRN):  acetaminophen     Tablet .. 650 milliGRAM(s) Oral every 6 hours PRN Temp greater or equal to 38C (100.4F), Mild Pain (1 - 3)  albuterol    90 MICROgram(s) HFA Inhaler 1 Puff(s) Inhalation every 4 hours PRN Bronchospasm  dextrose Oral Gel 15 Gram(s) Oral once PRN Blood Glucose LESS THAN 70 milliGRAM(s)/deciliter  melatonin 5 milliGRAM(s) Oral at bedtime PRN Insomnia  ondansetron Injectable 4 milliGRAM(s) IV Push every 8 hours PRN Nausea and/or Vomiting

## 2023-03-06 NOTE — H&P ADULT - ASSESSMENT
83F with PMHx of HTN, DM on insulin, asthma, ESRD on HD M/W/F and PSH of JUDAH and cholecystectomy presented to the ED for weakness, vomiting, and diarrhea x1 day. Pt missed her HD session on Friday for family reasons, and was told to follow up on Monday for her next dialysis session. She was doing well until then morning when she started feeling nauseous and had x2 episodes of NBNB vomiting. She also had 4 episodes of loose stools and was found to by hypoxic to 82% on RA. Found to have hyperkalemia to 6.9 in ED, treated for hyperkalemia, pending HD.    #Bradycardia likely 2/2 severe hyperkalemia  #Hyponatremia  #Lactic acidosis  #Missed HD session  #ESRD on HD M/W/F  - Missed HD session Friday, today had nausea, vomiting x2, diarrhea x4, and weakness/numbness  - HR 30, EKG showing idioventricular rhythm, improved to 80s after hyperK treatment  - Trop 0.02, trend  - K 6.9 -> 6.5 on VBG, given insulin, Ca gluconate, albuterol  - Lactate 1.8 -> 3 on VBG, trend lactate  - Na 125 on admission, now s/p IVF, send Serum and urine Osm, Fabiola  - Give lokelma 10mg x2   - Mildly elevated LFTs, trend for now, RUQ US if no improvement  - Nephro consult for HD  - Trend BMP closely, keep pacer pads on  - c/w **  - Admit to stepdown    #Sigmoid Colitis  - CT AP: Circumferential wall thickening of distal descending colon and entire sigmoid colon compatible with colitis  - Pt had x2 NBNB vomiting and x4 loose stools today  - s/p cefepime and flagyl in ED  - c/w cipro and flagyl post-HD  - Send GI PCR    #IDDM  - A1c 11.7% in 1/23, f/u repeat  - Pt reports difficult to control blood sugar; went from 120 -> 340 today then took 40u insulin and went down to 300  - BHB -ve, UA -ve for ketones  - c/w insulin basal/bolus + ISS, adjust prn    #Asthma  - c/w **  - Wean O2 to target SpO2 > 94%    #HTN  - c/w **    DVT ppx: subq hep  GI ppx: Protonix  Diet: Renal  Activity: IAT  Dispo: Acute, SDU 83F with PMHx of HTN, DM on insulin, asthma, ESRD on HD M/W/F and PSH of JUDAH and cholecystectomy presented to the ED for weakness, vomiting, and diarrhea x1 day. Pt missed her HD session on Friday for family reasons, and was told to follow up on Monday for her next dialysis session. She was doing well until then morning when she started feeling nauseous and had x2 episodes of NBNB vomiting. She also had 4 episodes of loose stools and was found to by hypoxic to 82% on RA. Found to have hyperkalemia to 6.9 in ED, treated for hyperkalemia, pending HD.    #Bradycardia likely 2/2 severe hyperkalemia  #Hyponatremia  #Lactic acidosis  #Missed HD session  #ESRD on HD M/W/F  - Missed HD session Friday, today had nausea, vomiting x2, diarrhea x4, and weakness/numbness  - HR 30, EKG showing idioventricular rhythm with bifascicular block (present previously), improved to 80s after hyperK treatment  - Trop 0.02, trend  - K 6.9 -> 6.5 on VBG, given insulin, Ca gluconate, albuterol  - Lactate 1.8 -> 3 on VBG, trend lactate  - Na 125 on admission, now s/p IVF, send Serum and urine Osm, Fabiola  - Give lokelma 10mg x2   - Mildly elevated LFTs, trend for now, RUQ US if no improvement  - Nephro consult for HD  - Trend BMP closely, keep pacer pads on  - c/w Ca acetate  - Admit to stepdown    #Sigmoid Colitis  - CT AP: Circumferential wall thickening of distal descending colon and entire sigmoid colon compatible with colitis  - Pt had x2 NBNB vomiting and x4 loose stools today  - s/p cefepime and flagyl in ED  - c/w cipro and flagyl post-HD  - Send GI PCR    #IDDM  - A1c 11.7% in 1/23, f/u repeat  - Pt reports difficult to control blood sugar; went from 120 -> 340 today then took 40u insulin and went down to 300  - BHB -ve, UA -ve for ketones  - c/w insulin basal/bolus + ISS, adjust prn    #Asthma  - c/w symbicort, albuterol PRN  - Wean O2 to target SpO2 > 94%    #HTN  - on home amlodipine 5 and valsartan 160  - Hold BP meds for now    DVT ppx: subq hep  GI ppx: Protonix  Diet: Renal  Activity: IAT  Dispo: Acute, SDU

## 2023-03-06 NOTE — CONSULT NOTE ADULT - ASSESSMENT
ESRD on HD MWF, missed Friday  Sigmoid colitis  Irregular bladder wall thickening  Hyperkalemia  Anemia of CKD  Hyponatremia  Hyperphosphatemia  Hypocalcemia  DM    Plan:    HD today: 3 hours, opti 160 dialyzer, 1K bath, 2.5 L UF  [facility]  [facility] dose [facility]  Check urine culture  Renal diet  Calcium acetate with meals  Urology eval

## 2023-03-06 NOTE — H&P ADULT - NSHPPHYSICALEXAM_GEN_ALL_CORE
General: NAD, AOx3  HEENT: Normocephalic, atraumatic  Lungs: Decreased breath sounds, no wheezes/crackles  Heart: S1s2, no mrg, normal rate  Abdomen: Soft, NT/ND. No rigidity/guarding  Extremities: Peripheral pulses +1, no cyanosis/edema  Neuro: Generalized weakness but no focal deficits  Psych: AOx3, normal affect  Skin: No rashes or bruises

## 2023-03-06 NOTE — H&P ADULT - NSHPREVIEWOFSYSTEMS_GEN_ALL_CORE
CONSTITUTIONAL: (+) weakness, No fevers or chills  EYES/ENT: No visual changes;  No vertigo or throat pain   NECK: No pain or stiffness  RESPIRATORY: No cough, wheezing, hemoptysis; (+) shortness of breath  CARDIOVASCULAR: No chest pain or palpitations  GASTROINTESTINAL: No abdominal or epigastric pain. (+) nausea, (+) NBNB x2 vomiting, No hematemesis; (+) x4 watery diarrhea No constipation. No melena or hematochezia.  GENITOURINARY: No dysuria, frequency or hematuria  NEUROLOGICAL: (+) mild numbness or weakness  SKIN: No itching, rashes

## 2023-03-06 NOTE — H&P ADULT - HISTORY OF PRESENT ILLNESS
83F with PMHx of HTN, DM on insulin, asthma, ESRD on HD M/W/F and PSH of UJDAH and cholecystectomy presented to the ED for weakness, vomiting, and diarrhea x1 day. Pt missed her HD session on Friday for family reasons, and was told to follow up on Monday for her next dialysis session. She was doing well until then morning when she started feeling nauseous and had x2 episodes of NBNB vomiting. She also had 4 episodes of loose stools, no hematochezia/hemetemesis. She also endorsed generalized weakness and mild numbness in her LE. EMS was called her to house and she was satting 82% on RA so was placed on NRB, satting 100% now. She denies any CP, palpitations, abd pain, fevers, chills, sick contacts, recent travel, or any  symptoms.     In the ED: BP initially 90/42 -> 112/54, HR 30 -> 82, T 97F, satting 100% on 15L. Labs notable for Na 125, K 6.9, glucose 298, mildly elevated LFTs. VBG pH 7.47 -> 7.28 with lactate uptrending to 3. Trop 0.02, BNP 1800, EKG idioventricular rhythm with bifascular block (old), likely hyperkalemic changes. CT AP shows colitis in descending and sigmoid colon; and urinary bladder wall thickening concerning for neoplasm, visualization recommended.     She received cefepime, flagyl, insulin, albuterol, Ca gluconate, and 2L LR. HR improved from 30s to 80s after hyperK treatment. Nephro consulted for HD pending reply.

## 2023-03-06 NOTE — H&P ADULT - NSHPLABSRESULTS_GEN_ALL_CORE
< from: CT Abdomen and Pelvis w/ IV Cont (03.06.23 @ 00:18) >    INTERPRETATION:  CLINICAL HISTORY / REASON FOR EXAM: Diarrhea. Abdominal   pain    TECHNIQUE: Contiguous axial CT images were obtained from the lower chest   to the pubic symphysis following the administration of 100 mL Omnipaque   350 intravenous contrast. Oral contrast was not administered. Reformatted   images in the coronal and sagittal planes were acquired.    COMPARISON CT: CT abdomen and pelvis from 12/27/2021.    FINDINGS:    LOWER CHEST: Bibasilar dependent atelectasis.    HEPATOBILIARY: Post cholecystectomy.    SPLEEN: Unremarkable.    PANCREAS: Unremarkable.    ADRENAL GLANDS: Unremarkable.    KIDNEYS: Thinned bilateral renal cortex, left greater than right.   Otherwise, there is symmetric pattern of renal enhancement. No evidence   of hydronephrosis. Left renal cysts.    ABDOMINOPELVIC NODES: Unremarkable.    PELVIC ORGANS: Again seen is irregular urinary bladder wall thickening,   progressed since the prior examination.    PERITONEUM/MESENTERY/BOWEL: Predominantly sigmoid diverticulosis with   mild circumferential wall thickening of the distal descending and entire   sigmoid colon. No bowel obstruction, ascites or intraperitoneal free air.   Normal caliber appendix.    BONES/SOFT TISSUES: Degenerative changes to the thoracolumbar spine.   Stable chronic inferior erosive changes of the T10 vertebral body.    VASCULAR: Calcified atherosclerotic disease within the abdominal aorta.      IMPRESSION:      Again seen is irregular urinary bladder wall thickening, progressed since   the prior examination which is 4 urinary bladder neoplasm. Direct   visualization is recommended.    Mild circumferential wall thickening of the distal descending and entire   sigmoid colon, compatible with colitis in the appropriate clinical   setting.    --- End of Report ---    < end of copied text >

## 2023-03-06 NOTE — PROGRESS NOTE ADULT - SUBJECTIVE AND OBJECTIVE BOX
Patient was examined during HD treatment.    Hemodialysis Prescription:  	Access: AVF  	Dialyzer: Opti 160  	Blood Flow (mL/Min): 400  	Dialysate Flow (mL/Min): 500  	Target UF (Liters): 2.5  	Treatment Time: 3 hours  	Potassium: 1K  	Calcium: 2.5

## 2023-03-06 NOTE — H&P ADULT - ATTENDING COMMENTS
83F with PMHx of HTN, DM on insulin, asthma, ESRD on HD M/W/F and PSH of JUDAH and cholecystectomy presented to the ED for weakness, vomiting, and diarrhea x1 day.    #Hyperkalemia  #Nausea/Vomiting  #ESRD on HD M/W/F  #Missed HD session  #Hyponatremia  nephrology on board  - Plan for HD today  - Monitor electrolytes  - Can DC lokelma  - Cont HD per nephrology  - Downgrade to med/surg    #Sigmoid colitis  CTAP wIV 03/06: Mild circumferential wall thickening of the distal descending and entire sigmoid colon, compatible with colitis  - Continue ciprofloxacin and flagyl IV  - C.diff and GI PCR to be sent if pt has diarrhea    #Urinary bladder wall thickening  CTAP w IV 03/06: irregular bladder wall thicekning, progressed since prior examination which is awhich may be neoplastic  - Urology eval requested    #IDDM  - A1c 11.7% in 1/23, f/u repeat  - Cont insulin regimen    #Asthma  - c/w symbicort, albuterol PRN  - Wean O2 to target SpO2 > 94%    #HTN  - on home amlodipine 5 and valsartan 160  - Resume amlodipine 5mg qD  - Hold valsartan for now    DVT PPX, heparin    #Progress Note Handoff  Pending (specify): Cont IV abx for colitis, urology f/u  Family discussion: dw pt regarding tx for colitis  Disposition: Home

## 2023-03-06 NOTE — CONSULT NOTE ADULT - SUBJECTIVE AND OBJECTIVE BOX
[facility] INITIAL CONSULT NOTE  --------------------------------------------------------------------------------  HPI:  83F with PMHx of HTN, DM on insulin, asthma, ESRD on HD M/W/F and PSH of JUDAH and cholecystectomy presented to the ED for weakness, vomiting, and diarrhea x1 day. Pt missed her HD session on Friday due to diarrhea, and was told to follow up on Monday for her next dialysis session. She was doing well until yesterday morning when she started feeling nauseous and had x2 episodes of NBNB vomiting. She also had 4 episodes of loose stools, no hematochezia/hemetemesis. She also endorsed generalized weakness and mild numbness in her LE. EMS was called her to house and she was satting 82% on RA so was placed on NRB, satting 100% now. She denies any CP, palpitations, abd pain, fevers, chills, sick contacts, recent travel, or any  symptoms.     In the ED: BP initially 90/42 -> 112/54, HR 30 -> 82, T 97F, satting 100% on 15L. Labs notable for Na 125, K 6.9, glucose 298, mildly elevated LFTs. VBG pH 7.47 -> 7.28 with lactate uptrending to 3. Trop 0.02, BNP 1800, EKG idioventricular rhythm with bifascular block (old), likely hyperkalemic changes. CT AP shows colitis in descending and sigmoid colon; and urinary bladder wall thickening concerning for neoplasm, visualization recommended.     She received cefepime, flagyl, insulin, albuterol, Ca gluconate, and 2L LR. HR improved from 30s to 80s after hyperK treatment.     PAST HISTORY  --------------------------------------------------------------------------------  PAST MEDICAL & SURGICAL HISTORY:  Asthma, unspecified asthma severity, unspecified whether complicated, unspecified whether persistent  Hypertension affecting pregnancy, antepartum  Type 2 diabetes mellitus without complication, with long-term current use of insulin  RUE AVF  Cholecystectomy  ESRD    FAMILY HISTORY:  Negative for kidney disease    SOCIAL HISTORY:  No current ETOH, tobacco, or illicit drug use    ALLERGIES & MEDICATIONS  --------------------------------------------------------------------------------  Allergies    No Known Allergies    Standing Inpatient Medications  albuterol    0.083%.. 2.5 milliGRAM(s) Nebulizer every 20 minutes  aspirin  chewable 81 milliGRAM(s) Oral daily  atorvastatin 40 milliGRAM(s) Oral at bedtime  brimonidine 0.2% Ophthalmic Solution 1 Drop(s) Both EYES two times a day  budesonide 160 MICROgram(s)/formoterol 4.5 MICROgram(s) Inhaler 2 Puff(s) Inhalation two times a day  calcium acetate 1334 milliGRAM(s) Oral two times a day with meals  ciprofloxacin   IVPB 400 milliGRAM(s) IV Intermittent <User Schedule>  dextrose 5%. 1000 milliLiter(s) IV Continuous <Continuous>  dextrose 5%. 1000 milliLiter(s) IV Continuous <Continuous>  dextrose 50% Injectable 25 Gram(s) IV Push once  dextrose 50% Injectable 12.5 Gram(s) IV Push once  dextrose 50% Injectable 25 Gram(s) IV Push once  epoetin shannon-epbx (RETACRIT) Injectable 94213 Unit(s) IV Push <User Schedule>  glucagon  Injectable 1 milliGRAM(s) IntraMuscular once  heparin   Injectable 5000 Unit(s) SubCutaneous every 8 hours  insulin glargine Injectable (LANTUS) 24 Unit(s) SubCutaneous at bedtime  insulin lispro (ADMELOG) corrective regimen sliding scale   SubCutaneous three times a day before meals  insulin lispro Injectable (ADMELOG) 8 Unit(s) SubCutaneous three times a day before meals  latanoprost 0.005% Ophthalmic Solution 1 Drop(s) Both EYES at bedtime  metroNIDAZOLE  IVPB 500 milliGRAM(s) IV Intermittent every 8 hours  pantoprazole    Tablet 40 milliGRAM(s) Oral before breakfast  sodium zirconium cyclosilicate 10 Gram(s) Oral two times a day    PRN Inpatient Medications  acetaminophen     Tablet .. 650 milliGRAM(s) Oral every 6 hours PRN  albuterol    90 MICROgram(s) HFA Inhaler 1 Puff(s) Inhalation every 4 hours PRN  dextrose Oral Gel 15 Gram(s) Oral once PRN  melatonin 5 milliGRAM(s) Oral at bedtime PRN  ondansetron Injectable 4 milliGRAM(s) IV Push every 8 hours PRN    REVIEW OF SYSTEMS  --------------------------------------------------------------------------------  Gen: No fevers/chills  Skin: No rashes  Head/Eyes/Ears/Mouth: No headache; No sinus pain/discomfort, sore throat  Respiratory: No dyspnea, cough, wheezing, hemoptysis  CV: No chest pain, PND, orthopnea  GI: No melena, hematochezia  : No increased frequency, dysuria, hematuria, nocturia  All other systems were reviewed and are negative, except as noted.    VITALS/PHYSICAL EXAM  --------------------------------------------------------------------------------  T(C): 36.4 (03-06-23 @ 07:30), Max: 36.7 (03-06-23 @ 05:05)  HR: 67 (03-06-23 @ 08:35) (32 - 75)  BP: 122/57 (03-06-23 @ 08:35) (90/42 - 122/57)  RR: 18 (03-06-23 @ 08:35) (17 - 20)  SpO2: 97% (03-06-23 @ 07:30) (93% - 100%)  Height (cm): 162.6 (03-05-23 @ 21:26)  Weight (kg): 83.915 (03-05-23 @ 21:41)  BMI (kg/m2): 31.7 (03-05-23 @ 21:41)  BSA (m2): 1.89 (03-05-23 @ 21:41)    Physical Exam:  	Gen: NAD  	Pulm: CTA B/L  	CV: RRR, S1S2  	Back: No CVA tenderness; no sacral edema  	Abd: +BS, soft, nontender/nondistended  	: No suprapubic tenderness  	LE: Warm, trace edema  	Neuro: AAO x3  	Vascular access: RUE AVF with good thrill/bruit    LABS/STUDIES  --------------------------------------------------------------------------------              8.5    7.47  >-----------<  232      [03-06-23 @ 09:09]              25.5     125  |  90  |  98  ----------------------------<  298      [03-05-23 @ 22:13]  6.9   |  21  |  8.5        Ca     7.6     [03-05-23 @ 22:13]    TPro  6.2  /  Alb  3.4  /  TBili  <0.2  /  DBili  x   /  AST  95  /  ALT  57  /  AlkPhos  152  [03-05-23 @ 22:13]    PT/INR: PT 10.50, INR 0.92       [03-05-23 @ 22:13]  PTT: 31.4       [03-05-23 @ 22:13]    Troponin 0.02      [03-05-23 @ 22:36]    Creatinine Trend:  SCr 8.5 [03-05 @ 22:13]    Urinalysis - [03-05-23 @ 02:28]      Color Yellow / Appearance Slightly Turbid / SG 1.013 / pH 8.0      Gluc Negative / Ketone Negative  / Bili Negative / Urobili <2 mg/dL       Blood Small / Protein 300 mg/dL / Leuk Est Large / Nitrite Negative      RBC 6 /  / Hyaline 1 / Gran  / Sq Epi  / Non Sq Epi 11 / Bacteria Moderate    HbA1c 8.3      [04-13-18 @ 06:02]    EMIR: titer 1:80, pattern Homogeneous      [04-12-18 @ 16:22]  dsDNA <12      [04-12-18 @ 16:22]  SPEP Interpretation: Normal Electrophoresis Pattern      [04-12-18 @ 20:04]  Immunofixation Urine: No Monoclonal Band Identified      [04-13-18 @ 02:13]  UPEP Interpretation: Mild Selective (Glomerular) Proteinuria      [04-13-18 @ 02:13]

## 2023-03-07 LAB
ANION GAP SERPL CALC-SCNC: 15 MMOL/L — HIGH (ref 7–14)
APPEARANCE UR: ABNORMAL
BACTERIA # UR AUTO: NEGATIVE — SIGNIFICANT CHANGE UP
BILIRUB UR-MCNC: NEGATIVE — SIGNIFICANT CHANGE UP
BLD GP AB SCN SERPL QL: SIGNIFICANT CHANGE UP
BUN SERPL-MCNC: 64 MG/DL — CRITICAL HIGH (ref 10–20)
CALCIUM SERPL-MCNC: 8.7 MG/DL — SIGNIFICANT CHANGE UP (ref 8.4–10.5)
CHLORIDE SERPL-SCNC: 94 MMOL/L — LOW (ref 98–110)
CO2 SERPL-SCNC: 23 MMOL/L — SIGNIFICANT CHANGE UP (ref 17–32)
COLOR SPEC: COLORLESS — SIGNIFICANT CHANGE UP
CREAT SERPL-MCNC: 6.4 MG/DL — CRITICAL HIGH (ref 0.7–1.5)
CULTURE RESULTS: SIGNIFICANT CHANGE UP
DIFF PNL FLD: ABNORMAL
EGFR: 6 ML/MIN/1.73M2 — LOW
EPI CELLS # UR: 2 /HPF — SIGNIFICANT CHANGE UP (ref 0–5)
GLUCOSE BLDC GLUCOMTR-MCNC: 104 MG/DL — HIGH (ref 70–99)
GLUCOSE BLDC GLUCOMTR-MCNC: 107 MG/DL — HIGH (ref 70–99)
GLUCOSE BLDC GLUCOMTR-MCNC: 119 MG/DL — HIGH (ref 70–99)
GLUCOSE BLDC GLUCOMTR-MCNC: 125 MG/DL — HIGH (ref 70–99)
GLUCOSE BLDC GLUCOMTR-MCNC: 241 MG/DL — HIGH (ref 70–99)
GLUCOSE BLDC GLUCOMTR-MCNC: 51 MG/DL — CRITICAL LOW (ref 70–99)
GLUCOSE BLDC GLUCOMTR-MCNC: 73 MG/DL — SIGNIFICANT CHANGE UP (ref 70–99)
GLUCOSE BLDC GLUCOMTR-MCNC: 87 MG/DL — SIGNIFICANT CHANGE UP (ref 70–99)
GLUCOSE BLDC GLUCOMTR-MCNC: 94 MG/DL — SIGNIFICANT CHANGE UP (ref 70–99)
GLUCOSE SERPL-MCNC: 128 MG/DL — HIGH (ref 70–99)
GLUCOSE UR QL: NEGATIVE — SIGNIFICANT CHANGE UP
HCT VFR BLD CALC: 30.3 % — LOW (ref 37–47)
HGB BLD-MCNC: 10 G/DL — LOW (ref 12–16)
HYALINE CASTS # UR AUTO: 2 /LPF — SIGNIFICANT CHANGE UP (ref 0–7)
KETONES UR-MCNC: NEGATIVE — SIGNIFICANT CHANGE UP
LEUKOCYTE ESTERASE UR-ACNC: ABNORMAL
MAGNESIUM SERPL-MCNC: 1.9 MG/DL — SIGNIFICANT CHANGE UP (ref 1.8–2.4)
MCHC RBC-ENTMCNC: 30.9 PG — SIGNIFICANT CHANGE UP (ref 27–31)
MCHC RBC-ENTMCNC: 33 G/DL — SIGNIFICANT CHANGE UP (ref 32–37)
MCV RBC AUTO: 93.5 FL — SIGNIFICANT CHANGE UP (ref 81–99)
NITRITE UR-MCNC: NEGATIVE — SIGNIFICANT CHANGE UP
NRBC # BLD: 0 /100 WBCS — SIGNIFICANT CHANGE UP (ref 0–0)
PH UR: 8.5 — HIGH (ref 5–8)
PLATELET # BLD AUTO: 266 K/UL — SIGNIFICANT CHANGE UP (ref 130–400)
POTASSIUM SERPL-MCNC: 6 MMOL/L — CRITICAL HIGH (ref 3.5–5)
POTASSIUM SERPL-SCNC: 6 MMOL/L — CRITICAL HIGH (ref 3.5–5)
PROT UR-MCNC: ABNORMAL
RBC # BLD: 3.24 M/UL — LOW (ref 4.2–5.4)
RBC # FLD: 15.7 % — HIGH (ref 11.5–14.5)
RBC CASTS # UR COMP ASSIST: 262 /HPF — HIGH (ref 0–4)
SODIUM SERPL-SCNC: 132 MMOL/L — LOW (ref 135–146)
SP GR SPEC: 1.01 — SIGNIFICANT CHANGE UP (ref 1.01–1.03)
SPECIMEN SOURCE: SIGNIFICANT CHANGE UP
TROPONIN T SERPL-MCNC: 0.15 NG/ML — CRITICAL HIGH
TROPONIN T SERPL-MCNC: 0.16 NG/ML — CRITICAL HIGH
UROBILINOGEN FLD QL: SIGNIFICANT CHANGE UP
WBC # BLD: 7.35 K/UL — SIGNIFICANT CHANGE UP (ref 4.8–10.8)
WBC # FLD AUTO: 7.35 K/UL — SIGNIFICANT CHANGE UP (ref 4.8–10.8)
WBC UR QL: 307 /HPF — HIGH (ref 0–5)

## 2023-03-07 PROCEDURE — 93010 ELECTROCARDIOGRAM REPORT: CPT | Mod: 77

## 2023-03-07 PROCEDURE — 76856 US EXAM PELVIC COMPLETE: CPT | Mod: 26

## 2023-03-07 PROCEDURE — 93010 ELECTROCARDIOGRAM REPORT: CPT

## 2023-03-07 PROCEDURE — 74178 CT ABD&PLV WO CNTR FLWD CNTR: CPT | Mod: 26

## 2023-03-07 PROCEDURE — 99233 SBSQ HOSP IP/OBS HIGH 50: CPT

## 2023-03-07 RX ORDER — CIPROFLOXACIN LACTATE 400MG/40ML
500 VIAL (ML) INTRAVENOUS DAILY
Refills: 0 | Status: DISCONTINUED | OUTPATIENT
Start: 2023-03-07 | End: 2023-03-10

## 2023-03-07 RX ORDER — HALOPERIDOL DECANOATE 100 MG/ML
3 INJECTION INTRAMUSCULAR ONCE
Refills: 0 | Status: COMPLETED | OUTPATIENT
Start: 2023-03-07 | End: 2023-03-07

## 2023-03-07 RX ORDER — DEXTROSE 50 % IN WATER 50 %
12.5 SYRINGE (ML) INTRAVENOUS ONCE
Refills: 0 | Status: COMPLETED | OUTPATIENT
Start: 2023-03-07 | End: 2023-03-07

## 2023-03-07 RX ORDER — METRONIDAZOLE 500 MG
500 TABLET ORAL THREE TIMES A DAY
Refills: 0 | Status: DISCONTINUED | OUTPATIENT
Start: 2023-03-07 | End: 2023-03-10

## 2023-03-07 RX ORDER — INFLUENZA VIRUS VACCINE 15; 15; 15; 15 UG/.5ML; UG/.5ML; UG/.5ML; UG/.5ML
0.7 SUSPENSION INTRAMUSCULAR ONCE
Refills: 0 | Status: DISCONTINUED | OUTPATIENT
Start: 2023-03-07 | End: 2023-03-10

## 2023-03-07 RX ADMIN — BUDESONIDE AND FORMOTEROL FUMARATE DIHYDRATE 2 PUFF(S): 160; 4.5 AEROSOL RESPIRATORY (INHALATION) at 00:54

## 2023-03-07 RX ADMIN — Medication 81 MILLIGRAM(S): at 13:26

## 2023-03-07 RX ADMIN — Medication 500 MILLIGRAM(S): at 21:56

## 2023-03-07 RX ADMIN — HEPARIN SODIUM 5000 UNIT(S): 5000 INJECTION INTRAVENOUS; SUBCUTANEOUS at 21:57

## 2023-03-07 RX ADMIN — BRIMONIDINE TARTRATE 1 DROP(S): 2 SOLUTION/ DROPS OPHTHALMIC at 17:23

## 2023-03-07 RX ADMIN — HEPARIN SODIUM 5000 UNIT(S): 5000 INJECTION INTRAVENOUS; SUBCUTANEOUS at 00:53

## 2023-03-07 RX ADMIN — ATORVASTATIN CALCIUM 40 MILLIGRAM(S): 80 TABLET, FILM COATED ORAL at 21:57

## 2023-03-07 RX ADMIN — BUDESONIDE AND FORMOTEROL FUMARATE DIHYDRATE 2 PUFF(S): 160; 4.5 AEROSOL RESPIRATORY (INHALATION) at 20:28

## 2023-03-07 RX ADMIN — Medication 4: at 08:08

## 2023-03-07 RX ADMIN — LATANOPROST 1 DROP(S): 0.05 SOLUTION/ DROPS OPHTHALMIC; TOPICAL at 21:59

## 2023-03-07 RX ADMIN — Medication 500 MILLIGRAM(S): at 13:24

## 2023-03-07 RX ADMIN — LATANOPROST 1 DROP(S): 0.05 SOLUTION/ DROPS OPHTHALMIC; TOPICAL at 00:58

## 2023-03-07 RX ADMIN — Medication 1334 MILLIGRAM(S): at 08:07

## 2023-03-07 RX ADMIN — HEPARIN SODIUM 5000 UNIT(S): 5000 INJECTION INTRAVENOUS; SUBCUTANEOUS at 05:56

## 2023-03-07 RX ADMIN — AMLODIPINE BESYLATE 5 MILLIGRAM(S): 2.5 TABLET ORAL at 05:56

## 2023-03-07 RX ADMIN — Medication 1334 MILLIGRAM(S): at 17:25

## 2023-03-07 RX ADMIN — Medication 8 UNIT(S): at 08:08

## 2023-03-07 RX ADMIN — PANTOPRAZOLE SODIUM 40 MILLIGRAM(S): 20 TABLET, DELAYED RELEASE ORAL at 06:03

## 2023-03-07 RX ADMIN — HEPARIN SODIUM 5000 UNIT(S): 5000 INJECTION INTRAVENOUS; SUBCUTANEOUS at 13:26

## 2023-03-07 NOTE — PROVIDER CONTACT NOTE (OTHER) - BACKGROUND
Pt experiencing large amounts of vaginal bleeding throughout day. Dr. Renee aware, Dr. Alcala LORI came to see pt bedside, vaginal exam performed by Dr. Alcala, nothing remarkable reported. GYN notified

## 2023-03-07 NOTE — PROVIDER CONTACT NOTE (OTHER) - ASSESSMENT
and straightcath done per GYN to see where blood is coming from. Straight cath performed under sterile technique and showed all blood when catether entered into urethra, approximately 5 cc of blood came out. Transvaginal US ordered

## 2023-03-07 NOTE — CHART NOTE - NSCHARTNOTEFT_GEN_A_CORE
Notified by RN around 18:00 that pt had a large amount of blood in the toilet when she went to urinate. She also felt light headed when returning to bed where she had to be assisted into a chair to avoid falling. At the time, /67, HR 77, SpO2 100 on RA. . Pt AOx2 to self and place but not year. She is also anxious  Vaginal exam was performed. Vagina was smooth, no palpable masses or lesions. Urethra visualized. Difficult to determine if blood was from urethra vs vagina.   Spoke with gynecology who recommended straight cath. Straight cath performed and put out about 2cc of gross blood. Bleed likely from of  origin given pt has suspected urinary bladder neoplasm.   Pt is pending CT urogram and TVUS.   Gyn asked to be reconsulted if services required in the future. Notified by RN around 18:00 that pt had a large amount of blood in the toilet when she went to urinate. She also felt light headed when returning to bed where she had to be assisted into a chair to avoid falling. At the time, /67, HR 77, SpO2 100 on RA. . Pt AOx2 to self and place but not year. She is also anxious  Vaginal exam was performed. Vagina was smooth, no palpable masses or lesions. Urethra visualized. Difficult to determine if blood was from urethra vs vagina.   Spoke with gynecology who recommended straight cath. Straight cath performed and put out <5cc of gross blood. Bleed likely from of  origin given pt has suspected urinary bladder neoplasm.   Pt is pending CT urogram and TVUS.   Gyn asked to be reconsulted if services required in the future.

## 2023-03-07 NOTE — PROGRESS NOTE ADULT - SUBJECTIVE AND OBJECTIVE BOX
[place] NE[facility]ROLOGY FOLLOW UP NOTE  --------------------------------------------------------------------------------  24 hour events/subjective: Patient examined. Appears comfortable.    PAST HISTORY  --------------------------------------------------------------------------------  No significant changes to PMH, PSH, FHx, SHx, unless otherwise noted    ALLERGIES & MEDICATIONS  --------------------------------------------------------------------------------  Allergies    No Known Allergies    Standing Inpatient Medications  albuterol    0.083% 2.5 milliGRAM(s) Nebulizer every 20 minutes  amLODIPine   Tablet 5 milliGRAM(s) Oral daily  aspirin  chewable 81 milliGRAM(s) Oral daily  atorvastatin 40 milliGRAM(s) Oral at bedtime  brimonidine 0.2% Ophthalmic Solution 1 Drop(s) Both EYES two times a day  budesonide 160 MICROgram(s)/formoterol 4.5 MICROgram(s) Inhaler 2 Puff(s) Inhalation two times a day  calcium acetate 1334 milliGRAM(s) Oral two times a day with meals  ciprofloxacin     Tablet 500 milliGRAM(s) Oral daily  dextrose 5%. 1000 milliLiter(s) IV Continuous <Continuous>  dextrose 5%. 1000 milliLiter(s) IV Continuous <Continuous>  dextrose 50% Injectable 25 Gram(s) IV Push once  dextrose 50% Injectable 12.5 Gram(s) IV Push once  dextrose 50% Injectable 25 Gram(s) IV Push once  epoetin shannon-epbx (RETACRIT) Injectable 52664 Unit(s) IV Push <User Schedule>  glucagon  Injectable 1 milliGRAM(s) IntraMuscular once  heparin   Injectable 5000 Unit(s) SubCutaneous every 8 hours  influenza  Vaccine (HIGH DOSE) 0.7 milliLiter(s) IntraMuscular once  insulin glargine Injectable (LANTUS) 24 Unit(s) SubCutaneous at bedtime  insulin lispro (ADMELOG) corrective regimen sliding scale   SubCutaneous three times a day before meals  insulin lispro Injectable (ADMELOG) 8 Unit(s) SubCutaneous three times a day before meals  latanoprost 0.005% Ophthalmic Solution 1 Drop(s) Both EYES at bedtime  metroNIDAZOLE    Tablet 500 milliGRAM(s) Oral three times a day  pantoprazole    Tablet 40 milliGRAM(s) Oral before breakfast    PRN Inpatient Medications  acetaminophen     Tablet .. 650 milliGRAM(s) Oral every 6 hours PRN  albuterol    90 MICROgram(s) HFA Inhaler 1 Puff(s) Inhalation every 4 hours PRN  dextrose Oral Gel 15 Gram(s) Oral once PRN  melatonin 5 milliGRAM(s) Oral at bedtime PRN  ondansetron Injectable 4 milliGRAM(s) IV Push every 8 hours PRN    VITALS/PHYSICAL EXAM  --------------------------------------------------------------------------------  T(C): 36.1 (03-07-23 @ 13:15), Max: 36.5 (03-07-23 @ 00:07)  HR: 78 (03-07-23 @ 13:15) (78 - 84)  BP: 134/64 (03-07-23 @ 13:15) (128/60 - 151/70)  RR: 18 (03-07-23 @ 13:15) (17 - 18)  SpO2: 99% (03-07-23 @ 01:11) (99% - 100%)  Height (cm): 162.6 (03-05-23 @ 21:26)  Weight (kg): 83.915 (03-05-23 @ 21:41)  BMI (kg/m2): 31.7 (03-05-23 @ 21:41)  BSA (m2): 1.89 (03-05-23 @ 21:41)    03-06-23 @ 07:01  -  03-07-23 @ 07:00  --------------------------------------------------------  IN: 0 mL / OUT: 2500 mL / NET: -2500 mL    Physical Exam:  	Gen: NAD  	Pulm: CTA B/L  	CV: RRR, S1S2  	Abd: +BS, soft, nontender/nondistended  	: No suprapubic tenderness  	LE: Warm, no edema  	Vascular access: AVF    LABS/STUDIES  --------------------------------------------------------------------------------              8.5    7.47  >-----------<  232      [03-06-23 @ 09:09]              25.5     127  |  91  |  96  ----------------------------<  178      [03-06-23 @ 09:09]  6.1   |  21  |  8.1        Ca     7.7     [03-06-23 @ 09:09]      Mg     2.0     [03-06-23 @ 09:09]    TPro  5.8  /  Alb  3.1  /  TBili  <0.2  /  DBili  x   /  AST  59  /  ALT  48  /  AlkPhos  121  [03-06-23 @ 09:09]    PT/INR: PT 10.50, INR 0.92       [03-05-23 @ 22:13]  PTT: 31.4       [03-05-23 @ 22:13]    Troponin 0.16      [03-07-23 @ 12:20]  Serum Osmolality 309      [03-06-23 @ 09:09]    Creatinine Trend:  SCr 8.1 [03-06 @ 09:09]  SCr 8.5 [03-05 @ 22:13]    Urinalysis - [03-07-23 @ 10:37]      Color Colorless / Appearance Slightly Turbid / SG 1.008 / pH 8.5      Gluc Negative / Ketone Negative  / Bili Negative / Urobili <2 mg/dL       Blood Moderate / Protein 100 mg/dL / Leuk Est Large / Nitrite Negative       /  / Hyaline 2 / Gran  / Sq Epi  / Non Sq Epi 2 / Bacteria Negative    HbA1c 8.3      [04-13-18 @ 06:02]  Lipid: chol 143, TG 73, HDL 42, LDL --      [03-06-23 @ 09:09]

## 2023-03-07 NOTE — CONSULT NOTE ADULT - ASSESSMENT
Pt is a 84yo Female who with PMH of  HTN, HLD, DM on insulin, asthma, ESRD on HD M/W/F and PSH of TAHBSO 2/2 Ovarian Ca , s/p cholecystectomy p/w weakness, vomiting and diarrhea -  c/s for suspected urinary bladder neoplasm on CT. CT A/P with IVC shows Again seen is irregular urinary bladder wall thickening, progressed since the prior examination which is 4 urinary bladder neoplasm. On previous CT in 12/27/21 note w/ Evidence of multiple incidental urinary bladder nodules throughout anterior and posterior walls on noncontrast CT. Findings may represent urinary bladder neoplasm.    Plan:   - Obtain CT Urogram to evaluate bladder lesion, coordinate with dialysis   - Continue IV abx   - UCx- <10,000 CFU/mL Normal Urogenital Fidelina  - Urine cytology,   - F/u OP with  cystoscopy for further evaluation   - Will d/w attending    Pt is a 84yo Female who with PMH of  HTN, HLD, DM on insulin, asthma, ESRD on HD M/W/F and PSH of TAHBSO 2/2 Ovarian Ca , s/p cholecystectomy p/w weakness, vomiting and diarrhea -  c/s for suspected urinary bladder neoplasm on CT. CT A/P with IVC shows Again seen is irregular urinary bladder wall thickening, progressed since the prior examination which is 4 urinary bladder neoplasm. On previous CT in 12/27/21 note w/ Evidence of multiple incidental urinary bladder nodules throughout anterior and posterior walls on noncontrast CT. Findings may represent urinary bladder neoplasm.    Plan:   - Obtain CT Urogram to evaluate bladder lesion, coordinate with dialysis   - Continue IV abx   - UCx- <10,000 CFU/mL Normal Urogenital Fidelina  - Urine cytology,   - F/u OP with Dr. Larson for outpatient cystoscopy for further evaluation   - Will d/w attending

## 2023-03-07 NOTE — PROVIDER CONTACT NOTE (OTHER) - SITUATION
Pt walking back from bathroom when she got dizzy. PCA and RNA sat pt in chair. /67 HR 77 spo2 100% .

## 2023-03-07 NOTE — PROGRESS NOTE ADULT - SUBJECTIVE AND OBJECTIVE BOX
JULEE TOLLIVER 83y Female  MRN#: [id number]   CODE STATUS:________    Hospital Day: 1d    Pt is currently admitted with the primary diagnosis of hyperkalmia     SUBJECTIVE  Hospital course   83F with PMHx of HTN, DM on insulin, asthma, ESRD on HD M/W/F and PSH of JUDAH and cholecystectomy presented to the ED for weakness, vomiting, and diarrhea x1 day. Pt missed her HD session on Friday for family reasons, and was told to follow up on Monday for her next dialysis session. She was doing well until then morning when she started feeling nauseous and had x2 episodes of NBNB vomiting. She also had 4 episodes of loose stools, no hematochezia/hemetemesis. She also endorsed generalized weakness and mild numbness in her LE. EMS was called her to house and she was satting 82% on RA so was placed on NRB, satting 100% now. She denies any CP, palpitations, abd pain, fevers, chills, sick contacts, recent travel, or any  symptoms.     In the ED: BP initially 90/42 -> 112/54, HR 30 -> 82, T 97F, satting 100% on 15L. Labs notable for Na 125, K 6.9, glucose 298, mildly elevated LFTs. VBG pH 7.47 -> 7.28 with lactate uptrending to 3. Trop 0.02, BNP 1800, EKG idioventricular rhythm with bifascular block (old), likely hyperkalemic changes. CT AP shows colitis in descending and sigmoid colon; and urinary bladder wall thickening concerning for neoplasm, visualization recommended.     She received cefepime, flagyl, insulin, albuterol, Ca gluconate, and 2L LR. HR improved from 30s to 80s after hyperK treatment. Pt urgently dialyzed. labs improved    3/7 pt with new onset vaginal bleed vs hematuria        Overnight events: none    Subjective complaints: Pt refusing labs and IV placement     Present Today:   - Colunga:  No [ x ], Yes [   ] : Indication:     - Type of IV Access:       .. CVC/Piccline:  No [ x ], Yes [   ] : Indication:       .. Midline: No [ x ], Yes [   ] : Indication:                                             ----------------------------------------------------------  OBJECTIVE  PAST MEDICAL & SURGICAL HISTORY  Asthma, unspecified asthma severity, unspecified whether complicated, unspecified whether persistent    Hypertension affecting pregnancy, antepartum    Type 2 diabetes mellitus without complication, with long-term current use of insulin    No significant past surgical history                                              -----------------------------------------------------------  ALLERGIES:  No Known Allergies                                            ------------------------------------------------------------    HOME MEDICATIONS  Home Medications:  albuterol 90 mcg/inh inhalation aerosol: 1 puff(s) inhaled every 4 hours (06 Mar 2023 02:40)  amlodipine-valsartan 5 mg-160 mg oral tablet: 1 tab(s) orally once a day (06 Mar 2023 02:40)  aspirin 81 mg oral tablet: 1 tab(s) orally once a day (06 Mar 2023 02:40)  atorvastatin 40 mg oral tablet: 1 tab(s) orally once a day (at bedtime) (06 Mar 2023 02:40)  BRIMONIDINE TARTRATE  0.15 % SOLN: 1 application to each affected eye 2 times a day (06 Mar 2023 02:40)  CALCIUM ACETATE  667 MG CAPS: 2 cap(s) orally 2 times a day (with meals) (06 Mar 2023 02:40)  EZETIMIBE  10 MG TABS:  (06 Mar 2023 02:40)  LATANOPROST  0.005 % SOLN: 1 drop in each eye at bedtime for glaucoma (06 Mar 2023 02:40)                           MEDICATIONS:  STANDING MEDICATIONS  albuterol    0.083%.. 2.5 milliGRAM(s) Nebulizer every 20 minutes  amLODIPine   Tablet 5 milliGRAM(s) Oral daily  aspirin  chewable 81 milliGRAM(s) Oral daily  atorvastatin 40 milliGRAM(s) Oral at bedtime  brimonidine 0.2% Ophthalmic Solution 1 Drop(s) Both EYES two times a day  budesonide 160 MICROgram(s)/formoterol 4.5 MICROgram(s) Inhaler 2 Puff(s) Inhalation two times a day  calcium acetate 1334 milliGRAM(s) Oral two times a day with meals  ciprofloxacin     Tablet 500 milliGRAM(s) Oral daily  dextrose 5%. 1000 milliLiter(s) IV Continuous <Continuous>  dextrose 5%. 1000 milliLiter(s) IV Continuous <Continuous>  dextrose 50% Injectable 25 Gram(s) IV Push once  dextrose 50% Injectable 12.5 Gram(s) IV Push once  dextrose 50% Injectable 25 Gram(s) IV Push once  epoetin shannon-epbx (RETACRIT) Injectable 43905 Unit(s) IV Push <User Schedule>  glucagon  Injectable 1 milliGRAM(s) IntraMuscular once  heparin   Injectable 5000 Unit(s) SubCutaneous every 8 hours  influenza  Vaccine (HIGH DOSE) 0.7 milliLiter(s) IntraMuscular once  insulin glargine Injectable (LANTUS) 24 Unit(s) SubCutaneous at bedtime  insulin lispro (ADMELOG) corrective regimen sliding scale   SubCutaneous three times a day before meals  insulin lispro Injectable (ADMELOG) 8 Unit(s) SubCutaneous three times a day before meals  latanoprost 0.005% Ophthalmic Solution 1 Drop(s) Both EYES at bedtime  metroNIDAZOLE    Tablet 500 milliGRAM(s) Oral three times a day  pantoprazole    Tablet 40 milliGRAM(s) Oral before breakfast    PRN MEDICATIONS  acetaminophen     Tablet .. 650 milliGRAM(s) Oral every 6 hours PRN  albuterol    90 MICROgram(s) HFA Inhaler 1 Puff(s) Inhalation every 4 hours PRN  dextrose Oral Gel 15 Gram(s) Oral once PRN  melatonin 5 milliGRAM(s) Oral at bedtime PRN  ondansetron Injectable 4 milliGRAM(s) IV Push every 8 hours PRN                                            ------------------------------------------------------------    03-06-23 @ 07:01  -  03-07-23 @ 07:00  --------------------------------------------------------  IN: 0 mL / OUT: 2500 mL / NET: -2500 mL                                                 --------------------------------------------------------------  LABS:                        8.5    7.47  )-----------( 232      ( 06 Mar 2023 09:09 )             25.5     03-06    127<L>  |  91<L>  |  96<HH>  ----------------------------<  178<H>  6.1<HH>   |  21  |  8.1<HH>    Ca    7.7<L>      06 Mar 2023 09:09  Mg     2.0     03-06    TPro  5.8<L>  /  Alb  3.1<L>  /  TBili  <0.2  /  DBili  x   /  AST  59<H>  /  ALT  48<H>  /  AlkPhos  121<H>  03-06    PT/INR - ( 05 Mar 2023 22:13 )   PT: 10.50 sec;   INR: 0.92 ratio         PTT - ( 05 Mar 2023 22:13 )  PTT:31.4 sec            Culture - Urine (collected 05 Mar 2023 02:28)  Source: Clean Catch Clean Catch (Midstream)  Final Report (07 Mar 2023 08:39):    <10,000 CFU/mL Normal Urogenital Fidelina        CARDIAC MARKERS ( 06 Mar 2023 09:09 )  x     / 0.04 ng/mL / x     / x     / x      CARDIAC MARKERS ( 05 Mar 2023 22:36 )  x     / 0.02 ng/mL / x     / x     / x                                                    -------------------------------------------------------------  RADIOLOGY:  CTAP Again seen is irregular urinary bladder wall thickening, progressed since   the prior examination which is 4 urinary bladder neoplasm. Direct   visualization is recommended.    Mild circumferential wall thickening of the distal descending and entire   sigmoid colon, compatible with colitis in the appropriate clinical   setting.                                            --------------------------------------------------------------  VITAL SIGNS: Last 24 Hours  T(C): 36 (07 Mar 2023 05:00), Max: 36.7 (06 Mar 2023 15:00)  T(F): 96.8 (07 Mar 2023 05:00), Max: 98.1 (06 Mar 2023 15:00)  HR: 84 (07 Mar 2023 02:00) (69 - 84)  BP: 143/64 (07 Mar 2023 05:00) (119/63 - 151/70)  BP(mean): --  RR: 18 (07 Mar 2023 05:00) (16 - 18)  SpO2: 99% (07 Mar 2023 01:11) (99% - 100%)    PHYSICAL EXAM:  General: Awake, oriented and resting comfortably, no acute distress  Head: normocephalic, atraumatic  ENT:  moist mucous membranes  Cardiac: regular rate and rhythm, normal S1 and S2, no murmurs, rubs or gallops  Respiratory: clear to auscultation bilaterally, no wheezes, crackles or rhonchi, unlabored respirations  Abdomen: normoactive bowel sounds x4, non tender, nondistended  Ext:  No edema,   Neuro: A&O x3, no focal neurological deficits                                                --------------------------------------------------------------    ASSESSMENT & PLAN    Past medical history and hospital course       83F with PMHx of HTN, DM on insulin, asthma, ESRD on HD M/W/F and PSH of JUDAH and cholecystectomy presented to the ED for weakness, vomiting, and diarrhea x1 day.    #Hyperkalemia  #Nausea/Vomiting  #ESRD on HD M/W/F  #Missed HD session  #Hyponatremia  nephrology on board  - s/p HD yesterday  - Monitor electrolytes  - Cont HD per nephrology    #vaginal bleed vs hematuria  - UA   -gyn consult  -TV US     #Sigmoid colitis  CTAP wIV 03/06: Mild circumferential wall thickening of the distal descending and entire sigmoid colon, compatible with colitis  - Continue ciprofloxacin and flagyl IV  - C.diff and GI PCR to be sent if pt has diarrhea    #Urinary bladder wall thickening  CTAP w IV 03/06: irregular bladder wall thickening, progressed since prior examination which is which may be neoplastic  - Urology eval requested    #IDDM  - A1c 11.7% in 1/23, f/u repeat  - Cont insulin regimen    #Asthma  - c/w symbicort, albuterol PRN  - Wean O2 to target SpO2 > 94%    #HTN  - on home amlodipine 5 and valsartan 160  - Resume amlodipine 5mg qD  - Hold valsartan for now    DVT PPX, heparin    #Progress Note Handoff  Pending (specify): Cont IV abx for colitis, urology f/u  Family discussion: dw pt regarding tx for colitis  Disposition: Home .          # Handoff

## 2023-03-07 NOTE — PROGRESS NOTE ADULT - ASSESSMENT
83F with PMHx of HTN, DM on insulin, asthma, ESRD on HD M/W/F and PSH of JUDAH and cholecystectomy presented to the ED for weakness, vomiting, and diarrhea x1 day.    #Nausea/vomiting/diarrhea 2/2 Colitis  -    #ESRD on HD (m/w/f)  #missed HD prior to admission 2/2 not feeling well--->fluid overload and hyperkalemia  #CT with bladder wall thickness (increased from prior studies)--concern for underlying bladder malignancy  #vaginal vs urinary bleeding with clots  #troponemia--2/2 ESRD  -continue HD and f/u nephrology recomm  -urology consult---plan for CT urogram (will need to coordinate with HD)  -monitor cbc q 12 hr--transfuse if hb <8   -check TVUS (patient s/p JUDAH)---GYN consult  -check UA and Ucx  -monitor electrolytes  -trop 0.02--->0.16 today---repeat stat EKG And repeat trop and monitor on tele  -cardiology consult   -patient already on asa, statin    #Sigmoid colitis  -CTAP 03/06: Mild circumferential wall thickening of the distal descending and entire sigmoid colon, compatible with colitis  -continue IV cipro/flagyl  -monitor electrolytes   -currently without diarrhea---if develops then sent stool cdiff and stool pcr       #IDDM  - A1c 11.7 in january 2023  -f/u repeat hba1c  -continue lantus 24 u qhs and lispro 8 u with meals  -monitor fs qac/qhs    #HTN  - on home amlodipine 5 and valsartan 160  - Resume amlodipine 5mg qD and monitor bp closely      DVT/GI ppx  guarded prognosis    FULL CODE    patient lives at home with family and she recently moved to [place] from [place] (all of her prior physicians are in [place])     #Progress Note Handoff  Pending (specify):  repeat trop and ekg, tele monitoring, cbc (to monitor hb)- CT urogram  Family discussion: d/w patient and family (son and grandson bedside)  Disposition:  Unknown at this time__x______    Total time spent to complete patient's bedside assessment, review medical chart, discuss medical plan of care with covering medical team was more than 50 minutes  with >50% of time spendt face to face with patient, discussion with patient/family and/or coordination of care 83F with PMHx of HTN, DM on insulin, asthma, ESRD on HD M/W/F and PSH of JUDAH and cholecystectomy presented to the ED for weakness, vomiting, and diarrhea x1 day.    #Nausea/vomiting/diarrhea 2/2 Colitis  -    #ESRD on HD (m/w/f)  #missed HD prior to admission 2/2 not feeling well--->fluid overload and hyperkalemia  #CT with bladder wall thickness (increased from prior studies)--possible underlying bladder malignancy  #new onset  vs GYN bleeding  #troponemia (initially likely 2/2 ESRD trop 0.06--->0.16)  -continue HD and f/u nephrology recomm  -patient initially admitted to SDU--now downgraded to regular floor overnight  -f/u pulm/crit care recommendation  -closely monitor and re-evaluate for upgrade to more  monitored setting if becomes hemodynamically unstable  -urology consult---plan for CT urogram (will need to coordinate with HD)  -monitor cbc q 12 hr--transfuse if hb <8   -check TVUS (patient s/p JUDAH)---GYN consult  -check UA and Ucx  -monitor electrolytes  -trop 0.02--->0.16 today---repeat stat EKG And trop and upgrade to tele   -cont asa, statin    #Sigmoid colitis  -CTAP 03/06: Mild circumferential wall thickening of the distal descending and entire sigmoid colon, compatible with colitis  -continue IV cipro/flagyl  -monitor electrolytes   -currently without diarrhea---if develops then sent stool cdiff and stool pcr       #IDDM  - A1c 11.7 in january 2023  -f/u repeat hba1c  -continue lantus 24 u qhs and lispro 8 u with meals  -monitor fs qac/qhs    #HTN  - on home amlodipine 5 and valsartan 160  - Resume amlodipine 5mg qD and monitor bp closely      DVT/GI ppx  guarded prognosis    FULL CODE    patient lives at home with family and she recently moved to [place] from [place] (all of her prior physicians are in [place])     #Progress Note Handoff  Pending (specify):  repeat trop and ekg, tele monitoring, cbc (to monitor hb)- CT urogram  Family discussion: d/w patient and family (son and grandson bedside)  Disposition:  Unknown at this time__x______    Total time spent to complete patient's bedside assessment, review medical chart, discuss medical plan of care with covering medical team was more than 50 minutes  with >50% of time spendt face to face with patient, discussion with patient/family and/or coordination of care

## 2023-03-07 NOTE — PROGRESS NOTE ADULT - SUBJECTIVE AND OBJECTIVE BOX
Patient is a 83y old  Female who presents with a chief complaint of Hyperkalemia, Colitis (07 Mar 2023 10:48)    HPI:  83F with PMHx of HTN, DM on insulin, asthma, ESRD on HD M/W/F and PSH of JUDAH and cholecystectomy presented to the ED for weakness, vomiting, and diarrhea x1 day. Pt missed her HD session on Friday for family reasons, and was told to follow up on Monday for her next dialysis session. She was doing well until then morning when she started feeling nauseous and had x2 episodes of NBNB vomiting. She also had 4 episodes of loose stools, no hematochezia/hemetemesis. She also endorsed generalized weakness and mild numbness in her LE. EMS was called her to house and she was satting 82% on RA so was placed on NRB, satting 100% now. She denies any CP, palpitations, abd pain, fevers, chills, sick contacts, recent travel, or any  symptoms.     In the ED: BP initially 90/42 -> 112/54, HR 30 -> 82, T 97F, satting 100% on 15L. Labs notable for Na 125, K 6.9, glucose 298, mildly elevated LFTs. VBG pH 7.47 -> 7.28 with lactate uptrending to 3. Trop 0.02, BNP 1800, EKG idioventricular rhythm with bifascular block (old), likely hyperkalemic changes. CT AP shows colitis in descending and sigmoid colon; and urinary bladder wall thickening concerning for neoplasm, visualization recommended.     She received cefepime, flagyl, insulin, albuterol, Ca gluconate, and 2L LR. HR improved from 30s to 80s after hyperK treatment. Nephro consulted for HD pending reply.  (06 Mar 2023 02:48)    PAST MEDICAL & SURGICAL HISTORY:  Asthma, unspecified asthma severity, unspecified whether complicated, unspecified whether persistent  Hypertension affecting pregnancy, antepartum  Type 2 diabetes mellitus without complication, with long-term current use of insulin  No significant past surgical history    patient seen and examined independently on morning rounds for the first time today, chart reviewed and discussed with the medicine resident and on interdisciplinary rounds.    episode of blood (clots) after urinating today-- no similar prior episodes- she refused blood work this morning- at time of exam patient was tearful and crying- family bedside (son and grandson) and discussed with them at length- still c/o abdominal pain (RUQ and epigastric    Vital Signs Last 24 Hrs  T(C): 36.1 (07 Mar 2023 13:15), Max: 36.7 (06 Mar 2023 15:00)  T(F): 97 (07 Mar 2023 13:15), Max: 98.1 (06 Mar 2023 15:00)  HR: 78 (07 Mar 2023 13:15) (70 - 84)  BP: 134/64 (07 Mar 2023 13:15) (119/63 - 151/70)  BP(mean): --  RR: 18 (07 Mar 2023 13:15) (16 - 18)  SpO2: 99% (07 Mar 2023 01:11) (99% - 100%)    Parameters below as of 07 Mar 2023 02:00  Patient On (Oxygen Delivery Method): room air    PE:  GEN-NAD, AAOx3, tearful  NECK- supple no jvd  PULM- fair air entry  CVS- +s1/s2 RRR   GI- soft +ttp diffuse-+bs, no rebound, no guarding  EXT- +edema                          8.5    7.47  )-----------( 232      ( 06 Mar 2023 09:09 )             25.5     03-06    127<L>  |  91<L>  |  96<HH>  ----------------------------<  178<H>  6.1<HH>   |  21  |  8.1<HH>    Ca    7.7<L>      06 Mar 2023 09:09  Mg     2.0     03-06    TPro  5.8<L>  /  Alb  3.1<L>  /  TBili  <0.2  /  DBili  x   /  AST  59<H>  /  ALT  48<H>  /  AlkPhos  121<H>  03-06    CARDIAC MARKERS ( 07 Mar 2023 12:20 )  x     / 0.16 ng/mL / x     / x     / x      CARDIAC MARKERS ( 06 Mar 2023 09:09 )  x     / 0.04 ng/mL / x     / x     / x      CARDIAC MARKERS ( 05 Mar 2023 22:36 )  x     / 0.02 ng/mL / x     / x     / x          Urinalysis Basic - ( 07 Mar 2023 10:37 )    Color: Colorless / Appearance: Slightly Turbid / S.008 / pH: x  Gluc: x / Ketone: Negative  / Bili: Negative / Urobili: <2 mg/dL   Blood: x / Protein: 100 mg/dL / Nitrite: Negative   Leuk Esterase: Large / RBC: 262 /HPF /  /HPF   Sq Epi: x / Non Sq Epi: 2 /HPF / Bacteria: Negative        Culture - Urine (collected 05 Mar 2023 02:28)  Source: Clean Catch Clean Catch (Midstream)  Final Report (07 Mar 2023 08:39):    <10,000 CFU/mL Normal Urogenital Fidelina        MEDICATIONS  (STANDING):  albuterol    0.083%.. 2.5 milliGRAM(s) Nebulizer every 20 minutes  amLODIPine   Tablet 5 milliGRAM(s) Oral daily  aspirin  chewable 81 milliGRAM(s) Oral daily  atorvastatin 40 milliGRAM(s) Oral at bedtime  brimonidine 0.2% Ophthalmic Solution 1 Drop(s) Both EYES two times a day  budesonide 160 MICROgram(s)/formoterol 4.5 MICROgram(s) Inhaler 2 Puff(s) Inhalation two times a day  calcium acetate 1334 milliGRAM(s) Oral two times a day with meals  ciprofloxacin     Tablet 500 milliGRAM(s) Oral daily  dextrose 5%. 1000 milliLiter(s) (100 mL/Hr) IV Continuous <Continuous>  dextrose 5%. 1000 milliLiter(s) (50 mL/Hr) IV Continuous <Continuous>  dextrose 50% Injectable 25 Gram(s) IV Push once  dextrose 50% Injectable 12.5 Gram(s) IV Push once  dextrose 50% Injectable 25 Gram(s) IV Push once  epoetin shannon-epbx (RETACRIT) Injectable 74438 Unit(s) IV Push <User Schedule>  glucagon  Injectable 1 milliGRAM(s) IntraMuscular once  heparin   Injectable 5000 Unit(s) SubCutaneous every 8 hours  influenza  Vaccine (HIGH DOSE) 0.7 milliLiter(s) IntraMuscular once  insulin glargine Injectable (LANTUS) 24 Unit(s) SubCutaneous at bedtime  insulin lispro (ADMELOG) corrective regimen sliding scale   SubCutaneous three times a day before meals  insulin lispro Injectable (ADMELOG) 8 Unit(s) SubCutaneous three times a day before meals  latanoprost 0.005% Ophthalmic Solution 1 Drop(s) Both EYES at bedtime  metroNIDAZOLE    Tablet 500 milliGRAM(s) Oral three times a day  pantoprazole    Tablet 40 milliGRAM(s) Oral before breakfast     Patient is a 83y old  Female who presents with a chief complaint of Hyperkalemia, Colitis (07 Mar 2023 10:48)    HPI:  83F with PMHx of HTN, DM on insulin, asthma, ESRD on HD M/W/F and PSH of JUDAH and cholecystectomy presented to the ED for weakness, vomiting, and diarrhea x1 day. Pt missed her HD session on Friday for family reasons, and was told to follow up on Monday for her next dialysis session. She was doing well until then morning when she started feeling nauseous and had x2 episodes of NBNB vomiting. She also had 4 episodes of loose stools, no hematochezia/hemetemesis. She also endorsed generalized weakness and mild numbness in her LE. EMS was called her to house and she was satting 82% on RA so was placed on NRB, satting 100% now. She denies any CP, palpitations, abd pain, fevers, chills, sick contacts, recent travel, or any  symptoms.     In the ED: BP initially 90/42 -> 112/54, HR 30 -> 82, T 97F, satting 100% on 15L. Labs notable for Na 125, K 6.9, glucose 298, mildly elevated LFTs. VBG pH 7.47 -> 7.28 with lactate uptrending to 3. Trop 0.02, BNP 1800, EKG idioventricular rhythm with bifascular block (old), likely hyperkalemic changes. CT AP shows colitis in descending and sigmoid colon; and urinary bladder wall thickening concerning for neoplasm, visualization recommended.     She received cefepime, flagyl, insulin, albuterol, Ca gluconate, and 2L LR. HR improved from 30s to 80s after hyperK treatment. Nephro consulted for HD pending reply.  (06 Mar 2023 02:48)    PAST MEDICAL & SURGICAL HISTORY:  Asthma, unspecified asthma severity, unspecified whether complicated, unspecified whether persistent  Hypertension affecting pregnancy, antepartum  Type 2 diabetes mellitus without complication, with long-term current use of insulin  No significant past surgical history    patient seen and examined independently on morning rounds for the first time today, chart reviewed and discussed with the medicine resident and on interdisciplinary rounds.    episode of blood (clots) after urinating today--? unclear source ( vs gyn) no similar prior episodes- she refused blood work this morning- at time of exam patient was tearful and crying- family bedside (son and grandson) and discussed with them at length- still c/o abdominal pain (RUQ and epigastric    Vital Signs Last 24 Hrs  T(C): 36.1 (07 Mar 2023 13:15), Max: 36.7 (06 Mar 2023 15:00)  T(F): 97 (07 Mar 2023 13:15), Max: 98.1 (06 Mar 2023 15:00)  HR: 78 (07 Mar 2023 13:15) (70 - 84)  BP: 134/64 (07 Mar 2023 13:15) (119/63 - 151/70)  BP(mean): --  RR: 18 (07 Mar 2023 13:15) (16 - 18)  SpO2: 99% (07 Mar 2023 01:11) (99% - 100%)    Parameters below as of 07 Mar 2023 02:00  Patient On (Oxygen Delivery Method): room air    PE:  GEN-NAD, AAOx3, tearful  NECK- supple no jvd  PULM- fair air entry  CVS- +s1/s2 RRR   GI- soft +ttp diffuse-+bs, no rebound, no guarding  EXT- +trace LE Edema, +RUE AVF with +thrill/bruit                          8.5    7.47  )-----------( 232      ( 06 Mar 2023 09:09 )             25.5     03-06    127<L>  |  91<L>  |  96<HH>  ----------------------------<  178<H>  6.1<HH>   |  21  |  8.1<HH>    Ca    7.7<L>      06 Mar 2023 09:09  Mg     2.0     03-06    TPro  5.8<L>  /  Alb  3.1<L>  /  TBili  <0.2  /  DBili  x   /  AST  59<H>  /  ALT  48<H>  /  AlkPhos  121<H>  03-06    CARDIAC MARKERS ( 07 Mar 2023 12:20 )  x     / 0.16 ng/mL / x     / x     / x      CARDIAC MARKERS ( 06 Mar 2023 09:09 )  x     / 0.04 ng/mL / x     / x     / x      CARDIAC MARKERS ( 05 Mar 2023 22:36 )  x     / 0.02 ng/mL / x     / x     / x          Urinalysis Basic - ( 07 Mar 2023 10:37 )    Color: Colorless / Appearance: Slightly Turbid / S.008 / pH: x  Gluc: x / Ketone: Negative  / Bili: Negative / Urobili: <2 mg/dL   Blood: x / Protein: 100 mg/dL / Nitrite: Negative   Leuk Esterase: Large / RBC: 262 /HPF /  /HPF   Sq Epi: x / Non Sq Epi: 2 /HPF / Bacteria: Negative        Culture - Urine (collected 05 Mar 2023 02:28)  Source: Clean Catch Clean Catch (Midstream)  Final Report (07 Mar 2023 08:39):    <10,000 CFU/mL Normal Urogenital Fidelina        MEDICATIONS  (STANDING):  albuterol    0.083%.. 2.5 milliGRAM(s) Nebulizer every 20 minutes  amLODIPine   Tablet 5 milliGRAM(s) Oral daily  aspirin  chewable 81 milliGRAM(s) Oral daily  atorvastatin 40 milliGRAM(s) Oral at bedtime  brimonidine 0.2% Ophthalmic Solution 1 Drop(s) Both EYES two times a day  budesonide 160 MICROgram(s)/formoterol 4.5 MICROgram(s) Inhaler 2 Puff(s) Inhalation two times a day  calcium acetate 1334 milliGRAM(s) Oral two times a day with meals  ciprofloxacin     Tablet 500 milliGRAM(s) Oral daily  dextrose 5%. 1000 milliLiter(s) (100 mL/Hr) IV Continuous <Continuous>  dextrose 5%. 1000 milliLiter(s) (50 mL/Hr) IV Continuous <Continuous>  dextrose 50% Injectable 25 Gram(s) IV Push once  dextrose 50% Injectable 12.5 Gram(s) IV Push once  dextrose 50% Injectable 25 Gram(s) IV Push once  epoetin shannon-epbx (RETACRIT) Injectable 83143 Unit(s) IV Push <User Schedule>  glucagon  Injectable 1 milliGRAM(s) IntraMuscular once  heparin   Injectable 5000 Unit(s) SubCutaneous every 8 hours  influenza  Vaccine (HIGH DOSE) 0.7 milliLiter(s) IntraMuscular once  insulin glargine Injectable (LANTUS) 24 Unit(s) SubCutaneous at bedtime  insulin lispro (ADMELOG) corrective regimen sliding scale   SubCutaneous three times a day before meals  insulin lispro Injectable (ADMELOG) 8 Unit(s) SubCutaneous three times a day before meals  latanoprost 0.005% Ophthalmic Solution 1 Drop(s) Both EYES at bedtime  metroNIDAZOLE    Tablet 500 milliGRAM(s) Oral three times a day  pantoprazole    Tablet 40 milliGRAM(s) Oral before breakfast

## 2023-03-07 NOTE — PROGRESS NOTE ADULT - ASSESSMENT
ESRD on HD MWF, missed Friday  Sigmoid colitis  Irregular bladder wall thickening  Hyperkalemia  Anemia of CKD  Hyponatremia  Hyperphosphatemia  Hypocalcemia  DM    Plan:    HD tomorrow: 3 hours, opti 160 dialyzer, 1K bath, 2.5 L UF  DC [facility]  Renally dose antibiotics  Renal diet  Calcium acetate with meals  Urology f/u

## 2023-03-07 NOTE — PATIENT PROFILE ADULT - PATIENT'S SEXUAL ORIENTATION
History  Chief Complaint   Patient presents with    Knee Pain     Missed a step on loading dock at work, right knee collided with dock - swelling and stiffness     Patient is a 59-year-old male with past medical history of coronary artery disease, hypertension, hyperlipidemia, GERD, presents to the emergency department after he sustained a right knee injury at work  Patient states he missed a step at a loading dock and his right leg got caught on the loading dock but the rest of his body fell forward into an area that was about 2 feet below the loading dock  He states he felt a twist and a cracking sensation in his knee  He was able to catch himself with his hands and denies any head injury or loss of consciousness from the fall  He has been having pain and swelling as well as stiffness in the right knee since the injury  He reports this occurred at around 12:30 p m  He denies any prior injury to the knee  He localizes the pain to the lateral and anterior aspect of the lower knee  Denies any radiation of pain  He has noticed that the knee joint appears slightly more swollen  He reports when he is sitting or not moving the knee tends to stiffen and locked up on him  He has been able to bear weight but it is more painful and he has been limping  He has not taken anything for pain as of yet  He denies any other injuries or complaints at this time and rest of review of systems is otherwise negative  History provided by:  Patient   used: No        Prior to Admission Medications   Prescriptions Last Dose Informant Patient Reported? Taking? Coenzyme Q10 (CO Q 10) 10 MG CAPS   Yes No   Sig: Take by mouth   Omega-3 Fatty Acids (CVS FISH OIL) 1200 MG CAPS   Yes No   Sig: CVS Fish Oil 1200 MG Oral Capsule; 0; 0; 06-Feb-2014;  Active   aspirin 81 MG tablet   Yes No   Sig: Take by mouth   nebivolol (BYSTOLIC) 10 mg tablet   Yes No   Sig: Take 1 tablet by mouth daily   ranitidine (ZANTAC) 150 mg tablet   Yes No   Sig: Take 1 tablet by mouth daily   ranolazine (RANEXA) 500 mg 12 hr tablet   Yes No   Sig: Take 500 mg by mouth 2 (two) times a day   rosuvastatin (CRESTOR) 20 MG tablet   No No   Sig: Take 1 tablet (20 mg total) by mouth daily      Facility-Administered Medications: None       Past Medical History:   Diagnosis Date    CAD (coronary artery disease) 2009    50% RCA stenosis on coronary angiogram     GERD (gastroesophageal reflux disease)     Hyperlipidemia     Hypertension     Obesity     Schatzki's ring 02/07/2014       Past Surgical History:   Procedure Laterality Date    CARDIAC CATHETERIZATION  2009    COLONOSCOPY  2010    ESOPHAGOGASTRODUODENOSCOPY         Family History   Problem Relation Age of Onset    Coronary artery disease Father     Heart attack Father 40    Arrhythmia Father     Cancer Maternal Aunt      GI     I have reviewed and agree with the history as documented  Social History   Substance Use Topics    Smoking status: Never Smoker    Smokeless tobacco: Never Used    Alcohol use Yes      Comment: 5 a week        Review of Systems   Constitutional: Negative for chills and fever  HENT: Negative for congestion, ear pain, hearing loss, rhinorrhea, sore throat and tinnitus  Eyes: Negative for photophobia, pain and visual disturbance  Respiratory: Negative for cough, shortness of breath and wheezing  Cardiovascular: Negative for chest pain and palpitations  Gastrointestinal: Negative for abdominal pain, constipation, diarrhea, nausea and vomiting  Genitourinary: Negative for dysuria, flank pain, frequency and hematuria  Musculoskeletal: Positive for joint swelling  Negative for back pain, neck pain and neck stiffness  + Right knee pain/swelling s/p injury   Skin: Positive for wound  Negative for color change, pallor and rash  Allergic/Immunologic: Negative for immunocompromised state     Neurological: Negative for dizziness, weakness, light-headedness, numbness and headaches  Hematological: Does not bruise/bleed easily  Psychiatric/Behavioral: Negative for confusion and decreased concentration  All other systems reviewed and are negative  Physical Exam  ED Triage Vitals   Temperature Pulse Respirations Blood Pressure SpO2   05/02/18 1604 05/02/18 1604 05/02/18 1604 05/02/18 1604 05/02/18 1604   98 1 °F (36 7 °C) 75 20 133/84 94 %      Temp Source Heart Rate Source Patient Position - Orthostatic VS BP Location FiO2 (%)   05/02/18 1604 05/02/18 1604 05/02/18 1604 05/02/18 1735 --   Oral Monitor Sitting Right arm       Pain Score       05/02/18 1604       5         Vitals:    05/02/18 1604 05/02/18 1735   BP: 133/84    Pulse: 75 67   Resp: 20 19   Temp: 98 1 °F (36 7 °C)    TempSrc: Oral    SpO2: 94% 97%   Weight: 90 7 kg (200 lb)    Height: 5' 6 5" (1 689 m)      Physical Exam   Constitutional: He is oriented to person, place, and time  He appears well-developed and well-nourished  No distress  HENT:   Head: Normocephalic and atraumatic  Mouth/Throat: Oropharynx is clear and moist    Eyes: Conjunctivae and EOM are normal  Pupils are equal, round, and reactive to light  Neck: Normal range of motion  Neck supple  No JVD present  Cardiovascular: Normal rate, regular rhythm, normal heart sounds and intact distal pulses  Exam reveals no gallop and no friction rub  No murmur heard  Pulmonary/Chest: Effort normal and breath sounds normal  No respiratory distress  He has no wheezes  He has no rales  Abdominal: Soft  Bowel sounds are normal  He exhibits no distension  There is no tenderness  There is no rebound and no guarding  Musculoskeletal: Normal range of motion  He exhibits edema  He exhibits no tenderness or deformity  RIGHT LOWER EXTREMITY:  No obvious right knee deformity  No significant tenderness around the knee but there is a mild right knee joint effusion    Superficial abrasion over the anterior and lateral aspect of the knee  No major joint instability  Negative anterior and posterior draw test   Negative Lachman's test   Patient able to flex and fully extend the knee against gravity  No other tenderness above or below the knee joint  Right lower extremity neurovascularly intact  ALL OTHER EXTREMITIES: atraumatic, non-tender, FROM  SPINE: No midline C/T/L spine tenderness  Neurological: He is alert and oriented to person, place, and time  No gross motor or sensory deficits  Skin: Skin is warm and dry  No rash noted  He is not diaphoretic  No pallor  Superficial abrasion over the anterior and lateral aspect of the right knee  No laceration  Psychiatric: He has a normal mood and affect  His behavior is normal  Thought content normal    Nursing note and vitals reviewed  ED Medications  Medications   ibuprofen (MOTRIN) tablet 600 mg (600 mg Oral Given 5/2/18 1627)   acetaminophen (TYLENOL) tablet 650 mg (650 mg Oral Given 5/2/18 1627)       Diagnostic Studies  Results Reviewed     None                 XR knee 4+ vw right injury   Final Result by Ousmane Castillo MD (05/02 1721)      No visible fracture identified, please see CT performed concurrently  Small joint effusion  Workstation performed: [id number]         CT knee right wo contrast   Final Result by Andrew Boyle MD (05/02 1721)      Acute appearing nondisplaced fracture of the lateral tibial plateau with no significant depression  Small hemorrhagic knee joint effusion  Workstation performed: [id number]                    Procedures  Procedures       Phone Contacts  ED Phone Contact    ED Course  ED Course as of May 02 1810   Wed May 02, 2018   1731 Updated patient about CT scan finding  Will place in a knee immobilizer and provide crutches  Advised patient to remain nonweightbearing on the right lower extremity until he follows up with orthopedic surgery  Discussed ED return parameters  MDM  Number of Diagnoses or Management Options  Diagnosis management comments: 59-year-old male presents status post right knee injury with pain, swelling and stiffness  Differential includes acute fracture, ligamentous injury, knee sprain  Will obtain x-ray as well as CT scan of the right knee  Patient drove to the emergency department so unable to provide narcotic pain medication but will give ibuprofen and Tylenol as well as ice for pain control  Will ultimately refer to Orthopedic surgery for follow-up  Will likely give patient a knee immobilizer as well as crutches to go home with  Amount and/or Complexity of Data Reviewed  Tests in the radiology section of CPT®: ordered and reviewed  Independent visualization of images, tracings, or specimens: yes      CritCare Time    Disposition  Final diagnoses:   Tibial plateau fracture, right, closed, initial encounter   Right knee injury, initial encounter     Time reflects when diagnosis was documented in both MDM as applicable and the Disposition within this note     Time User Action Codes Description Comment    5/2/2018  5:32 PM Jun Lambert [[id number]] Tibial plateau fracture, right, closed, initial encounter     5/2/2018  5:32 PM Jun Lambert [[id number]] Right knee injury, initial encounter       ED Disposition     ED Disposition Condition Comment    Discharge  Elana Cisneros discharge to home/self care      Condition at discharge: Stable        Follow-up Information     Follow up With Specialties Details Why Contact Info Additional [phone] [facility] [place] [facility] Schedule an appointment as soon as possible for a visit  [address] [facility] Shailesh Sanchez [address] [phone]     [facility] Emergency Medicine Go to If symptoms worsen [address]  [phone] [place] ED, [address], [facility], [address] [place] [address], [id number]        Discharge Medication List as of 5/2/2018  5:36 PM      START taking these medications    Details   HYDROcodone-acetaminophen (NORCO) 5-325 mg per tablet Take 1 tablet by mouth every 6 (six) hours as needed (severe pain) for up to 10 days Max Daily Amount: 4 tablets, Starting Wed 5/2/2018, Until Sat 5/12/2018, Print      naproxen (NAPROSYN) 500 mg tablet Take 1 tablet (500 mg total) by mouth every 12 (twelve) hours as needed for mild pain or moderate pain, Starting Wed 5/2/2018, Print         CONTINUE these medications which have NOT CHANGED    Details   aspirin 81 MG tablet Take by mouth, Historical Med      Coenzyme Q10 (CO Q 10) 10 MG CAPS Take by mouth, Historical Med      nebivolol (BYSTOLIC) 10 mg tablet Take 1 tablet by mouth daily, Starting Tue 7/15/2014, Historical Med      Omega-3 Fatty Acids (CVS FISH OIL) 1200 MG CAPS CVS Fish Oil 1200 MG Oral Capsule; 0; 0; 06-Feb-2014; Active, Historical Med      ranitidine (ZANTAC) 150 mg tablet Take 1 tablet by mouth daily, Starting Tue 12/9/2014, Historical Med      ranolazine (RANEXA) 500 mg 12 hr tablet Take 500 mg by mouth 2 (two) times a day, Historical Med      rosuvastatin (CRESTOR) 20 MG tablet Take 1 tablet (20 mg total) by mouth daily, Starting u 3/8/2018, Normal           No discharge procedures on file      ED Provider  Electronically Signed by           Marianna Montgomery,   05/02/18 [id number] Misty Harper,   05/02/18 [id number] Heterosexual

## 2023-03-07 NOTE — PATIENT PROFILE ADULT - FALL HARM RISK - RISK INTERVENTIONS

## 2023-03-07 NOTE — CONSULT NOTE ADULT - SUBJECTIVE AND OBJECTIVE BOX
Pt is a 84yo Female who with PMH of  HTN, HLD, DM on insulin, asthma, ESRD on HD M/W/F and PSH of TAHBSO 2/2 Ovarian Ca , s/p cholecystectomy p/w weakness, vomiting and diarrhea -  c/s for suspected urinary bladder neoplasm on CT.     PAST MEDICAL & SURGICAL HISTORY:  Asthma, unspecified asthma severity, unspecified whether complicated, unspecified whether persistent      Hypertension affecting pregnancy, antepartum      Type 2 diabetes mellitus without complication, with long-term current use of insulin      No significant past surgical history      MEDICATIONS  (STANDING):  albuterol    0.083%.. 2.5 milliGRAM(s) Nebulizer every 20 minutes  amLODIPine   Tablet 5 milliGRAM(s) Oral daily  aspirin  chewable 81 milliGRAM(s) Oral daily  atorvastatin 40 milliGRAM(s) Oral at bedtime  brimonidine 0.2% Ophthalmic Solution 1 Drop(s) Both EYES two times a day  budesonide 160 MICROgram(s)/formoterol 4.5 MICROgram(s) Inhaler 2 Puff(s) Inhalation two times a day  calcium acetate 1334 milliGRAM(s) Oral two times a day with meals  ciprofloxacin     Tablet 500 milliGRAM(s) Oral daily  dextrose 5%. 1000 milliLiter(s) (100 mL/Hr) IV Continuous <Continuous>  dextrose 5%. 1000 milliLiter(s) (50 mL/Hr) IV Continuous <Continuous>  dextrose 50% Injectable 25 Gram(s) IV Push once  dextrose 50% Injectable 12.5 Gram(s) IV Push once  dextrose 50% Injectable 25 Gram(s) IV Push once  epoetin shannon-epbx (RETACRIT) Injectable 86112 Unit(s) IV Push <User Schedule>  glucagon  Injectable 1 milliGRAM(s) IntraMuscular once  heparin   Injectable 5000 Unit(s) SubCutaneous every 8 hours  influenza  Vaccine (HIGH DOSE) 0.7 milliLiter(s) IntraMuscular once  insulin glargine Injectable (LANTUS) 24 Unit(s) SubCutaneous at bedtime  insulin lispro (ADMELOG) corrective regimen sliding scale   SubCutaneous three times a day before meals  insulin lispro Injectable (ADMELOG) 8 Unit(s) SubCutaneous three times a day before meals  latanoprost 0.005% Ophthalmic Solution 1 Drop(s) Both EYES at bedtime  metroNIDAZOLE    Tablet 500 milliGRAM(s) Oral three times a day  pantoprazole    Tablet 40 milliGRAM(s) Oral before breakfast    MEDICATIONS  (PRN):  acetaminophen     Tablet .. 650 milliGRAM(s) Oral every 6 hours PRN Temp greater or equal to 38C (100.4F), Mild Pain (1 - 3)  albuterol    90 MICROgram(s) HFA Inhaler 1 Puff(s) Inhalation every 4 hours PRN Bronchospasm  dextrose Oral Gel 15 Gram(s) Oral once PRN Blood Glucose LESS THAN 70 milliGRAM(s)/deciliter  melatonin 5 milliGRAM(s) Oral at bedtime PRN Insomnia  ondansetron Injectable 4 milliGRAM(s) IV Push every 8 hours PRN Nausea and/or Vomiting      Allergies    No Known Allergies    Intolerances          SOCIAL HISTORY:    FAMILY HISTORY:      REVIEW OF SYSTEMS   [ ] A ten-point review of systems was otherwise negative except as noted.  [ ] Due to altered mental status/intubation, subjective information were not able to be obtained from patient. History was obtained, to the extent possible, from review of the chart and collateral sources of information.    Vital Signs Last 24 Hrs  T(C): 36 (07 Mar 2023 05:00), Max: 36.7 (06 Mar 2023 15:00)  T(F): 96.8 (07 Mar 2023 05:00), Max: 98.1 (06 Mar 2023 15:00)  HR: 84 (07 Mar 2023 02:00) (69 - 84)  BP: 143/64 (07 Mar 2023 05:00) (119/63 - 151/70)  RR: 18 (07 Mar 2023 05:00) (16 - 18)  SpO2: 99% (07 Mar 2023 01:11) (99% - 100%)    Parameters below as of 07 Mar 2023 02:00  Patient On (Oxygen Delivery Method): room air    LABS:                        8.5    7.47  )-----------( 232      ( 06 Mar 2023 09:09 )             25.5     03-06    127<L>  |  91<L>  |  96<HH>  ----------------------------<  178<H>  6.1<HH>   |  21  |  8.1<HH>    Ca    7.7<L>      06 Mar 2023 09:09  Mg     2.0     03-06    TPro  5.8<L>  /  Alb  3.1<L>  /  TBili  <0.2  /  DBili  x   /  AST  59<H>  /  ALT  48<H>  /  AlkPhos  121<H>  03-06    PT/INR - ( 05 Mar 2023 22:13 )   PT: 10.50 sec;   INR: 0.92 ratio         PTT - ( 05 Mar 2023 22:13 )  PTT:31.4 sec    Urinalysis (03.05.23 @ 02:28)   Blood, Urine: Small   pH Urine: 8.0   Glucose Qualitative, Urine: Negative   Color: Yellow   Urine Appearance: Slightly Turbid   Bilirubin: Negative   Ketone - Urine: Negative   Specific Gravity: 1.013   Protein, Urine: 300 mg/dL   Urobilinogen: <2 mg/dL   Nitrite: Negative   Leukocyte Esterase Concentration: Large     Culture - Urine (03.05.23 @ 02:28)    Specimen Source: Clean Catch Clean Catch (Midstream)    Culture Results:   <10,000 CFU/mL Normal Urogenital Fidelina          RADIOLOGY & ADDITIONAL STUDIES:  < from: CT Abdomen and Pelvis w/ IV Cont (03.06.23 @ 00:18) >  ACC: [id number] EXAM:  CT ABDOMEN AND PELVIS IC   ORDERED BY: DEE GARCIA     PROCEDURE DATE:  03/06/2023          INTERPRETATION:  CLINICAL HISTORY / REASON FOR EXAM: Diarrhea. Abdominal   pain    TECHNIQUE: Contiguous axial CT images were obtained from the lower chest   to the pubic symphysis following the administration of 100 mL Omnipaque   350 intravenous contrast. Oral contrast was not administered. Reformatted   images in the coronal and sagittal planes were acquired.    COMPARISON CT: CT abdomen and pelvis from 12/27/2021.    FINDINGS:    LOWER CHEST: Bibasilar dependent atelectasis.    HEPATOBILIARY: Post cholecystectomy.    SPLEEN: Unremarkable.    PANCREAS: Unremarkable.    ADRENAL GLANDS: Unremarkable.    KIDNEYS: Thinned bilateral renal cortex, left greater than right.   Otherwise, there is symmetric pattern of renal enhancement. No evidence   of hydronephrosis. Left renal cysts.    ABDOMINOPELVIC NODES: Unremarkable.    PELVIC ORGANS: Again seen is irregular urinary bladder wall thickening,   progressed since the prior examination.    PERITONEUM/MESENTERY/BOWEL: Predominantly sigmoid diverticulosis with   mild circumferential wall thickening of the distal descending and entire   sigmoid colon. No bowel obstruction, ascites or intraperitoneal free air.   Normal caliber appendix.    BONES/SOFT TISSUES: Degenerative changes to the thoracolumbar spine.   Stable chronic inferior erosive changes of the T10 vertebral body.    VASCULAR: Calcified atherosclerotic disease within the abdominal aorta.      IMPRESSION:    Again seen is irregular urinary bladder wall thickening, progressed since   the prior examination which is 4 urinary bladder neoplasm. Direct   visualization is recommended.    Mild circumferential wall thickening of the distal descending and entire   sigmoid colon, compatible with colitis in the appropriate clinical   setting.    --- End of Report ---    ANTONIETA TENORIO MD; Resident Radiologist  This document has been electronically signed.  PERNELL GARCIA MD; Attending Radiologist  This document has been electronically signed. Mar  6 2023 12:48AM    < end of copied text >    < from: CT Abdomen and Pelvis No Cont (12.27.21 @ 12:37) >  IMPRESSION:    1. Evidence of multiple incidental urinary bladder nodules throughout   anterior and posterior walls on noncontrast CT. Findings may represent   urinary bladder neoplasm. Outpatient urology consultation recommended.    2. No evidence of acute intra-abdominal pathology.      --- End of Report ---    KYLE RIVAS MD; Attending Radiologist  This document has been electronically signed. Dec 27 2021 12:39PM    < end of copied text >         Pt is a 84yo Female who with PMH of  HTN, HLD, DM on insulin, asthma, ESRD on HD M/W/F and PSH of TAHBSO 2/2 Ovarian Ca , s/p cholecystectomy p/w weakness, vomiting and diarrhea -  c/s for suspected urinary bladder neoplasm on CT. Patient seen and examined at bedside. Patient reports she has been experiencing urinary frequency, urgency, incomplete bladder emptying. Patient admits to has seen urogynecologist for 'routine work-up' years ago. Patient admits she was voiding and noticed a small clot while wiping. She admits slight blood on her alonzo. Denies seen a urologist in the past. Denies any fever, chills, SOB/difficulty breathing , abdominal pain, flank pain, hematuria.     CT A/P with IVC 3/7/23  shows Again seen is irregular urinary bladder wall thickening, progressed since the prior examination which is 4 urinary bladder neoplasm. On previous CT in 12/27/21 note w/ Evidence of multiple incidental urinary bladder nodules throughout anterior and posterior walls on noncontrast CT. Findings may represent urinary bladder neoplasm.     PAST MEDICAL & SURGICAL HISTORY:  Asthma, unspecified asthma severity, unspecified whether complicated, unspecified whether persistent      Hypertension affecting pregnancy, antepartum      Type 2 diabetes mellitus without complication, with long-term current use of insulin      No significant past surgical history      MEDICATIONS  (STANDING):  albuterol    0.083%.. 2.5 milliGRAM(s) Nebulizer every 20 minutes  amLODIPine   Tablet 5 milliGRAM(s) Oral daily  aspirin  chewable 81 milliGRAM(s) Oral daily  atorvastatin 40 milliGRAM(s) Oral at bedtime  brimonidine 0.2% Ophthalmic Solution 1 Drop(s) Both EYES two times a day  budesonide 160 MICROgram(s)/formoterol 4.5 MICROgram(s) Inhaler 2 Puff(s) Inhalation two times a day  calcium acetate 1334 milliGRAM(s) Oral two times a day with meals  ciprofloxacin     Tablet 500 milliGRAM(s) Oral daily  dextrose 5%. 1000 milliLiter(s) (100 mL/Hr) IV Continuous <Continuous>  dextrose 5%. 1000 milliLiter(s) (50 mL/Hr) IV Continuous <Continuous>  dextrose 50% Injectable 25 Gram(s) IV Push once  dextrose 50% Injectable 12.5 Gram(s) IV Push once  dextrose 50% Injectable 25 Gram(s) IV Push once  epoetin shannon-epbx (RETACRIT) Injectable 09927 Unit(s) IV Push <User Schedule>  glucagon  Injectable 1 milliGRAM(s) IntraMuscular once  heparin   Injectable 5000 Unit(s) SubCutaneous every 8 hours  influenza  Vaccine (HIGH DOSE) 0.7 milliLiter(s) IntraMuscular once  insulin glargine Injectable (LANTUS) 24 Unit(s) SubCutaneous at bedtime  insulin lispro (ADMELOG) corrective regimen sliding scale   SubCutaneous three times a day before meals  insulin lispro Injectable (ADMELOG) 8 Unit(s) SubCutaneous three times a day before meals  latanoprost 0.005% Ophthalmic Solution 1 Drop(s) Both EYES at bedtime  metroNIDAZOLE    Tablet 500 milliGRAM(s) Oral three times a day  pantoprazole    Tablet 40 milliGRAM(s) Oral before breakfast    MEDICATIONS  (PRN):  acetaminophen     Tablet .. 650 milliGRAM(s) Oral every 6 hours PRN Temp greater or equal to 38C (100.4F), Mild Pain (1 - 3)  albuterol    90 MICROgram(s) HFA Inhaler 1 Puff(s) Inhalation every 4 hours PRN Bronchospasm  dextrose Oral Gel 15 Gram(s) Oral once PRN Blood Glucose LESS THAN 70 milliGRAM(s)/deciliter  melatonin 5 milliGRAM(s) Oral at bedtime PRN Insomnia  ondansetron Injectable 4 milliGRAM(s) IV Push every 8 hours PRN Nausea and/or Vomiting      Allergies    No Known Allergies    Intolerances          SOCIAL HISTORY:    FAMILY HISTORY:      REVIEW OF SYSTEMS   [ ] A ten-point review of systems was otherwise negative except as noted.  [ ] Due to altered mental status/intubation, subjective information were not able to be obtained from patient. History was obtained, to the extent possible, from review of the chart and collateral sources of information.    Vital Signs Last 24 Hrs  T(C): 36 (07 Mar 2023 05:00), Max: 36.7 (06 Mar 2023 15:00)  T(F): 96.8 (07 Mar 2023 05:00), Max: 98.1 (06 Mar 2023 15:00)  HR: 84 (07 Mar 2023 02:00) (69 - 84)  BP: 143/64 (07 Mar 2023 05:00) (119/63 - 151/70)  RR: 18 (07 Mar 2023 05:00) (16 - 18)  SpO2: 99% (07 Mar 2023 01:11) (99% - 100%)    Parameters below as of 07 Mar 2023 02:00  Patient On (Oxygen Delivery Method): room air    LABS:                        8.5    7.47  )-----------( 232      ( 06 Mar 2023 09:09 )             25.5     03-06    127<L>  |  91<L>  |  96<HH>  ----------------------------<  178<H>  6.1<HH>   |  21  |  8.1<HH>    Ca    7.7<L>      06 Mar 2023 09:09  Mg     2.0     03-06    TPro  5.8<L>  /  Alb  3.1<L>  /  TBili  <0.2  /  DBili  x   /  AST  59<H>  /  ALT  48<H>  /  AlkPhos  121<H>  03-06    PT/INR - ( 05 Mar 2023 22:13 )   PT: 10.50 sec;   INR: 0.92 ratio         PTT - ( 05 Mar 2023 22:13 )  PTT:31.4 sec    Urinalysis (03.05.23 @ 02:28)   Blood, Urine: Small   pH Urine: 8.0   Glucose Qualitative, Urine: Negative   Color: Yellow   Urine Appearance: Slightly Turbid   Bilirubin: Negative   Ketone - Urine: Negative   Specific Gravity: 1.013   Protein, Urine: 300 mg/dL   Urobilinogen: <2 mg/dL   Nitrite: Negative   Leukocyte Esterase Concentration: Large     Culture - Urine (03.05.23 @ 02:28)    Specimen Source: Clean Catch Clean Catch (Midstream)    Culture Results:   <10,000 CFU/mL Normal Urogenital Fidelina          RADIOLOGY & ADDITIONAL STUDIES:  < from: CT Abdomen and Pelvis w/ IV Cont (03.06.23 @ 00:18) >  ACC: [id number] EXAM:  CT ABDOMEN AND PELVIS IC   ORDERED BY: DEE GARCIA     PROCEDURE DATE:  03/06/2023          INTERPRETATION:  CLINICAL HISTORY / REASON FOR EXAM: Diarrhea. Abdominal   pain    TECHNIQUE: Contiguous axial CT images were obtained from the lower chest   to the pubic symphysis following the administration of 100 mL Omnipaque   350 intravenous contrast. Oral contrast was not administered. Reformatted   images in the coronal and sagittal planes were acquired.    COMPARISON CT: CT abdomen and pelvis from 12/27/2021.    FINDINGS:    LOWER CHEST: Bibasilar dependent atelectasis.    HEPATOBILIARY: Post cholecystectomy.    SPLEEN: Unremarkable.    PANCREAS: Unremarkable.    ADRENAL GLANDS: Unremarkable.    KIDNEYS: Thinned bilateral renal cortex, left greater than right.   Otherwise, there is symmetric pattern of renal enhancement. No evidence   of hydronephrosis. Left renal cysts.    ABDOMINOPELVIC NODES: Unremarkable.    PELVIC ORGANS: Again seen is irregular urinary bladder wall thickening,   progressed since the prior examination.    PERITONEUM/MESENTERY/BOWEL: Predominantly sigmoid diverticulosis with   mild circumferential wall thickening of the distal descending and entire   sigmoid colon. No bowel obstruction, ascites or intraperitoneal free air.   Normal caliber appendix.    BONES/SOFT TISSUES: Degenerative changes to the thoracolumbar spine.   Stable chronic inferior erosive changes of the T10 vertebral body.    VASCULAR: Calcified atherosclerotic disease within the abdominal aorta.      IMPRESSION:    Again seen is irregular urinary bladder wall thickening, progressed since   the prior examination which is 4 urinary bladder neoplasm. Direct   visualization is recommended.    Mild circumferential wall thickening of the distal descending and entire   sigmoid colon, compatible with colitis in the appropriate clinical   setting.    --- End of Report ---    ANTONIETA TENORIO MD; Resident Radiologist  This document has been electronically signed.  PERNELL GARCIA MD; Attending Radiologist  This document has been electronically signed. Mar  6 2023 12:48AM    < end of copied text >    < from: CT Abdomen and Pelvis No Cont (12.27.21 @ 12:37) >  IMPRESSION:    1. Evidence of multiple incidental urinary bladder nodules throughout   anterior and posterior walls on noncontrast CT. Findings may represent   urinary bladder neoplasm. Outpatient urology consultation recommended.    2. No evidence of acute intra-abdominal pathology.      --- End of Report ---    KYLE RIVAS MD; Attending Radiologist  This document has been electronically signed. Dec 27 2021 12:39PM    < end of copied text >         Pt is a 82yo Female who with PMH of  HTN, HLD, DM on insulin, asthma, ESRD on HD M/W/F and PSH of TAHBSO 2/2 Ovarian Ca , s/p cholecystectomy p/w weakness, vomiting and diarrhea -  c/s for suspected urinary bladder neoplasm on CT. Patient seen and examined at bedside. Patient reports she has been experiencing urinary frequency, urgency, incomplete bladder emptying. Patient admits to has seen urogynecologist for 'routine work-up' years ago. Patient admits she was voiding and noticed a small clot while wiping. She admits slight blood on her laonzo. Denies seen a urologist in the past. Denies any fever, chills, SOB/difficulty breathing , abdominal pain, flank pain, hematuria.     CT A/P with IVC 3/7/23  shows Again seen is irregular urinary bladder wall thickening, progressed since the prior examination which is 4 urinary bladder neoplasm. On previous CT in 12/27/21 note w/ Evidence of multiple incidental urinary bladder nodules throughout anterior and posterior walls on noncontrast CT. Findings may represent urinary bladder neoplasm.     PAST MEDICAL & SURGICAL HISTORY:  Asthma, unspecified asthma severity, unspecified whether complicated, unspecified whether persistent      Hypertension affecting pregnancy, antepartum      Type 2 diabetes mellitus without complication, with long-term current use of insulin      No significant past surgical history      MEDICATIONS  (STANDING):  albuterol    0.083%.. 2.5 milliGRAM(s) Nebulizer every 20 minutes  amLODIPine   Tablet 5 milliGRAM(s) Oral daily  aspirin  chewable 81 milliGRAM(s) Oral daily  atorvastatin 40 milliGRAM(s) Oral at bedtime  brimonidine 0.2% Ophthalmic Solution 1 Drop(s) Both EYES two times a day  budesonide 160 MICROgram(s)/formoterol 4.5 MICROgram(s) Inhaler 2 Puff(s) Inhalation two times a day  calcium acetate 1334 milliGRAM(s) Oral two times a day with meals  ciprofloxacin     Tablet 500 milliGRAM(s) Oral daily  dextrose 5%. 1000 milliLiter(s) (100 mL/Hr) IV Continuous <Continuous>  dextrose 5%. 1000 milliLiter(s) (50 mL/Hr) IV Continuous <Continuous>  dextrose 50% Injectable 25 Gram(s) IV Push once  dextrose 50% Injectable 12.5 Gram(s) IV Push once  dextrose 50% Injectable 25 Gram(s) IV Push once  epoetin shannon-epbx (RETACRIT) Injectable 44709 Unit(s) IV Push <User Schedule>  glucagon  Injectable 1 milliGRAM(s) IntraMuscular once  heparin   Injectable 5000 Unit(s) SubCutaneous every 8 hours  influenza  Vaccine (HIGH DOSE) 0.7 milliLiter(s) IntraMuscular once  insulin glargine Injectable (LANTUS) 24 Unit(s) SubCutaneous at bedtime  insulin lispro (ADMELOG) corrective regimen sliding scale   SubCutaneous three times a day before meals  insulin lispro Injectable (ADMELOG) 8 Unit(s) SubCutaneous three times a day before meals  latanoprost 0.005% Ophthalmic Solution 1 Drop(s) Both EYES at bedtime  metroNIDAZOLE    Tablet 500 milliGRAM(s) Oral three times a day  pantoprazole    Tablet 40 milliGRAM(s) Oral before breakfast    MEDICATIONS  (PRN):  acetaminophen     Tablet .. 650 milliGRAM(s) Oral every 6 hours PRN Temp greater or equal to 38C (100.4F), Mild Pain (1 - 3)  albuterol    90 MICROgram(s) HFA Inhaler 1 Puff(s) Inhalation every 4 hours PRN Bronchospasm  dextrose Oral Gel 15 Gram(s) Oral once PRN Blood Glucose LESS THAN 70 milliGRAM(s)/deciliter  melatonin 5 milliGRAM(s) Oral at bedtime PRN Insomnia  ondansetron Injectable 4 milliGRAM(s) IV Push every 8 hours PRN Nausea and/or Vomiting      Allergies    No Known Allergies    Intolerances          SOCIAL HISTORY:    FAMILY HISTORY:      REVIEW OF SYSTEMS   [ ] A ten-point review of systems was otherwise negative except as noted.    Vital Signs Last 24 Hrs  T(C): 36 (07 Mar 2023 05:00), Max: 36.7 (06 Mar 2023 15:00)  T(F): 96.8 (07 Mar 2023 05:00), Max: 98.1 (06 Mar 2023 15:00)  HR: 84 (07 Mar 2023 02:00) (69 - 84)  BP: 143/64 (07 Mar 2023 05:00) (119/63 - 151/70)  RR: 18 (07 Mar 2023 05:00) (16 - 18)  SpO2: 99% (07 Mar 2023 01:11) (99% - 100%)    Parameters below as of 07 Mar 2023 02:00  Patient On (Oxygen Delivery Method): room air    LABS:                        8.5    7.47  )-----------( 232      ( 06 Mar 2023 09:09 )             25.5     03-06    127<L>  |  91<L>  |  96<HH>  ----------------------------<  178<H>  6.1<HH>   |  21  |  8.1<HH>    Ca    7.7<L>      06 Mar 2023 09:09  Mg     2.0     03-06    TPro  5.8<L>  /  Alb  3.1<L>  /  TBili  <0.2  /  DBili  x   /  AST  59<H>  /  ALT  48<H>  /  AlkPhos  121<H>  03-06    PT/INR - ( 05 Mar 2023 22:13 )   PT: 10.50 sec;   INR: 0.92 ratio         PTT - ( 05 Mar 2023 22:13 )  PTT:31.4 sec    Urinalysis (03.05.23 @ 02:28)   Blood, Urine: Small   pH Urine: 8.0   Glucose Qualitative, Urine: Negative   Color: Yellow   Urine Appearance: Slightly Turbid   Bilirubin: Negative   Ketone - Urine: Negative   Specific Gravity: 1.013   Protein, Urine: 300 mg/dL   Urobilinogen: <2 mg/dL   Nitrite: Negative   Leukocyte Esterase Concentration: Large     Culture - Urine (03.05.23 @ 02:28)    Specimen Source: Clean Catch Clean Catch (Midstream)    Culture Results:   <10,000 CFU/mL Normal Urogenital Fidelina          RADIOLOGY & ADDITIONAL STUDIES:  < from: CT Abdomen and Pelvis w/ IV Cont (03.06.23 @ 00:18) >  ACC: [id number] EXAM:  CT ABDOMEN AND PELVIS IC   ORDERED BY: DEE GARCIA     PROCEDURE DATE:  03/06/2023          INTERPRETATION:  CLINICAL HISTORY / REASON FOR EXAM: Diarrhea. Abdominal   pain    TECHNIQUE: Contiguous axial CT images were obtained from the lower chest   to the pubic symphysis following the administration of 100 mL Omnipaque   350 intravenous contrast. Oral contrast was not administered. Reformatted   images in the coronal and sagittal planes were acquired.    COMPARISON CT: CT abdomen and pelvis from 12/27/2021.    FINDINGS:    LOWER CHEST: Bibasilar dependent atelectasis.    HEPATOBILIARY: Post cholecystectomy.    SPLEEN: Unremarkable.    PANCREAS: Unremarkable.    ADRENAL GLANDS: Unremarkable.    KIDNEYS: Thinned bilateral renal cortex, left greater than right.   Otherwise, there is symmetric pattern of renal enhancement. No evidence   of hydronephrosis. Left renal cysts.    ABDOMINOPELVIC NODES: Unremarkable.    PELVIC ORGANS: Again seen is irregular urinary bladder wall thickening,   progressed since the prior examination.    PERITONEUM/MESENTERY/BOWEL: Predominantly sigmoid diverticulosis with   mild circumferential wall thickening of the distal descending and entire   sigmoid colon. No bowel obstruction, ascites or intraperitoneal free air.   Normal caliber appendix.    BONES/SOFT TISSUES: Degenerative changes to the thoracolumbar spine.   Stable chronic inferior erosive changes of the T10 vertebral body.    VASCULAR: Calcified atherosclerotic disease within the abdominal aorta.      IMPRESSION:    Again seen is irregular urinary bladder wall thickening, progressed since   the prior examination which is 4 urinary bladder neoplasm. Direct   visualization is recommended.    Mild circumferential wall thickening of the distal descending and entire   sigmoid colon, compatible with colitis in the appropriate clinical   setting.    --- End of Report ---    ANTONIETA TENORIO MD; Resident Radiologist  This document has been electronically signed.  PERNELL GARCIA MD; Attending Radiologist  This document has been electronically signed. Mar  6 2023 12:48AM    < end of copied text >    < from: CT Abdomen and Pelvis No Cont (12.27.21 @ 12:37) >  IMPRESSION:    1. Evidence of multiple incidental urinary bladder nodules throughout   anterior and posterior walls on noncontrast CT. Findings may represent   urinary bladder neoplasm. Outpatient urology consultation recommended.    2. No evidence of acute intra-abdominal pathology.      --- End of Report ---    KYLE RIVAS MD; Attending Radiologist  This document has been electronically signed. Dec 27 2021 12:39PM    < end of copied text >

## 2023-03-08 LAB
ALBUMIN SERPL ELPH-MCNC: 3.2 G/DL — LOW (ref 3.5–5.2)
ALP SERPL-CCNC: 107 U/L — SIGNIFICANT CHANGE UP (ref 30–115)
ALT FLD-CCNC: 26 U/L — SIGNIFICANT CHANGE UP (ref 0–41)
ANION GAP SERPL CALC-SCNC: 11 MMOL/L — SIGNIFICANT CHANGE UP (ref 7–14)
AST SERPL-CCNC: 25 U/L — SIGNIFICANT CHANGE UP (ref 0–41)
BILIRUB SERPL-MCNC: <0.2 MG/DL — SIGNIFICANT CHANGE UP (ref 0.2–1.2)
BUN SERPL-MCNC: 67 MG/DL — CRITICAL HIGH (ref 10–20)
CALCIUM SERPL-MCNC: 8.4 MG/DL — SIGNIFICANT CHANGE UP (ref 8.4–10.5)
CHLORIDE SERPL-SCNC: 98 MMOL/L — SIGNIFICANT CHANGE UP (ref 98–110)
CO2 SERPL-SCNC: 21 MMOL/L — SIGNIFICANT CHANGE UP (ref 17–32)
CREAT SERPL-MCNC: 7.3 MG/DL — CRITICAL HIGH (ref 0.7–1.5)
EGFR: 5 ML/MIN/1.73M2 — LOW
GLUCOSE BLDC GLUCOMTR-MCNC: 110 MG/DL — HIGH (ref 70–99)
GLUCOSE BLDC GLUCOMTR-MCNC: 116 MG/DL — HIGH (ref 70–99)
GLUCOSE BLDC GLUCOMTR-MCNC: 130 MG/DL — HIGH (ref 70–99)
GLUCOSE BLDC GLUCOMTR-MCNC: 96 MG/DL — SIGNIFICANT CHANGE UP (ref 70–99)
GLUCOSE SERPL-MCNC: 183 MG/DL — HIGH (ref 70–99)
HCT VFR BLD CALC: 27.4 % — LOW (ref 37–47)
HCT VFR BLD CALC: 28.7 % — LOW (ref 37–47)
HGB BLD-MCNC: 8.7 G/DL — LOW (ref 12–16)
HGB BLD-MCNC: 9 G/DL — LOW (ref 12–16)
MAGNESIUM SERPL-MCNC: 1.9 MG/DL — SIGNIFICANT CHANGE UP (ref 1.8–2.4)
MCHC RBC-ENTMCNC: 30.1 PG — SIGNIFICANT CHANGE UP (ref 27–31)
MCHC RBC-ENTMCNC: 30.1 PG — SIGNIFICANT CHANGE UP (ref 27–31)
MCHC RBC-ENTMCNC: 31.4 G/DL — LOW (ref 32–37)
MCHC RBC-ENTMCNC: 31.8 G/DL — LOW (ref 32–37)
MCV RBC AUTO: 94.8 FL — SIGNIFICANT CHANGE UP (ref 81–99)
MCV RBC AUTO: 96 FL — SIGNIFICANT CHANGE UP (ref 81–99)
NRBC # BLD: 0 /100 WBCS — SIGNIFICANT CHANGE UP (ref 0–0)
NRBC # BLD: 0 /100 WBCS — SIGNIFICANT CHANGE UP (ref 0–0)
PLATELET # BLD AUTO: 259 K/UL — SIGNIFICANT CHANGE UP (ref 130–400)
PLATELET # BLD AUTO: 268 K/UL — SIGNIFICANT CHANGE UP (ref 130–400)
POTASSIUM SERPL-MCNC: 6.2 MMOL/L — CRITICAL HIGH (ref 3.5–5)
POTASSIUM SERPL-SCNC: 6.2 MMOL/L — CRITICAL HIGH (ref 3.5–5)
PROT SERPL-MCNC: 6 G/DL — SIGNIFICANT CHANGE UP (ref 6–8)
RBC # BLD: 2.89 M/UL — LOW (ref 4.2–5.4)
RBC # BLD: 2.99 M/UL — LOW (ref 4.2–5.4)
RBC # FLD: 15.9 % — HIGH (ref 11.5–14.5)
RBC # FLD: 16 % — HIGH (ref 11.5–14.5)
SODIUM SERPL-SCNC: 130 MMOL/L — LOW (ref 135–146)
TROPONIN T SERPL-MCNC: 0.16 NG/ML — CRITICAL HIGH
WBC # BLD: 6.23 K/UL — SIGNIFICANT CHANGE UP (ref 4.8–10.8)
WBC # BLD: 7.36 K/UL — SIGNIFICANT CHANGE UP (ref 4.8–10.8)
WBC # FLD AUTO: 6.23 K/UL — SIGNIFICANT CHANGE UP (ref 4.8–10.8)
WBC # FLD AUTO: 7.36 K/UL — SIGNIFICANT CHANGE UP (ref 4.8–10.8)

## 2023-03-08 PROCEDURE — 99233 SBSQ HOSP IP/OBS HIGH 50: CPT

## 2023-03-08 RX ADMIN — HEPARIN SODIUM 5000 UNIT(S): 5000 INJECTION INTRAVENOUS; SUBCUTANEOUS at 06:47

## 2023-03-08 RX ADMIN — PANTOPRAZOLE SODIUM 40 MILLIGRAM(S): 20 TABLET, DELAYED RELEASE ORAL at 06:48

## 2023-03-08 RX ADMIN — BRIMONIDINE TARTRATE 1 DROP(S): 2 SOLUTION/ DROPS OPHTHALMIC at 06:47

## 2023-03-08 RX ADMIN — INSULIN GLARGINE 24 UNIT(S): 100 INJECTION, SOLUTION SUBCUTANEOUS at 08:12

## 2023-03-08 RX ADMIN — Medication 500 MILLIGRAM(S): at 12:38

## 2023-03-08 RX ADMIN — Medication 500 MILLIGRAM(S): at 06:48

## 2023-03-08 RX ADMIN — Medication 1334 MILLIGRAM(S): at 17:37

## 2023-03-08 RX ADMIN — Medication 8 UNIT(S): at 08:11

## 2023-03-08 RX ADMIN — Medication 81 MILLIGRAM(S): at 12:37

## 2023-03-08 RX ADMIN — LATANOPROST 1 DROP(S): 0.05 SOLUTION/ DROPS OPHTHALMIC; TOPICAL at 22:37

## 2023-03-08 RX ADMIN — BRIMONIDINE TARTRATE 1 DROP(S): 2 SOLUTION/ DROPS OPHTHALMIC at 17:38

## 2023-03-08 RX ADMIN — Medication 1334 MILLIGRAM(S): at 08:13

## 2023-03-08 RX ADMIN — Medication 500 MILLIGRAM(S): at 14:32

## 2023-03-08 RX ADMIN — AMLODIPINE BESYLATE 5 MILLIGRAM(S): 2.5 TABLET ORAL at 06:48

## 2023-03-08 RX ADMIN — HEPARIN SODIUM 5000 UNIT(S): 5000 INJECTION INTRAVENOUS; SUBCUTANEOUS at 14:32

## 2023-03-08 RX ADMIN — BUDESONIDE AND FORMOTEROL FUMARATE DIHYDRATE 2 PUFF(S): 160; 4.5 AEROSOL RESPIRATORY (INHALATION) at 12:38

## 2023-03-08 NOTE — PROGRESS NOTE ADULT - ASSESSMENT
ESRD on HD MWF, missed Friday  Sigmoid colitis  Irregular bladder wall thickening  Hyperkalemia  Anemia of CKD  Hyponatremia  Hyperphosphatemia  Hypocalcemia  DM    Plan:    HD today: 3 hours, opti 160 dialyzer, 1K bath, 2 L UF  Renally dose antibiotics  Renal diet  Calcium acetate with meals  CT urogram  Urology f/u

## 2023-03-08 NOTE — PROGRESS NOTE ADULT - SUBJECTIVE AND OBJECTIVE BOX
CHIEF COMPLAINT:    Patient is a 83y old  Female who presents with a chief complaint of Hyperkalemia, Colitis     INTERVAL HPI/OVERNIGHT EVENTS:    Patient seen and examined at bedside. No acute overnight events occurred.    ROS: Denies SOB, chest pain. All other systems are negative.    Medications:  Standing  albuterol    0.083%.. 2.5 milliGRAM(s) Nebulizer every 20 minutes  amLODIPine   Tablet 5 milliGRAM(s) Oral daily  aspirin  chewable 81 milliGRAM(s) Oral daily  atorvastatin 40 milliGRAM(s) Oral at bedtime  brimonidine 0.2% Ophthalmic Solution 1 Drop(s) Both EYES two times a day  budesonide 160 MICROgram(s)/formoterol 4.5 MICROgram(s) Inhaler 2 Puff(s) Inhalation two times a day  calcium acetate 1334 milliGRAM(s) Oral two times a day with meals  ciprofloxacin     Tablet 500 milliGRAM(s) Oral daily  dextrose 5%. 1000 milliLiter(s) IV Continuous <Continuous>  dextrose 5%. 1000 milliLiter(s) IV Continuous <Continuous>  dextrose 50% Injectable 25 Gram(s) IV Push once  dextrose 50% Injectable 12.5 Gram(s) IV Push once  dextrose 50% Injectable 25 Gram(s) IV Push once  epoetin shannon-epbx (RETACRIT) Injectable 28587 Unit(s) IV Push <User Schedule>  glucagon  Injectable 1 milliGRAM(s) IntraMuscular once  heparin   Injectable 5000 Unit(s) SubCutaneous every 8 hours  influenza  Vaccine (HIGH DOSE) 0.7 milliLiter(s) IntraMuscular once  insulin glargine Injectable (LANTUS) 24 Unit(s) SubCutaneous at bedtime  insulin lispro (ADMELOG) corrective regimen sliding scale   SubCutaneous three times a day before meals  insulin lispro Injectable (ADMELOG) 8 Unit(s) SubCutaneous three times a day before meals  latanoprost 0.005% Ophthalmic Solution 1 Drop(s) Both EYES at bedtime  metroNIDAZOLE    Tablet 500 milliGRAM(s) Oral three times a day  pantoprazole    Tablet 40 milliGRAM(s) Oral before breakfast    PRN Meds  acetaminophen     Tablet .. 650 milliGRAM(s) Oral every 6 hours PRN  albuterol    90 MICROgram(s) HFA Inhaler 1 Puff(s) Inhalation every 4 hours PRN  dextrose Oral Gel 15 Gram(s) Oral once PRN  melatonin 5 milliGRAM(s) Oral at bedtime PRN  ondansetron Injectable 4 milliGRAM(s) IV Push every 8 hours PRN        Vital Signs:    T(F): 97 (23 @ 14:18), Max: 97 (23 @ 14:18)  HR: 87 (23 @ 14:18) (67 - 87)  BP: 116/54 (23 @ 14:18) (116/54 - 166/72)  RR: 18 (23 @ 14:18) (18 - 18)  SpO2: 98% (23 @ 21:15) (98% - 98%)  I&O's Summary    07 Mar 2023 07:01  -  08 Mar 2023 07:00  --------------------------------------------------------  IN: 0 mL / OUT: 900 mL / NET: -900 mL    08 Mar 2023 07:01  -  08 Mar 2023 15:48  --------------------------------------------------------  IN: 286 mL / OUT: 2400 mL / NET: -2114 mL      Daily Height in cm: 162.56 (07 Mar 2023 23:40)    Daily Weight in k (08 Mar 2023 05:11)  CAPILLARY BLOOD GLUCOSE      POCT Blood Glucose.: 96 mg/dL (08 Mar 2023 12:36)  POCT Blood Glucose.: 130 mg/dL (08 Mar 2023 07:58)  POCT Blood Glucose.: 104 mg/dL (07 Mar 2023 21:29)  POCT Blood Glucose.: 125 mg/dL (07 Mar 2023 18:01)  POCT Blood Glucose.: 119 mg/dL (07 Mar 2023 16:40)      PHYSICAL EXAM:  GENERAL:  NAD  SKIN: No rashes or lesions  HEENT: Atraumatic. Normocephalic. Anicteric  NECK:  No JVD.   PULMONARY: Clear to ausculation bilaterally. No wheezing. No rales  CVS: Normal S1, S2. Regular rate and rhythm. No murmurs.  ABDOMEN/GI: Soft, Nontender, Nondistended; Bowel sounds are present  EXTREMITIES:  No edema B/L LE.  NEUROLOGIC:  No motor deficit.  PSYCH: Alert & oriented x 3, normal affect      LABS:                        8.7    6.23  )-----------( 259      ( 08 Mar 2023 09:30 )             27.4     03-08    130<L>  |  98  |  67<HH>  ----------------------------<  183<H>  6.2<HH>   |  21  |  7.3<HH>    Ca    8.4      08 Mar 2023 09:30  Mg     1.9     03-08    TPro  6.0  /  Alb  3.2<L>  /  TBili  <0.2  /  DBili  x   /  AST  25  /  ALT  26  /  AlkPhos  107  03-08      Serum Pro-Brain Natriuretic Peptide: 1813 pg/mL (23 @ 22:36)    Trop 0.16, CKMB --, CK --, 23 @ 09:30  Trop 0.15, CKMB --, CK --, 23 @ 17:29  Trop 0.16, CKMB --, CK --, 23 @ 12:20  Trop 0.04, CKMB --, CK --, 23 @ 09:09  Trop 0.02, CKMB --, CK --, 23 @ 22:36        RADIOLOGY & ADDITIONAL TESTS:  Imaging or report Personally Reviewed:  [ ] YES  [ ] NO -->no new images    Telemetry reviewed independently - NSR, no acute events  EKG reviewed independently -->no new EKGs    Consultant(s) Notes Reviewed:  [ ] YES  [ ] NO  Care Discussed with Consultants/Other Providers [ ] YES  [ ] NO    Case discussed with resident  Care discussed with pt

## 2023-03-08 NOTE — PROGRESS NOTE ADULT - SUBJECTIVE AND OBJECTIVE BOX
[place] NEPHROLOGY FOLLOW UP NOTE  --------------------------------------------------------------------------------  24 hour events/subjective: Patient examined. Appears comfortable.    PAST HISTORY  --------------------------------------------------------------------------------  No significant changes to PMH, PSH, FHx, SHx, unless otherwise noted    ALLERGIES & MEDICATIONS  --------------------------------------------------------------------------------  Allergies    No Known Allergies    Standing Inpatient Medications  albuterol    0.083%.. 2.5 milliGRAM(s) Nebulizer every 20 minutes  amLODIPine   Tablet 5 milliGRAM(s) Oral daily  aspirin  chewable 81 milliGRAM(s) Oral daily  atorvastatin 40 milliGRAM(s) Oral at bedtime  brimonidine 0.2% Ophthalmic Solution 1 Drop(s) Both EYES two times a day  budesonide 160 MICROgram(s)/formoterol 4.5 MICROgram(s) Inhaler 2 Puff(s) Inhalation two times a day  calcium acetate 1334 milliGRAM(s) Oral two times a day with meals  ciprofloxacin     Tablet 500 milliGRAM(s) Oral daily  dextrose 5%. 1000 milliLiter(s) IV Continuous <Continuous>  dextrose 5%. 1000 milliLiter(s) IV Continuous <Continuous>  dextrose 50% Injectable 25 Gram(s) IV Push once  dextrose 50% Injectable 12.5 Gram(s) IV Push once  dextrose 50% Injectable 25 Gram(s) IV Push once  epoetin shannon-epbx (RETACRIT) Injectable 47916 Unit(s) IV Push <User Schedule>  glucagon  Injectable 1 milliGRAM(s) IntraMuscular once  heparin   Injectable 5000 Unit(s) SubCutaneous every 8 hours  influenza  Vaccine (HIGH DOSE) 0.7 milliLiter(s) IntraMuscular once  insulin glargine Injectable (LANTUS) 24 Unit(s) SubCutaneous at bedtime  insulin lispro (ADMELOG) corrective regimen sliding scale   SubCutaneous three times a day before meals  insulin lispro Injectable (ADMELOG) 8 Unit(s) SubCutaneous three times a day before meals  latanoprost 0.005% Ophthalmic Solution 1 Drop(s) Both EYES at bedtime  metroNIDAZOLE    Tablet 500 milliGRAM(s) Oral three times a day  pantoprazole    Tablet 40 milliGRAM(s) Oral before breakfast    PRN Inpatient Medications  acetaminophen     Tablet .. 650 milliGRAM(s) Oral every 6 hours PRN  albuterol    90 MICROgram(s) HFA Inhaler 1 Puff(s) Inhalation every 4 hours PRN  dextrose Oral Gel 15 Gram(s) Oral once PRN  melatonin 5 milliGRAM(s) Oral at bedtime PRN  ondansetron Injectable 4 milliGRAM(s) IV Push every 8 hours PRN    VITALS/PHYSICAL EXAM  --------------------------------------------------------------------------------  T(C): 36.1 (03-08-23 @ 05:11), Max: 36.1 (03-07-23 @ 13:15)  HR: 80 (03-08-23 @ 11:30) (67 - 81)  BP: 138/66 (03-08-23 @ 11:30) (134/64 - 166/72)  RR: 18 (03-08-23 @ 11:30) (18 - 18)  SpO2: 98% (03-07-23 @ 21:15) (98% - 98%)  Height (cm): 162.6 (03-07-23 @ 23:40)  Weight (kg): 94.2 (03-07-23 @ 23:40)  BMI (kg/m2): 35.6 (03-07-23 @ 23:40)  BSA (m2): 1.99 (03-07-23 @ 23:40)    03-07-23 @ 07:01  -  03-08-23 @ 07:00  --------------------------------------------------------  IN: 0 mL / OUT: 900 mL / NET: -900 mL    03-08-23 @ 07:01  -  03-08-23 @ 12:00  --------------------------------------------------------  IN: 0 mL / OUT: 2000 mL / NET: -2000 mL    Physical Exam:  	Gen: NAD  	Pulm: CTA B/L  	CV: RRR, S1S2  	Abd: +BS, soft, nontender/nondistended  	: No suprapubic tenderness  	LE: Warm, no edema  	Vascular access: AVF    LABS/STUDIES  --------------------------------------------------------------------------------              8.7    6.23  >-----------<  259      [03-08-23 @ 09:30]              27.4     130  |  98  |  67  ----------------------------<  183      [03-08-23 @ 09:30]  6.2   |  21  |  7.3        Ca     8.4     [03-08-23 @ 09:30]      Mg     1.9     [03-08-23 @ 09:30]    TPro  6.0  /  Alb  3.2  /  TBili  <0.2  /  DBili  x   /  AST  25  /  ALT  26  /  AlkPhos  107  [03-08-23 @ 09:30]    Troponin 0.16      [03-08-23 @ 09:30]    Creatinine Trend:  SCr 7.3 [03-08 @ 09:30]  SCr 6.4 [03-07 @ 17:31]  SCr 8.1 [03-06 @ 09:09]  SCr 8.5 [03-05 @ 22:13]    Urinalysis - [03-07-23 @ 10:37]      Color Colorless / Appearance Slightly Turbid / SG 1.008 / pH 8.5      Gluc Negative / Ketone Negative  / Bili Negative / Urobili <2 mg/dL       Blood Moderate / Protein 100 mg/dL / Leuk Est Large / Nitrite Negative       /  / Hyaline 2 / Gran  / Sq Epi  / Non Sq Epi 2 / Bacteria Negative    HbA1c 8.3      [04-13-18 @ 06:02]  Lipid: chol 143, TG 73, HDL 42, LDL --      [03-06-23 @ 09:09]

## 2023-03-08 NOTE — PROGRESS NOTE ADULT - ASSESSMENT
Pt is a 83y Female admitted with colitis & hyperkalemia --  following for bladder neoplasm.    PLAN:  -Catheter irrigated with sterile water -- cleared to a clear light bloody, no clots aspirated  -If alcala was not placed for retention, would consider TOV as alcala is going to irritate bladder/bladder tumor and could worsen hematuria  -Send urine cytology  -Trend Hgb, transfuse as needed  -Follow-up with uro onc, Dr. Larson for outpatient cystoscopy and biopsy  -Remainder of care as per primary team  -Will discuss with attending

## 2023-03-08 NOTE — PROGRESS NOTE ADULT - ASSESSMENT
83F with PMHx of HTN, DM on insulin, asthma, ESRD on HD M/W/F and PSH of JUDAH and cholecystectomy presented to the ED for weakness, vomiting, and diarrhea x1 day.    #Hyperkalemia  #Nausea/Vomiting  #ESRD on HD M/W/F  #Missed HD session  #Hyponatremia  nephrology on board  - s/p HD yesterday  - Monitor electrolytes  - Cont HD per nephrology    #vaginal bleed vs hematuria  - UA   -gyn consult  -TV US     #Sigmoid colitis  CTAP wIV 03/06: Mild circumferential wall thickening of the distal descending and entire sigmoid colon, compatible with colitis  - Continue ciprofloxacin and flagyl IV  - C.diff and GI PCR to be sent if pt has diarrhea    #Urinary bladder wall thickening  CTAP w IV 03/06: irregular bladder wall thickening, progressed since prior examination which is which may be neoplastic  - Urology eval requested    #IDDM  - A1c 11.7% in 1/23, f/u repeat  - Cont insulin regimen    #Asthma  - c/w symbicort, albuterol PRN  - Wean O2 to target SpO2 > 94%    #HTN  - on home amlodipine 5 and valsartan 160  - Resume amlodipine 5mg qD  - Hold valsartan for now    DVT PPX, heparin    #Progress Note Handoff  Pending (specify): Cont IV abx for colitis, urology f/u  Family discussion: dw pt regarding tx for colitis  Disposition: Home . 83F with PMHx of HTN, DM on insulin, asthma, ESRD on HD M/W/F and PSH of JUDAH and cholecystectomy presented to the ED for weakness, vomiting, and diarrhea x1 day.    #Hematuria  - Bladder US: findings suspicious for bladder malignancy  - CT A/P: diffuse patchy areas of abnormal enhancement throughout the urinary bladder suspicious for urinary bladder   neoplasm. Adjacent lymph node is noted. Direct visualization is recommended.  -     #ESRD on HD M/W/F  - Monitor electrolytes  - Cont HD per nephrology        #Sigmoid colitis  CTAP wIV 03/06: Mild circumferential wall thickening of the distal descending and entire sigmoid colon, compatible with colitis  - Continue ciprofloxacin and flagyl IV  - C.diff and GI PCR to be sent if pt has diarrhea    #Urinary bladder wall thickening  CTAP w IV 03/06: irregular bladder wall thickening, progressed since prior examination which is which may be neoplastic  - Urology eval requested    #IDDM  - A1c 11.7% in 1/23, f/u repeat  - Cont insulin regimen    #Asthma  - c/w symbicort, albuterol PRN  - Wean O2 to target SpO2 > 94%    #HTN  - on home amlodipine 5 and valsartan 160  - Resume amlodipine 5mg qD  - Hold valsartan for now    DVT PPX, heparin    #Progress Note Handoff  Pending (specify): Cont IV abx for colitis, urology f/u  Family discussion: dw pt regarding tx for colitis  Disposition: Home . 83F with PMHx of HTN, DM on insulin, asthma, ESRD on HD M/W/F and PSH of JUDAH and cholecystectomy presented to the ED for weakness, vomiting, and diarrhea x1 day.    #Hematuria  #Bladder mass suspicious of malignancy  - Bladder US: findings suspicious for bladder malignancy  - CT A/P: diffuse patchy areas of abnormal enhancement throughout the urinary bladder suspicious for urinary bladder   neoplasm. Adjacent lymph node is noted. Direct visualization is recommended.  - Maintain active type and screen  - Transfuse if Hgb < 8  - Urine culture 3/5: normal urogenital dino  - Urology following    #ESRD on HD M/W/F  - Monitor electrolytes  - Cont HD per nephrology    #Troponemia  - Likely due to demand ischemia in setting of acute blood loss, diarrhea and vomiting  - Neg on prior admission 1/25/34  - 0.02>0.04>0.16>0.15>0.16  - Denies chest pain or shortness of breath    #Sigmoid colitis  - CTAP w IV 03/06: Mild circumferential wall thickening of the distal descending and entire sigmoid colon, compatible with colitis  - Continue ciprofloxacin and flagyl IV  - C.diff and GI PCR to be sent if pt has diarrhea    #IDDM  - A1c 11.7% in 1/23, f/u repeat  - Cont insulin regimen    #Asthma  - c/w Symbicort, albuterol PRN  - Wean O2 to target SpO2 > 94%    #HTN  - on home amlodipine 5mg and valsartan 160mg  - Resume amlodipine 5mg qd  - Hold valsartan for now    #Other  DVT PPX: heparin  Diet: renal restricted

## 2023-03-08 NOTE — PROGRESS NOTE ADULT - ASSESSMENT
84 yo F PMHx HTN, DM II, asthma, uterine cancer s/p JUDAH, ESRD on HD M/W/F presented for evaluation of weakness, vomiting, and diarrhea x1 day. Course complicated by hematuria.     Nausea/vomiting/diarrhea  - due to colitis  - c/w cipro, flagyl  -CTAP 03/06: Mild circumferential wall thickening of the distal descending and entire sigmoid colon, compatible with colitis  -monitor electrolytes   -currently without diarrhea---if develops then sent stool cdiff and stool pcr     Hematuria  - case d/w urology, will follow up  - hgb decreased from 10->8 but hgb seems to wax and wane  - CT abdomen and bladder sono suggestive of bladder Ca. Pt and family aware. I had extensive conversation via phone with daughter and two sons with pt at bedside. I inquired if they would want conservative mgmt or to pursue biopsy and tx. they would like to pursue biopsy.  - UA and UCx negative    Troponemia  - likely demand from blood loss in setting of ESRD  - pt denies chest pain  - pt wants to hold of cardiac work up. This might be for best as if pt requires biopsy and surgery pt would not be able to under go it due to DAPT but pt may require cardiac clearance for any future procedures  -cont asa, statin    DM II  - c/w insulin  - uncontrolled, a1c 11.7    HTN  - c/w amlodipine, valsartan    DVT/GI ppx  guarded prognosis    FULL CODE    Progress Note Handoff  Pending (specify): urology f/u  Family discussion: d/w patient and family extensively via phone  Disposition:  Unknown at this time__x______    Total time spent to complete patient's bedside assessment, review medical chart, discuss medical plan of care with covering medical team was more than 52 minutes  with >50% of time spendt face to face with patient, discussion with patient/family and/or coordination of care

## 2023-03-08 NOTE — PROGRESS NOTE ADULT - SUBJECTIVE AND OBJECTIVE BOX
UROLOGY DAILY PROGRESS NOTE    Pt is a 83y Female admitted with colitis & hyperkalemia --  following for bladder neoplasm. Patient seen and examined at bedside this afternoon, visibly anxious. Reports feeling well but is nervous regarding further urologic work-up. Of note, alcala catheter placed by primary team reportedly to determine whether bleeding was vaginal in nature or true hematuria. Patient requesting catheter be removed.    MEDICATIONS  (STANDING):  albuterol    0.083%.. 2.5 milliGRAM(s) Nebulizer every 20 minutes  amLODIPine   Tablet 5 milliGRAM(s) Oral daily  aspirin  chewable 81 milliGRAM(s) Oral daily  atorvastatin 40 milliGRAM(s) Oral at bedtime  brimonidine 0.2% Ophthalmic Solution 1 Drop(s) Both EYES two times a day  budesonide 160 MICROgram(s)/formoterol 4.5 MICROgram(s) Inhaler 2 Puff(s) Inhalation two times a day  calcium acetate 1334 milliGRAM(s) Oral two times a day with meals  ciprofloxacin     Tablet 500 milliGRAM(s) Oral daily  dextrose 5%. 1000 milliLiter(s) (100 mL/Hr) IV Continuous <Continuous>  dextrose 5%. 1000 milliLiter(s) (50 mL/Hr) IV Continuous <Continuous>  dextrose 50% Injectable 25 Gram(s) IV Push once  dextrose 50% Injectable 12.5 Gram(s) IV Push once  dextrose 50% Injectable 25 Gram(s) IV Push once  epoetin shannon-epbx (RETACRIT) Injectable 75759 Unit(s) IV Push <User Schedule>  glucagon  Injectable 1 milliGRAM(s) IntraMuscular once  influenza  Vaccine (HIGH DOSE) 0.7 milliLiter(s) IntraMuscular once  insulin glargine Injectable (LANTUS) 24 Unit(s) SubCutaneous at bedtime  insulin lispro (ADMELOG) corrective regimen sliding scale   SubCutaneous three times a day before meals  insulin lispro Injectable (ADMELOG) 8 Unit(s) SubCutaneous three times a day before meals  latanoprost 0.005% Ophthalmic Solution 1 Drop(s) Both EYES at bedtime  metroNIDAZOLE    Tablet 500 milliGRAM(s) Oral three times a day  pantoprazole    Tablet 40 milliGRAM(s) Oral before breakfast    MEDICATIONS  (PRN):  acetaminophen     Tablet .. 650 milliGRAM(s) Oral every 6 hours PRN Temp greater or equal to 38C (100.4F), Mild Pain (1 - 3)  albuterol    90 MICROgram(s) HFA Inhaler 1 Puff(s) Inhalation every 4 hours PRN Bronchospasm  dextrose Oral Gel 15 Gram(s) Oral once PRN Blood Glucose LESS THAN 70 milliGRAM(s)/deciliter  melatonin 5 milliGRAM(s) Oral at bedtime PRN Insomnia  ondansetron Injectable 4 milliGRAM(s) IV Push every 8 hours PRN Nausea and/or Vomiting    REVIEW OF SYSTEMS   [x] A ten-point review of systems was otherwise negative except as noted.    Vital Signs Last 24 Hrs  T(C): 36.1 (08 Mar 2023 14:18), Max: 36.1 (08 Mar 2023 05:11)  T(F): 97 (08 Mar 2023 14:18), Max: 97 (08 Mar 2023 14:18)  HR: 87 (08 Mar 2023 14:18) (67 - 87)  BP: 116/54 (08 Mar 2023 14:18) (116/54 - 166/72)  RR: 18 (08 Mar 2023 14:18) (18 - 18)  SpO2: 98% (07 Mar 2023 21:15) (98% - 98%)    Parameters below as of 08 Mar 2023 11:30  Patient On (Oxygen Delivery Method): room air    PHYSICAL EXAM:  GEN: Anxious appearing elderly female in NAD, awake and alert.  SKIN: Good color, non diaphoretic.  RESP: Non-labored breathing. No use of accessory muscles.  CARDIO: +S1/S2  ABDO: Soft, obese abdomen, nondistended, nontender to palpation. No palpable bladder, no suprapubic tenderness.  : Indwelling alcala in place, draining clear thin dark bloody urine.     I&O's Summary  07 Mar 2023 07:01  -  08 Mar 2023 07:00  --------------------------------------------------------  IN: 0 mL / OUT: 900 mL / NET: -900 mL    08 Mar 2023 07:01  -  08 Mar 2023 16:17  --------------------------------------------------------  IN: 286 mL / OUT: 2400 mL / NET: -2114 mL    LABS:             8.7    6.23  )-----------( 259      ( 08 Mar 2023 09:30 )             27.4     03-08  130<L>  |  98  |  67<HH>  ----------------------------<  183<H>  6.2<HH>   |  21  |  7.3<HH>    Ca    8.4      08 Mar 2023 09:30  Mg     1.9     03-08    TPro  6.0  /  Alb  3.2<L>  /  TBili  <0.2  /  DBili  x   /  AST  25  /  ALT  26  /  AlkPhos  107  03-08    Urinalysis Basic - ( 07 Mar 2023 10:37 )  Color: Colorless / Appearance: Slightly Turbid / S.008 / pH: x  Gluc: x / Ketone: Negative  / Bili: Negative / Urobili: <2 mg/dL   Blood: x / Protein: 100 mg/dL / Nitrite: Negative   Leuk Esterase: Large / RBC: 262 /HPF /  /HPF   Sq Epi: x / Non Sq Epi: 2 /HPF / Bacteria: Negative      RADIOLOGY & ADDITIONAL STUDIES:  < from: CT Abdomen and Pelvis Urogram w/wo IV Cont (23 @ 19:49) >  FINDINGS:    CHEST:    Lower lung fields: Bibasilar atelectasis.    Patient positioning creates beam Yun artifact.    ABDOMEN/PELVIS:    HEPATOBILIARY: Status post cholecystectomy.    SPLEEN: Unremarkable.    PANCREAS: Unremarkable.    ADRENAL GLANDS: Unremarkable.    KIDNEYS: Again demonstrated are thinned renal cortices bilaterally, left   more than right. No hydronephrosis. Several cyst upper pole left kidney.    ABDOMINOPELVIC NODES: No enlarged lymph nodes.    PELVIC ORGANS: Urinary bladder is diffusely heterogeneous with focal   areas of abnormal enhancement seen centrally and asymmetric on the right.   Significant enhancement inferiorly. Possible adjacent lymph node   posteriorly on the left on series 10, image 76 measuring up to 1.5 cm.    PERITONEUM/MESENTERY/BOWEL: Colonic diverticulosis. Mild decrease in   colonic wall thickening.    BONES/SOFT TISSUES: Degenerative changes. Stable chronic inferior erosive   changes of the T10 vertebral body    Calcified atherosclerotic disease within the abdominal aorta.    IMPRESSION:    On the delayed images, there are diffuse patchy areas of abnormal   enhancement throughout the urinary bladder suspicious for urinary bladder   neoplasm. Adjacent lymph node is noted. Direct visualization is   recommended.    Other findings as described.  < end of copied text >      < from: US Pelvis Complete (23 @ 19:59) >  FINDINGS:    Uterus: Status post hysterectomy.  Right ovary: Not visualized.  Left ovary: Not visualized.  Fluid: None.  Other: 6.2 x 6.2 x 4.4 cm polypoid mass seen in the bladder with a   suggestion of a vascular stalk.    IMPRESSION:  Status post hysterectomy.    Findings suspicious for a bladder malignancy. Recommend urology   evaluation.  < end of copied text >

## 2023-03-08 NOTE — PROGRESS NOTE ADULT - SUBJECTIVE AND OBJECTIVE BOX
JULEE TOLLIVER 83y Female  MRN#: [id number]  Hospital Day: 2d    Pt is currently admitted with the primary diagnosis of hematuria    SUBJECTIVE  Overnight events: Hematuria. Straight cath showed gross hematuria.   Subjective complaints: None. Denies shortness of breath, chest pain, n/v/d, dysuria.    Present Today:   - Colunga:  No [  ], Yes [ x ] : Indication: gross hematuria    - Type of IV Access:       .. CVC/Piccline:  No [ x ], Yes [   ] : Indication:       .. Midline: No [ x ], Yes [   ] : Indication:                                             ----------------------------------------------------------  OBJECTIVE    VITAL SIGNS: Last 24 Hours  T(C): 36.1 (08 Mar 2023 14:18), Max: 36.1 (08 Mar 2023 05:11)  T(F): 97 (08 Mar 2023 14:18), Max: 97 (08 Mar 2023 14:18)  HR: 87 (08 Mar 2023 14:18) (67 - 87)  BP: 116/54 (08 Mar 2023 14:18) (116/54 - 166/72)  BP(mean): --  RR: 18 (08 Mar 2023 14:18) (18 - 18)  SpO2: 98% (07 Mar 2023 21:15) (98% - 98%)      23 @ 07:01  -  23 @ 07:00  --------------------------------------------------------  IN: 0 mL / OUT: 900 mL / NET: -900 mL    23 @ 07:01  -  23 @ 15:10  --------------------------------------------------------  IN: 286 mL / OUT: 2400 mL / NET: -2114 mL      PHYSICAL EXAM:  General: In NAD.  HEENT: Normocephalic, nontraumatic. PERRL.  LUNGS: Clear to auscultation b/l. No wheezes, rales, or rhonchi.  HEART: RRR. No murmurs, rubs, or gallops.  ABDOMEN: Soft, nontender, nondistended. +bowel sounds.  EXT: Pulses palpable x 4. No lower extremity edema.  NEURO: AAOx3.  SKIN: Warm, dry.    PAST MEDICAL & SURGICAL HISTORY  Asthma, unspecified asthma severity, unspecified whether complicated, unspecified whether persistent  Hypertension affecting pregnancy, antepartum  Type 2 diabetes mellitus without complication, with long-term current use of insulin  No significant past surgical history                                            -----------------------------------------------------------  ALLERGIES:  No Known Allergies                                            ------------------------------------------------------------    HOME MEDICATIONS  Home Medications:  albuterol 90 mcg/inh inhalation aerosol: 1 puff(s) inhaled every 4 hours (06 Mar 2023 02:40)  amlodipine-valsartan 5 mg-160 mg oral tablet: 1 tab(s) orally once a day (06 Mar 2023 02:40)  aspirin 81 mg oral tablet: 1 tab(s) orally once a day (06 Mar 2023 02:40)  atorvastatin 40 mg oral tablet: 1 tab(s) orally once a day (at bedtime) (06 Mar 2023 02:40)  BRIMONIDINE TARTRATE  0.15 % SOLN: 1 application to each affected eye 2 times a day (06 Mar 2023 02:40)  CALCIUM ACETATE  667 MG CAPS: 2 cap(s) orally 2 times a day (with meals) (06 Mar 2023 02:40)  EZETIMIBE  10 MG TABS:  (06 Mar 2023 02:40)  LATANOPROST  0.005 % SOLN: 1 drop in each eye at bedtime for glaucoma (06 Mar 2023 02:40)                           MEDICATIONS:  STANDING MEDICATIONS  albuterol    0.083%.. 2.5 milliGRAM(s) Nebulizer every 20 minutes  amLODIPine   Tablet 5 milliGRAM(s) Oral daily  aspirin  chewable 81 milliGRAM(s) Oral daily  atorvastatin 40 milliGRAM(s) Oral at bedtime  brimonidine 0.2% Ophthalmic Solution 1 Drop(s) Both EYES two times a day  budesonide 160 MICROgram(s)/formoterol 4.5 MICROgram(s) Inhaler 2 Puff(s) Inhalation two times a day  calcium acetate 1334 milliGRAM(s) Oral two times a day with meals  ciprofloxacin     Tablet 500 milliGRAM(s) Oral daily  dextrose 5%. 1000 milliLiter(s) IV Continuous <Continuous>  dextrose 5%. 1000 milliLiter(s) IV Continuous <Continuous>  dextrose 50% Injectable 25 Gram(s) IV Push once  dextrose 50% Injectable 12.5 Gram(s) IV Push once  dextrose 50% Injectable 25 Gram(s) IV Push once  epoetin shannon-epbx (RETACRIT) Injectable 61794 Unit(s) IV Push <User Schedule>  glucagon  Injectable 1 milliGRAM(s) IntraMuscular once  heparin   Injectable 5000 Unit(s) SubCutaneous every 8 hours  influenza  Vaccine (HIGH DOSE) 0.7 milliLiter(s) IntraMuscular once  insulin glargine Injectable (LANTUS) 24 Unit(s) SubCutaneous at bedtime  insulin lispro (ADMELOG) corrective regimen sliding scale   SubCutaneous three times a day before meals  insulin lispro Injectable (ADMELOG) 8 Unit(s) SubCutaneous three times a day before meals  latanoprost 0.005% Ophthalmic Solution 1 Drop(s) Both EYES at bedtime  metroNIDAZOLE    Tablet 500 milliGRAM(s) Oral three times a day  pantoprazole    Tablet 40 milliGRAM(s) Oral before breakfast    PRN MEDICATIONS  acetaminophen     Tablet .. 650 milliGRAM(s) Oral every 6 hours PRN  albuterol    90 MICROgram(s) HFA Inhaler 1 Puff(s) Inhalation every 4 hours PRN  dextrose Oral Gel 15 Gram(s) Oral once PRN  melatonin 5 milliGRAM(s) Oral at bedtime PRN  ondansetron Injectable 4 milliGRAM(s) IV Push every 8 hours PRN                                            ------------------------------------------------------------    LABS:                        8.7    6.23  )-----------( 259      ( 08 Mar 2023 09:30 )             27.4     03-08    130<L>  |  98  |  67<HH>  ----------------------------<  183<H>  6.2<HH>   |  21  |  7.3<HH>    Ca    8.4      08 Mar 2023 09:30  Mg     1.9     -08    TPro  6.0  /  Alb  3.2<L>  /  TBili  <0.2  /  DBili  x   /  AST  25  /  ALT  26  /  AlkPhos  107  03-08    Urinalysis Basic - ( 07 Mar 2023 10:37 )    Color: Colorless / Appearance: Slightly Turbid / S.008 / pH: x  Gluc: x / Ketone: Negative  / Bili: Negative / Urobili: <2 mg/dL   Blood: x / Protein: 100 mg/dL / Nitrite: Negative   Leuk Esterase: Large / RBC: 262 /HPF /  /HPF   Sq Epi: x / Non Sq Epi: 2 /HPF / Bacteria: Negative    Troponin T, Serum: 0.16 ng/mL ** (23 @ 09:30)  Troponin T, Serum: 0.15 ng/mL ** (23 @ 17:29)    CARDIAC MARKERS ( 08 Mar 2023 09:30 )  x     / 0.16 ng/mL / x     / x     / x      CARDIAC MARKERS ( 07 Mar 2023 17:29 )  x     / 0.15 ng/mL / x     / x     / x      CARDIAC MARKERS ( 07 Mar 2023 12:20 )  x     / 0.16 ng/mL / x     / x     / x                                                -------------------------------------------------------------  RADIOLOGY:    < from: US Pelvis Complete (23 @ 19:59) >  IMPRESSION:  Status post hysterectomy.  Findings suspicious for a bladder malignancy. Recommend urology   evaluation.      < from: CT Abdomen and Pelvis Urogram w/wo IV Cont (23 @ 19:49) >  IMPRESSION:  On the delayed images, there are diffuse patchy areas of abnormal   enhancement throughout the urinary bladder suspicious for urinary bladder   neoplasm. Adjacent lymph node is noted. Direct visualization is   recommended.    Other findings as described.                                            -------------------------------------------------------------- JULEE TOLLIVER 83y Female  MRN#: [id number]  Hospital Day: 2d    Pt is currently admitted with the primary diagnosis of hematuria    SUBJECTIVE  Overnight events: Hematuria. Straight cath showed gross hematuria.   Subjective complaints: None. Denies shortness of breath, chest pain, n/v/d, dysuria.    Present Today:   - Colunga:  No [  ], Yes [ x ] : Indication: gross hematuria    - Type of IV Access:       .. CVC/Piccline:  No [ x ], Yes [   ] : Indication:       .. Midline: No [ x ], Yes [   ] : Indication:                                             ----------------------------------------------------------  OBJECTIVE    VITAL SIGNS: Last 24 Hours  T(C): 36.1 (08 Mar 2023 14:18), Max: 36.1 (08 Mar 2023 05:11)  T(F): 97 (08 Mar 2023 14:18), Max: 97 (08 Mar 2023 14:18)  HR: 87 (08 Mar 2023 14:18) (67 - 87)  BP: 116/54 (08 Mar 2023 14:18) (116/54 - 166/72)  BP(mean): --  RR: 18 (08 Mar 2023 14:18) (18 - 18)  SpO2: 98% (07 Mar 2023 21:15) (98% - 98%)      23 @ 07:01  -  23 @ 07:00  --------------------------------------------------------  IN: 0 mL / OUT: 900 mL / NET: -900 mL    23 @ 07:01  -  23 @ 15:10  --------------------------------------------------------  IN: 286 mL / OUT: 2400 mL / NET: -2114 mL      PHYSICAL EXAM:  General: In NAD.  HEENT: Normocephalic, nontraumatic. PERRL.  LUNGS: Clear to auscultation b/l. No wheezes, rales, or rhonchi.  HEART: RRR. No murmurs, rubs, or gallops.  ABDOMEN: Soft, nontender, nondistended. +bowel sounds.  EXT: Pulses palpable x 4. No lower extremity edema.  NEURO: AAOx3.  SKIN: Warm, dry.    PAST MEDICAL & SURGICAL HISTORY  Asthma, unspecified asthma severity, unspecified whether complicated, unspecified whether persistent  Hypertension affecting pregnancy, antepartum  Type 2 diabetes mellitus without complication, with long-term current use of insulin  No significant past surgical history                                            -----------------------------------------------------------  ALLERGIES:  No Known Allergies                                            ------------------------------------------------------------    HOME MEDICATIONS  Home Medications:  albuterol 90 mcg/inh inhalation aerosol: 1 puff(s) inhaled every 4 hours (06 Mar 2023 02:40)  amlodipine-valsartan 5 mg-160 mg oral tablet: 1 tab(s) orally once a day (06 Mar 2023 02:40)  aspirin 81 mg oral tablet: 1 tab(s) orally once a day (06 Mar 2023 02:40)  atorvastatin 40 mg oral tablet: 1 tab(s) orally once a day (at bedtime) (06 Mar 2023 02:40)  BRIMONIDINE TARTRATE  0.15 % SOLN: 1 application to each affected eye 2 times a day (06 Mar 2023 02:40)  CALCIUM ACETATE  667 MG CAPS: 2 cap(s) orally 2 times a day (with meals) (06 Mar 2023 02:40)  EZETIMIBE  10 MG TABS:  (06 Mar 2023 02:40)  LATANOPROST  0.005 % SOLN: 1 drop in each eye at bedtime for glaucoma (06 Mar 2023 02:40)                           MEDICATIONS:  STANDING MEDICATIONS  albuterol    0.083%.. 2.5 milliGRAM(s) Nebulizer every 20 minutes  amLODIPine   Tablet 5 milliGRAM(s) Oral daily  aspirin  chewable 81 milliGRAM(s) Oral daily  atorvastatin 40 milliGRAM(s) Oral at bedtime  brimonidine 0.2% Ophthalmic Solution 1 Drop(s) Both EYES two times a day  budesonide 160 MICROgram(s)/formoterol 4.5 MICROgram(s) Inhaler 2 Puff(s) Inhalation two times a day  calcium acetate 1334 milliGRAM(s) Oral two times a day with meals  ciprofloxacin     Tablet 500 milliGRAM(s) Oral daily  dextrose 5%. 1000 milliLiter(s) IV Continuous <Continuous>  dextrose 5%. 1000 milliLiter(s) IV Continuous <Continuous>  dextrose 50% Injectable 25 Gram(s) IV Push once  dextrose 50% Injectable 12.5 Gram(s) IV Push once  dextrose 50% Injectable 25 Gram(s) IV Push once  epoetin shannon-epbx (RETACRIT) Injectable 96177 Unit(s) IV Push <User Schedule>  glucagon  Injectable 1 milliGRAM(s) IntraMuscular once  heparin   Injectable 5000 Unit(s) SubCutaneous every 8 hours  influenza  Vaccine (HIGH DOSE) 0.7 milliLiter(s) IntraMuscular once  insulin glargine Injectable (LANTUS) 24 Unit(s) SubCutaneous at bedtime  insulin lispro (ADMELOG) corrective regimen sliding scale   SubCutaneous three times a day before meals  insulin lispro Injectable (ADMELOG) 8 Unit(s) SubCutaneous three times a day before meals  latanoprost 0.005% Ophthalmic Solution 1 Drop(s) Both EYES at bedtime  metroNIDAZOLE    Tablet 500 milliGRAM(s) Oral three times a day  pantoprazole    Tablet 40 milliGRAM(s) Oral before breakfast    PRN MEDICATIONS  acetaminophen     Tablet .. 650 milliGRAM(s) Oral every 6 hours PRN  albuterol    90 MICROgram(s) HFA Inhaler 1 Puff(s) Inhalation every 4 hours PRN  dextrose Oral Gel 15 Gram(s) Oral once PRN  melatonin 5 milliGRAM(s) Oral at bedtime PRN  ondansetron Injectable 4 milliGRAM(s) IV Push every 8 hours PRN                                            ------------------------------------------------------------    LABS:                        8.7    6.23  )-----------( 259      ( 08 Mar 2023 09:30 )             27.4     03-08    130<L>  |  98  |  67<HH>  ----------------------------<  183<H>  6.2<HH>   |  21  |  7.3<HH>    Ca    8.4      08 Mar 2023 09:30  Mg     1.9     -08    TPro  6.0  /  Alb  3.2<L>  /  TBili  <0.2  /  DBili  x   /  AST  25  /  ALT  26  /  AlkPhos  107  03-08    Urinalysis Basic - ( 07 Mar 2023 10:37 )    Color: Colorless / Appearance: Slightly Turbid / S.008 / pH: x  Gluc: x / Ketone: Negative  / Bili: Negative / Urobili: <2 mg/dL   Blood: x / Protein: 100 mg/dL / Nitrite: Negative   Leuk Esterase: Large / RBC: 262 /HPF /  /HPF   Sq Epi: x / Non Sq Epi: 2 /HPF / Bacteria: Negative    Troponin T, Serum: 0.16 ng/mL ** (23 @ 09:30)  Troponin T, Serum: 0.15 ng/mL ** (23 @ 17:29)    CARDIAC MARKERS ( 08 Mar 2023 09:30 )  x     / 0.16 ng/mL / x     / x     / x      CARDIAC MARKERS ( 07 Mar 2023 17:29 )  x     / 0.15 ng/mL / x     / x     / x      CARDIAC MARKERS ( 07 Mar 2023 12:20 )  x     / 0.16 ng/mL / x     / x     / x                                                -------------------------------------------------------------  RADIOLOGY:    < from: US Pelvis Complete (23 @ 19:59) >  IMPRESSION:  Status post hysterectomy.  Findings suspicious for a bladder malignancy. Recommend urology   evaluation.      < from: CT Abdomen and Pelvis w/ IV Cont (23 @ 00:18) >  IMPRESSION:  Again seen is irregular urinary bladder wall thickening, progressed since   the prior examination which is 4 urinary bladder neoplasm. Direct   visualization is recommended.    Mild circumferential wall thickening of the distal descending and entire   sigmoid colon, compatible with colitis in the appropriate clinical   setting.    < from: CT Abdomen and Pelvis Urogram w/wo IV Cont (23 @ 19:49) >  IMPRESSION:  On the delayed images, there are diffuse patchy areas of abnormal   enhancement throughout the urinary bladder suspicious for urinary bladder   neoplasm. Adjacent lymph node is noted. Direct visualization is   recommended.    Other findings as described.                                            --------------------------------------------------------------

## 2023-03-09 ENCOUNTER — RESULT REVIEW (OUTPATIENT)
Age: 83
End: 2023-03-09

## 2023-03-09 LAB
ALBUMIN SERPL ELPH-MCNC: 3.2 G/DL — LOW (ref 3.5–5.2)
ALP SERPL-CCNC: 105 U/L — SIGNIFICANT CHANGE UP (ref 30–115)
ALT FLD-CCNC: 27 U/L — SIGNIFICANT CHANGE UP (ref 0–41)
ANION GAP SERPL CALC-SCNC: 10 MMOL/L — SIGNIFICANT CHANGE UP (ref 7–14)
ANISOCYTOSIS BLD QL: SIGNIFICANT CHANGE UP
AST SERPL-CCNC: 33 U/L — SIGNIFICANT CHANGE UP (ref 0–41)
BASOPHILS # BLD AUTO: 0.13 K/UL — SIGNIFICANT CHANGE UP (ref 0–0.2)
BASOPHILS NFR BLD AUTO: 1.7 % — HIGH (ref 0–1)
BILIRUB SERPL-MCNC: <0.2 MG/DL — SIGNIFICANT CHANGE UP (ref 0.2–1.2)
BLD GP AB SCN SERPL QL: SIGNIFICANT CHANGE UP
BUN SERPL-MCNC: 34 MG/DL — HIGH (ref 10–20)
CALCIUM SERPL-MCNC: 8.3 MG/DL — LOW (ref 8.4–10.5)
CHLORIDE SERPL-SCNC: 101 MMOL/L — SIGNIFICANT CHANGE UP (ref 98–110)
CO2 SERPL-SCNC: 27 MMOL/L — SIGNIFICANT CHANGE UP (ref 17–32)
CREAT SERPL-MCNC: 5.6 MG/DL — CRITICAL HIGH (ref 0.7–1.5)
EGFR: 7 ML/MIN/1.73M2 — LOW
EOSINOPHIL # BLD AUTO: 0.51 K/UL — SIGNIFICANT CHANGE UP (ref 0–0.7)
EOSINOPHIL NFR BLD AUTO: 6.7 % — SIGNIFICANT CHANGE UP (ref 0–8)
GLUCOSE BLDC GLUCOMTR-MCNC: 128 MG/DL — HIGH (ref 70–99)
GLUCOSE BLDC GLUCOMTR-MCNC: 189 MG/DL — HIGH (ref 70–99)
GLUCOSE BLDC GLUCOMTR-MCNC: 224 MG/DL — HIGH (ref 70–99)
GLUCOSE BLDC GLUCOMTR-MCNC: 77 MG/DL — SIGNIFICANT CHANGE UP (ref 70–99)
GLUCOSE SERPL-MCNC: 67 MG/DL — LOW (ref 70–99)
HCT VFR BLD CALC: 26.7 % — LOW (ref 37–47)
HCT VFR BLD CALC: 27.3 % — LOW (ref 37–47)
HGB BLD-MCNC: 8.6 G/DL — LOW (ref 12–16)
HGB BLD-MCNC: 8.8 G/DL — LOW (ref 12–16)
HYPOCHROMIA BLD QL: SLIGHT — SIGNIFICANT CHANGE UP
LYMPHOCYTES # BLD AUTO: 2.33 K/UL — SIGNIFICANT CHANGE UP (ref 1.2–3.4)
LYMPHOCYTES # BLD AUTO: 30.8 % — SIGNIFICANT CHANGE UP (ref 20.5–51.1)
MACROCYTES BLD QL: SIGNIFICANT CHANGE UP
MAGNESIUM SERPL-MCNC: 1.8 MG/DL — SIGNIFICANT CHANGE UP (ref 1.8–2.4)
MANUAL SMEAR VERIFICATION: SIGNIFICANT CHANGE UP
MCHC RBC-ENTMCNC: 30.3 PG — SIGNIFICANT CHANGE UP (ref 27–31)
MCHC RBC-ENTMCNC: 31.2 PG — HIGH (ref 27–31)
MCHC RBC-ENTMCNC: 31.5 G/DL — LOW (ref 32–37)
MCHC RBC-ENTMCNC: 33 G/DL — SIGNIFICANT CHANGE UP (ref 32–37)
MCV RBC AUTO: 94.7 FL — SIGNIFICANT CHANGE UP (ref 81–99)
MCV RBC AUTO: 96.1 FL — SIGNIFICANT CHANGE UP (ref 81–99)
MONOCYTES # BLD AUTO: 1.19 K/UL — HIGH (ref 0.1–0.6)
MONOCYTES NFR BLD AUTO: 15.8 % — HIGH (ref 1.7–9.3)
NEUTROPHILS # BLD AUTO: 3.34 K/UL — SIGNIFICANT CHANGE UP (ref 1.4–6.5)
NEUTROPHILS NFR BLD AUTO: 44.2 % — SIGNIFICANT CHANGE UP (ref 42.2–75.2)
NRBC # BLD: 0 /100 WBCS — SIGNIFICANT CHANGE UP (ref 0–0)
PHOSPHATE SERPL-MCNC: 4.3 MG/DL — SIGNIFICANT CHANGE UP (ref 2.1–4.9)
PLAT MORPH BLD: NORMAL — SIGNIFICANT CHANGE UP
PLATELET # BLD AUTO: 252 K/UL — SIGNIFICANT CHANGE UP (ref 130–400)
PLATELET # BLD AUTO: 258 K/UL — SIGNIFICANT CHANGE UP (ref 130–400)
POLYCHROMASIA BLD QL SMEAR: SIGNIFICANT CHANGE UP
POTASSIUM SERPL-MCNC: 4.6 MMOL/L — SIGNIFICANT CHANGE UP (ref 3.5–5)
POTASSIUM SERPL-SCNC: 4.6 MMOL/L — SIGNIFICANT CHANGE UP (ref 3.5–5)
PROMYELOCYTES # FLD: 0.8 % — HIGH (ref 0–0)
PROT SERPL-MCNC: 5.8 G/DL — LOW (ref 6–8)
RBC # BLD: 2.82 M/UL — LOW (ref 4.2–5.4)
RBC # BLD: 2.84 M/UL — LOW (ref 4.2–5.4)
RBC # FLD: 15.9 % — HIGH (ref 11.5–14.5)
RBC # FLD: 16 % — HIGH (ref 11.5–14.5)
RBC BLD AUTO: SIGNIFICANT CHANGE UP
SARS-COV-2 RNA SPEC QL NAA+PROBE: SIGNIFICANT CHANGE UP
SODIUM SERPL-SCNC: 138 MMOL/L — SIGNIFICANT CHANGE UP (ref 135–146)
WBC # BLD: 6.15 K/UL — SIGNIFICANT CHANGE UP (ref 4.8–10.8)
WBC # BLD: 7.56 K/UL — SIGNIFICANT CHANGE UP (ref 4.8–10.8)
WBC # FLD AUTO: 6.15 K/UL — SIGNIFICANT CHANGE UP (ref 4.8–10.8)
WBC # FLD AUTO: 7.56 K/UL — SIGNIFICANT CHANGE UP (ref 4.8–10.8)

## 2023-03-09 PROCEDURE — 88112 CYTOPATH CELL ENHANCE TECH: CPT | Mod: 26

## 2023-03-09 PROCEDURE — 99232 SBSQ HOSP IP/OBS MODERATE 35: CPT

## 2023-03-09 PROCEDURE — 99233 SBSQ HOSP IP/OBS HIGH 50: CPT

## 2023-03-09 RX ORDER — INSULIN GLARGINE 100 [IU]/ML
18 INJECTION, SOLUTION SUBCUTANEOUS AT BEDTIME
Refills: 0 | Status: DISCONTINUED | OUTPATIENT
Start: 2023-03-09 | End: 2023-03-10

## 2023-03-09 RX ORDER — INSULIN LISPRO 100/ML
6 VIAL (ML) SUBCUTANEOUS
Refills: 0 | Status: DISCONTINUED | OUTPATIENT
Start: 2023-03-09 | End: 2023-03-10

## 2023-03-09 RX ADMIN — Medication 650 MILLIGRAM(S): at 02:24

## 2023-03-09 RX ADMIN — BRIMONIDINE TARTRATE 1 DROP(S): 2 SOLUTION/ DROPS OPHTHALMIC at 06:33

## 2023-03-09 RX ADMIN — Medication 2: at 12:05

## 2023-03-09 RX ADMIN — Medication 500 MILLIGRAM(S): at 11:45

## 2023-03-09 RX ADMIN — AMLODIPINE BESYLATE 5 MILLIGRAM(S): 2.5 TABLET ORAL at 06:32

## 2023-03-09 RX ADMIN — ATORVASTATIN CALCIUM 40 MILLIGRAM(S): 80 TABLET, FILM COATED ORAL at 22:10

## 2023-03-09 RX ADMIN — BUDESONIDE AND FORMOTEROL FUMARATE DIHYDRATE 2 PUFF(S): 160; 4.5 AEROSOL RESPIRATORY (INHALATION) at 22:19

## 2023-03-09 RX ADMIN — Medication 650 MILLIGRAM(S): at 09:15

## 2023-03-09 RX ADMIN — LATANOPROST 1 DROP(S): 0.05 SOLUTION/ DROPS OPHTHALMIC; TOPICAL at 22:10

## 2023-03-09 RX ADMIN — INSULIN GLARGINE 18 UNIT(S): 100 INJECTION, SOLUTION SUBCUTANEOUS at 22:09

## 2023-03-09 RX ADMIN — Medication 650 MILLIGRAM(S): at 10:56

## 2023-03-09 RX ADMIN — BRIMONIDINE TARTRATE 1 DROP(S): 2 SOLUTION/ DROPS OPHTHALMIC at 17:20

## 2023-03-09 RX ADMIN — Medication 1334 MILLIGRAM(S): at 17:20

## 2023-03-09 RX ADMIN — PANTOPRAZOLE SODIUM 40 MILLIGRAM(S): 20 TABLET, DELAYED RELEASE ORAL at 06:32

## 2023-03-09 RX ADMIN — Medication 500 MILLIGRAM(S): at 22:10

## 2023-03-09 RX ADMIN — BUDESONIDE AND FORMOTEROL FUMARATE DIHYDRATE 2 PUFF(S): 160; 4.5 AEROSOL RESPIRATORY (INHALATION) at 08:32

## 2023-03-09 RX ADMIN — Medication 81 MILLIGRAM(S): at 11:45

## 2023-03-09 RX ADMIN — Medication 650 MILLIGRAM(S): at 23:25

## 2023-03-09 RX ADMIN — Medication 500 MILLIGRAM(S): at 06:32

## 2023-03-09 RX ADMIN — Medication 500 MILLIGRAM(S): at 13:11

## 2023-03-09 RX ADMIN — Medication 1334 MILLIGRAM(S): at 08:32

## 2023-03-09 RX ADMIN — Medication 650 MILLIGRAM(S): at 03:09

## 2023-03-09 NOTE — PROGRESS NOTE ADULT - ASSESSMENT
ESRD on HD MWF, missed Friday  Sigmoid colitis  Irregular bladder wall thickening/mass - concern for malignancy  Hyperkalemia  Anemia of CKD  Hyponatremia  Hyperphosphatemia  Hypocalcemia  DM    Plan:    HD tomorrow: 3 hours, opti 160 dialyzer, 2K bath, 2 L UF  Renally dose antibiotics  Renal diet  Calcium acetate with meals  Urology f/u

## 2023-03-09 NOTE — PROGRESS NOTE ADULT - SUBJECTIVE AND OBJECTIVE BOX
CHIEF COMPLAINT:    Patient is a 83y old  Female who presents with a chief complaint of Hyperkalemia, Colitis (09 Mar 2023 12:26)      INTERVAL HPI/OVERNIGHT EVENTS:    Patient seen and examined at bedside. No acute overnight events occurred.    ROS: Denies SOB, chest pain. All other systems are negative.    Medications:  Standing  albuterol    0.083%.. 2.5 milliGRAM(s) Nebulizer every 20 minutes  amLODIPine   Tablet 5 milliGRAM(s) Oral daily  aspirin  chewable 81 milliGRAM(s) Oral daily  atorvastatin 40 milliGRAM(s) Oral at bedtime  brimonidine 0.2% Ophthalmic Solution 1 Drop(s) Both EYES two times a day  budesonide 160 MICROgram(s)/formoterol 4.5 MICROgram(s) Inhaler 2 Puff(s) Inhalation two times a day  calcium acetate 1334 milliGRAM(s) Oral two times a day with meals  ciprofloxacin     Tablet 500 milliGRAM(s) Oral daily  dextrose 5%. 1000 milliLiter(s) IV Continuous <Continuous>  dextrose 5%. 1000 milliLiter(s) IV Continuous <Continuous>  dextrose 50% Injectable 25 Gram(s) IV Push once  dextrose 50% Injectable 12.5 Gram(s) IV Push once  dextrose 50% Injectable 25 Gram(s) IV Push once  epoetin shannon-epbx (RETACRIT) Injectable 05637 Unit(s) IV Push <User Schedule>  glucagon  Injectable 1 milliGRAM(s) IntraMuscular once  influenza  Vaccine (HIGH DOSE) 0.7 milliLiter(s) IntraMuscular once  insulin glargine Injectable (LANTUS) 18 Unit(s) SubCutaneous at bedtime  insulin lispro (ADMELOG) corrective regimen sliding scale   SubCutaneous three times a day before meals  insulin lispro Injectable (ADMELOG) 6 Unit(s) SubCutaneous three times a day before meals  latanoprost 0.005% Ophthalmic Solution 1 Drop(s) Both EYES at bedtime  metroNIDAZOLE    Tablet 500 milliGRAM(s) Oral three times a day  pantoprazole    Tablet 40 milliGRAM(s) Oral before breakfast    PRN Meds  acetaminophen     Tablet .. 650 milliGRAM(s) Oral every 6 hours PRN  albuterol    90 MICROgram(s) HFA Inhaler 1 Puff(s) Inhalation every 4 hours PRN  dextrose Oral Gel 15 Gram(s) Oral once PRN  melatonin 5 milliGRAM(s) Oral at bedtime PRN  ondansetron Injectable 4 milliGRAM(s) IV Push every 8 hours PRN        Vital Signs:    T(F): 97.9 (23 @ 13:21), Max: 98.3 (23 @ 20:15)  HR: 75 (23 @ 13:21) (75 - 87)  BP: 141/59 (23 @ 13:21) (116/54 - 156/69)  RR: 18 (23 @ 13:21) (18 - 18)  SpO2: --  I&O's Summary    08 Mar 2023 07:01  -  09 Mar 2023 07:00  --------------------------------------------------------  IN: 286 mL / OUT: 2800 mL / NET: -2514 mL    09 Mar 2023 07:01  -  09 Mar 2023 13:40  --------------------------------------------------------  IN: 526 mL / OUT: 410 mL / NET: 116 mL      Daily     Daily Weight in k (09 Mar 2023 05:44)  CAPILLARY BLOOD GLUCOSE      POCT Blood Glucose.: 189 mg/dL (09 Mar 2023 11:45)  POCT Blood Glucose.: 77 mg/dL (09 Mar 2023 07:46)  POCT Blood Glucose.: 116 mg/dL (08 Mar 2023 21:24)  POCT Blood Glucose.: 110 mg/dL (08 Mar 2023 16:54)      PHYSICAL EXAM:  GENERAL:  NAD  SKIN: No rashes or lesions  HEENT: Atraumatic. Normocephalic. Anicteric  NECK:  No JVD.   PULMONARY: Clear to ausculation bilaterally. No wheezing. No rales  CVS: Normal S1, S2. Regular rate and rhythm. No murmurs.  ABDOMEN/GI: Soft, Nontender, Nondistended; Bowel sounds are present  EXTREMITIES:  No edema B/L LE.  NEUROLOGIC:  No motor deficit.  PSYCH: Alert & oriented x 3, normal affect      LABS:                        8.6    6.15  )-----------( 252      ( 09 Mar 2023 07:11 )             27.3         138  |  101  |  34<H>  ----------------------------<  67<L>  4.6   |  27  |  5.6<HH>    Ca    8.3<L>      09 Mar 2023 07:11  Phos  4.3       Mg     1.8         TPro  5.8<L>  /  Alb  3.2<L>  /  TBili  <0.2  /  DBili  x   /  AST  33  /  ALT  27  /  AlkPhos  105        Serum Pro-Brain Natriuretic Peptide: 1813 pg/mL (23 @ 22:36)    Trop 0.16, CKMB --, CK --, 23 @ 09:30  Trop 0.15, CKMB --, CK --, 23 @ 17:29  Trop 0.16, CKMB --, CK --, 23 @ 12:20        RADIOLOGY & ADDITIONAL TESTS:  Imaging or report Personally Reviewed:  [ ] YES  [ ] NO -->no new images    Telemetry reviewed independently - NSR, no acute events  EKG reviewed independently -->no new EKGs    Consultant(s) Notes Reviewed:  [ ] YES  [ ] NO  Care Discussed with Consultants/Other Providers [ ] YES  [ ] NO    Case discussed with resident  Care discussed with pt

## 2023-03-09 NOTE — PHYSICAL THERAPY INITIAL EVALUATION ADULT - NSPTDISCHREC_GEN_A_CORE
Patient reported she has supervision and physical assistance by son and friend at home. Patient plans to apply for home health aide as well./Home PT

## 2023-03-09 NOTE — PROGRESS NOTE ADULT - ASSESSMENT
84 yo F PMHx HTN, DM II, asthma, uterine cancer s/p JUDAH, ESRD on HD M/W/F presented for evaluation of weakness, vomiting, and diarrhea x1 day. Course complicated by hematuria.     Nausea/vomiting/diarrhea  - due to colitis  - c/w cipro, flagyl  -CTAP 03/06: Mild circumferential wall thickening of the distal descending and entire sigmoid colon, compatible with colitis  -monitor electrolytes   -currently without diarrhea---if develops then sent stool cdiff and stool pcr     Hematuria  - as per urology d/c alcala and outpt cystoscopy for biopsy  - hgb decreased from 10->8 but hgb seems to wax and wane  - CT abdomen and bladder sono suggestive of bladder Ca. Pt and family aware.     Troponemia  - likely demand from blood loss in setting of ESRD  - pt denies chest pain  - pt wants to hold of cardiac work up. This might be for best as if pt requires biopsy and surgery pt would not be able to under go it due to DAPT but pt may require cardiac clearance for any future procedures  -cont asa, statin    DM II  - c/w insulin  - uncontrolled, a1c 11.7    HTN  - c/w amlodipine, valsartan    DVT/GI ppx  guarded prognosis    FULL CODE    Progress Note Handoff  Pending (specify): urology f/u  Family discussion: d/w patient regarding TOV  Disposition:  Unknown at this time__x______

## 2023-03-09 NOTE — PROGRESS NOTE ADULT - SUBJECTIVE AND OBJECTIVE BOX
UROLOGY PROGRESS NOTE    Pt is a 83y Female admitted with colitis & hyperkalemia --  following for bladder neoplasm. Patient seen and examined at bedside this afternoon with Dr. Calvillo. Colunga was removed todya afternoon.     MEDICATIONS  (STANDING):  albuterol    0.083%.. 2.5 milliGRAM(s) Nebulizer every 20 minutes  amLODIPine   Tablet 5 milliGRAM(s) Oral daily  aspirin  chewable 81 milliGRAM(s) Oral daily  atorvastatin 40 milliGRAM(s) Oral at bedtime  brimonidine 0.2% Ophthalmic Solution 1 Drop(s) Both EYES two times a day  budesonide 160 MICROgram(s)/formoterol 4.5 MICROgram(s) Inhaler 2 Puff(s) Inhalation two times a day  calcium acetate 1334 milliGRAM(s) Oral two times a day with meals  ciprofloxacin     Tablet 500 milliGRAM(s) Oral daily  dextrose 5%. 1000 milliLiter(s) (100 mL/Hr) IV Continuous <Continuous>  dextrose 5%. 1000 milliLiter(s) (50 mL/Hr) IV Continuous <Continuous>  dextrose 50% Injectable 25 Gram(s) IV Push once  dextrose 50% Injectable 12.5 Gram(s) IV Push once  dextrose 50% Injectable 25 Gram(s) IV Push once  epoetin shannon-epbx (RETACRIT) Injectable 49571 Unit(s) IV Push <User Schedule>  glucagon  Injectable 1 milliGRAM(s) IntraMuscular once  influenza  Vaccine (HIGH DOSE) 0.7 milliLiter(s) IntraMuscular once  insulin glargine Injectable (LANTUS) 18 Unit(s) SubCutaneous at bedtime  insulin lispro (ADMELOG) corrective regimen sliding scale   SubCutaneous three times a day before meals  insulin lispro Injectable (ADMELOG) 6 Unit(s) SubCutaneous three times a day before meals  latanoprost 0.005% Ophthalmic Solution 1 Drop(s) Both EYES at bedtime  metroNIDAZOLE    Tablet 500 milliGRAM(s) Oral three times a day  pantoprazole    Tablet 40 milliGRAM(s) Oral before breakfast    MEDICATIONS  (PRN):  acetaminophen     Tablet .. 650 milliGRAM(s) Oral every 6 hours PRN Temp greater or equal to 38C (100.4F), Mild Pain (1 - 3)  albuterol    90 MICROgram(s) HFA Inhaler 1 Puff(s) Inhalation every 4 hours PRN Bronchospasm  dextrose Oral Gel 15 Gram(s) Oral once PRN Blood Glucose LESS THAN 70 milliGRAM(s)/deciliter  melatonin 5 milliGRAM(s) Oral at bedtime PRN Insomnia  ondansetron Injectable 4 milliGRAM(s) IV Push every 8 hours PRN Nausea and/or Vomiting      REVIEW OF SYSTEMS   [x] A ten-point review of systems was otherwise negative except as noted.      Vital Signs Last 24 Hrs  T(C): 36.6 (09 Mar 2023 13:21), Max: 36.8 (08 Mar 2023 20:15)  T(F): 97.9 (09 Mar 2023 13:21), Max: 98.3 (08 Mar 2023 20:15)  HR: 75 (09 Mar 2023 13:21) (75 - 83)  BP: 141/59 (09 Mar 2023 13:21) (141/59 - 156/69)  RR: 18 (09 Mar 2023 13:21) (18 - 18)    PHYSICAL EXAM:  GEN: NAD, sitting up in chair  SKIN: Good color, non diaphoretic  RESP: Non-labored breathing.   CARDIO: +S1/S2  ABDO: Soft, NT    I&O's Summary    08 Mar 2023 07:01  -  09 Mar 2023 07:00  --------------------------------------------------------  IN: 286 mL / OUT: 2800 mL / NET: -2514 mL    09 Mar 2023 07:01  -  09 Mar 2023 16:44  --------------------------------------------------------  IN: 526 mL / OUT: 410 mL / NET: 116 mL      LABS:                        8.6    6.15  )-----------( 252      ( 09 Mar 2023 07:11 )             27.3     03-09    138  |  101  |  34<H>  ----------------------------<  67<L>  4.6   |  27  |  5.6<HH>    Ca    8.3<L>      09 Mar 2023 07:11  Phos  4.3     03-09  Mg     1.8     03-09    TPro  5.8<L>  /  Alb  3.2<L>  /  TBili  <0.2  /  DBili  x   /  AST  33  /  ALT  27  /  AlkPhos  105  03-09

## 2023-03-09 NOTE — PROGRESS NOTE ADULT - NS ATTEND AMEND GEN_ALL_CORE FT
seen on March 9th  multiple large bladder tumors on CT scan  no hematuria  no complaints with urination seen on March 9th  history of uterine Ca  also ESRD - on HD  multiple large bladder tumors on CT scan  hematuria on presentation  no complaints with urination  no acute  intervention during this visit  can follow with our Uro-oncologist Dr Larson for further workup of bladder tumors

## 2023-03-09 NOTE — PROGRESS NOTE ADULT - SUBJECTIVE AND OBJECTIVE BOX
[place] [facility] FOLLOW UP NOTE  --------------------------------------------------------------------------------  24 hour events/subjective: Patient examined. Appears comfortable.    PAST HISTORY  --------------------------------------------------------------------------------  No significant changes to PMH, PSH, FHx, SHx, unless otherwise noted    ALLERGIES & MEDICATIONS  --------------------------------------------------------------------------------  Allergies    No Known Allergies    Standing Inpatient Medications  albuterol    0.083%.. 2.5 milliGRAM(s) Nebulizer every 20 minutes  amLODIPine   Tablet 5 milliGRAM(s) Oral daily  aspirin  chewable 81 milliGRAM(s) Oral daily  atorvastatin 40 milliGRAM(s) Oral at bedtime  brimonidine 0.2% Ophthalmic Solution 1 Drop(s) Both EYES two times a day  budesonide 160 MICROgram(s)/formoterol 4.5 MICROgram(s) Inhaler 2 Puff(s) Inhalation two times a day  calcium acetate 1334 milliGRAM(s) Oral two times a day with meals  ciprofloxacin     Tablet 500 milliGRAM(s) Oral daily  dextrose 5%. 1000 milliLiter(s) IV Continuous <Continuous>  dextrose 5%. 1000 milliLiter(s) IV Continuous <Continuous>  dextrose 50% Injectable 25 Gram(s) IV Push once  dextrose 50% Injectable 12.5 Gram(s) IV Push once  dextrose 50% Injectable 25 Gram(s) IV Push once  epoetin shannon-epbx (RETACRIT) Injectable 92491 Unit(s) IV Push <User Schedule>  glucagon  Injectable 1 milliGRAM(s) IntraMuscular once  influenza  Vaccine (HIGH DOSE) 0.7 milliLiter(s) IntraMuscular once  insulin glargine Injectable (LANTUS) 18 Unit(s) SubCutaneous at bedtime  insulin lispro (ADMELOG) corrective regimen sliding scale   SubCutaneous three times a day before meals  insulin lispro Injectable (ADMELOG) 6 Unit(s) SubCutaneous three times a day before meals  latanoprost 0.005% Ophthalmic Solution 1 Drop(s) Both EYES at bedtime  metroNIDAZOLE    Tablet 500 milliGRAM(s) Oral three times a day  pantoprazole    Tablet 40 milliGRAM(s) Oral before breakfast    PRN Inpatient Medications  acetaminophen     Tablet .. 650 milliGRAM(s) Oral every 6 hours PRN  albuterol    90 MICROgram(s) HFA Inhaler 1 Puff(s) Inhalation every 4 hours PRN  dextrose Oral Gel 15 Gram(s) Oral once PRN  melatonin 5 milliGRAM(s) Oral at bedtime PRN  ondansetron Injectable 4 milliGRAM(s) IV Push every 8 hours PRN    VITALS/PHYSICAL EXAM  --------------------------------------------------------------------------------  T(C): 36.4 (03-09-23 @ 05:44), Max: 36.8 (03-08-23 @ 20:15)  HR: 77 (03-09-23 @ 05:44) (77 - 87)  BP: 156/69 (03-09-23 @ 05:44) (116/54 - 156/69)  RR: 18 (03-09-23 @ 05:44) (18 - 18)  Height (cm): 162.6 (03-07-23 @ 23:40)  Weight (kg): 94.2 (03-07-23 @ 23:40)  BMI (kg/m2): 35.6 (03-07-23 @ 23:40)  BSA (m2): 1.99 (03-07-23 @ 23:40)    03-08-23 @ 07:01  -  03-09-23 @ 07:00  --------------------------------------------------------  IN: 286 mL / OUT: 2800 mL / NET: -2514 mL    Physical Exam:  	Gen: NAD  	Pulm: CTA B/L  	CV: RRR, S1S2  	Abd: +BS, soft, nontender/nondistended  	: No suprapubic tenderness  	LE: Warm, no edema  	Vascular access: AVF    LABS/STUDIES  --------------------------------------------------------------------------------              8.6    6.15  >-----------<  252      [03-09-23 @ 07:11]              27.3     138  |  101  |  34  ----------------------------<  67      [03-09-23 @ 07:11]  4.6   |  27  |  5.6        Ca     8.3     [03-09-23 @ 07:11]      Mg     1.8     [03-09-23 @ 07:11]      Phos  4.3     [03-09-23 @ 07:11]    TPro  5.8  /  Alb  3.2  /  TBili  <0.2  /  DBili  x   /  AST  33  /  ALT  27  /  AlkPhos  105  [03-09-23 @ 07:11]    Troponin 0.16      [03-08-23 @ 09:30]    Creatinine Trend:  SCr 5.6 [03-09 @ 07:11]  SCr 7.3 [03-08 @ 09:30]  SCr 6.4 [03-07 @ 17:31]  SCr 8.1 [03-06 @ 09:09]  SCr 8.5 [03-05 @ 22:13]    Urinalysis - [03-07-23 @ 10:37]      Color Colorless / Appearance Slightly Turbid / SG 1.008 / pH 8.5      Gluc Negative / Ketone Negative  / Bili Negative / Urobili <2 mg/dL       Blood Moderate / Protein 100 mg/dL / Leuk Est Large / Nitrite Negative       /  / Hyaline 2 / Gran  / Sq Epi  / Non Sq Epi 2 / Bacteria Negative    HbA1c 8.3      [04-13-18 @ 06:02]  Lipid: chol 143, TG 73, HDL 42, LDL --      [03-06-23 @ 09:09]

## 2023-03-09 NOTE — PROGRESS NOTE ADULT - SUBJECTIVE AND OBJECTIVE BOX
JULEE TOLLIVER 83y Female  MRN#: [id number]  Hospital Day: 3d    Pt is currently admitted with the primary diagnosis of hematuria    SUBJECTIVE  Overnight events: Hematuria. Straight cath showed gross hematuria.   Subjective complaints: None. Denies shortness of breath, chest pain, n/v/d, dysuria.    Present Today:   - Colunga:  No [  ], Yes [ x ] : Indication: gross hematuria    - Type of IV Access:       .. CVC/Piccline:  No [ x ], Yes [   ] : Indication:       .. Midline: No [ x ], Yes [   ] : Indication:                                             ----------------------------------------------------------  OBJECTIVE    VITAL SIGNS: Last 24 Hours  T(C): 36.4 (09 Mar 2023 05:44), Max: 36.8 (08 Mar 2023 20:15)  T(F): 97.6 (09 Mar 2023 05:44), Max: 98.3 (08 Mar 2023 20:15)  HR: 77 (09 Mar 2023 05:44) (77 - 87)  BP: 156/69 (09 Mar 2023 05:44) (116/54 - 156/69)  BP(mean): --  RR: 18 (09 Mar 2023 05:44) (18 - 18)  SpO2: --      03-08-23 @ 07:01  -  03-09-23 @ 07:00  --------------------------------------------------------  IN: 286 mL / OUT: 2800 mL / NET: -2514 mL        PHYSICAL EXAM:  General: In NAD.  HEENT: Normocephalic, nontraumatic. PERRL.  LUNGS: Clear to auscultation b/l. No wheezes, rales, or rhonchi.  HEART: RRR. No murmurs, rubs, or gallops.  ABDOMEN: Soft, nontender, nondistended. +bowel sounds.  EXT: Pulses palpable x 4. No lower extremity edema.  NEURO: AAOx3.  SKIN: Warm, dry.    PAST MEDICAL & SURGICAL HISTORY  Asthma, unspecified asthma severity, unspecified whether complicated, unspecified whether persistent  Hypertension affecting pregnancy, antepartum  Type 2 diabetes mellitus without complication, with long-term current use of insulin  No significant past surgical history                                            -----------------------------------------------------------  ALLERGIES:  No Known Allergies                                            ------------------------------------------------------------    HOME MEDICATIONS  Home Medications:  albuterol 90 mcg/inh inhalation aerosol: 1 puff(s) inhaled every 4 hours (06 Mar 2023 02:40)  amlodipine-valsartan 5 mg-160 mg oral tablet: 1 tab(s) orally once a day (06 Mar 2023 02:40)  aspirin 81 mg oral tablet: 1 tab(s) orally once a day (06 Mar 2023 02:40)  atorvastatin 40 mg oral tablet: 1 tab(s) orally once a day (at bedtime) (06 Mar 2023 02:40)  BRIMONIDINE TARTRATE  0.15 % SOLN: 1 application to each affected eye 2 times a day (06 Mar 2023 02:40)  CALCIUM ACETATE  667 MG CAPS: 2 cap(s) orally 2 times a day (with meals) (06 Mar 2023 02:40)  EZETIMIBE  10 MG TABS:  (06 Mar 2023 02:40)  LATANOPROST  0.005 % SOLN: 1 drop in each eye at bedtime for glaucoma (06 Mar 2023 02:40)                           MEDICATIONS:  STANDING MEDICATIONS  albuterol    0.083%.. 2.5 milliGRAM(s) Nebulizer every 20 minutes  amLODIPine   Tablet 5 milliGRAM(s) Oral daily  aspirin  chewable 81 milliGRAM(s) Oral daily  atorvastatin 40 milliGRAM(s) Oral at bedtime  brimonidine 0.2% Ophthalmic Solution 1 Drop(s) Both EYES two times a day  budesonide 160 MICROgram(s)/formoterol 4.5 MICROgram(s) Inhaler 2 Puff(s) Inhalation two times a day  calcium acetate 1334 milliGRAM(s) Oral two times a day with meals  ciprofloxacin     Tablet 500 milliGRAM(s) Oral daily  dextrose 5%. 1000 milliLiter(s) IV Continuous <Continuous>  dextrose 5%. 1000 milliLiter(s) IV Continuous <Continuous>  dextrose 50% Injectable 25 Gram(s) IV Push once  dextrose 50% Injectable 12.5 Gram(s) IV Push once  dextrose 50% Injectable 25 Gram(s) IV Push once  epoetin shannon-epbx (RETACRIT) Injectable 69578 Unit(s) IV Push <User Schedule>  glucagon  Injectable 1 milliGRAM(s) IntraMuscular once  influenza  Vaccine (HIGH DOSE) 0.7 milliLiter(s) IntraMuscular once  insulin glargine Injectable (LANTUS) 24 Unit(s) SubCutaneous at bedtime  insulin lispro (ADMELOG) corrective regimen sliding scale   SubCutaneous three times a day before meals  insulin lispro Injectable (ADMELOG) 8 Unit(s) SubCutaneous three times a day before meals  latanoprost 0.005% Ophthalmic Solution 1 Drop(s) Both EYES at bedtime  metroNIDAZOLE    Tablet 500 milliGRAM(s) Oral three times a day  pantoprazole    Tablet 40 milliGRAM(s) Oral before breakfast    PRN MEDICATIONS  acetaminophen     Tablet .. 650 milliGRAM(s) Oral every 6 hours PRN  albuterol    90 MICROgram(s) HFA Inhaler 1 Puff(s) Inhalation every 4 hours PRN  dextrose Oral Gel 15 Gram(s) Oral once PRN  melatonin 5 milliGRAM(s) Oral at bedtime PRN  ondansetron Injectable 4 milliGRAM(s) IV Push every 8 hours PRN                                            ------------------------------------------------------------  --  LABS:                        8.6    6.15  )-----------( 252      ( 09 Mar 2023 07:11 )             27.3     03-09    138  |  101  |  34<H>  ----------------------------<  67<L>  4.6   |  27  |  5.6<HH>    Ca    8.3<L>      09 Mar 2023 07:11  Phos  4.3     03-09  Mg     1.8     03-09    TPro  5.8<L>  /  Alb  3.2<L>  /  TBili  <0.2  /  DBili  x   /  AST  33  /  ALT  27  /  AlkPhos  105  03-09    CARDIAC MARKERS ( 08 Mar 2023 09:30 )  x     / 0.16 ng/mL / x     / x     / x      CARDIAC MARKERS ( 07 Mar 2023 17:29 )  x     / 0.15 ng/mL / x     / x     / x      CARDIAC MARKERS ( 07 Mar 2023 12:20 )  x     / 0.16 ng/mL / x     / x     / x        CAPILLARY BLOOD GLUCOSE  POCT Blood Glucose.: 77 mg/dL (09 Mar 2023 07:46)  POCT Blood Glucose.: 116 mg/dL (08 Mar 2023 21:24)  POCT Blood Glucose.: 110 mg/dL (08 Mar 2023 16:54)  POCT Blood Glucose.: 96 mg/dL (08 Mar 2023 12:36)                                            -------------------------------------------------------------  RADIOLOGY:    < from: US Pelvis Complete (03.07.23 @ 19:59) >  IMPRESSION:  Status post hysterectomy.    Findings suspicious for a bladder malignancy. Recommend urology   evaluation.      < from: CT Abdomen and Pelvis Urogram w/wo IV Cont (03.07.23 @ 19:49) >  IMPRESSION:    On the delayed images, there are diffuse patchy areas of abnormal   enhancement throughout the urinary bladder suspicious for urinary bladder   neoplasm. Adjacent lymph node is noted. Direct visualization is   recommended.    Other findings as described.                                            --------------------------------------------------------------

## 2023-03-09 NOTE — PROGRESS NOTE ADULT - ASSESSMENT
Pt is a 83y Female admitted with colitis & hyperkalemia --  following for bladder neoplasm.     ·	Patient seen and examined at bedside this afternoon with Dr. Calvillo.   ·	Alcala was removed today afternoon for TOV; f/u bladder scan post-void, if retaining CIC vs replace alcala if patient unable to CIC  ·	E-mailed office regarding follow-up appointment with Dr. Larson to plan for outpatient cystoscopy, TURBT.

## 2023-03-10 ENCOUNTER — TRANSCRIPTION ENCOUNTER (OUTPATIENT)
Age: 83
End: 2023-03-10

## 2023-03-10 VITALS
HEART RATE: 91 BPM | DIASTOLIC BLOOD PRESSURE: 70 MMHG | TEMPERATURE: 97 F | RESPIRATION RATE: 20 BRPM | SYSTOLIC BLOOD PRESSURE: 147 MMHG

## 2023-03-10 LAB
ALBUMIN SERPL ELPH-MCNC: 3.1 G/DL — LOW (ref 3.5–5.2)
ALP SERPL-CCNC: 114 U/L — SIGNIFICANT CHANGE UP (ref 30–115)
ALT FLD-CCNC: 32 U/L — SIGNIFICANT CHANGE UP (ref 0–41)
ANION GAP SERPL CALC-SCNC: 12 MMOL/L — SIGNIFICANT CHANGE UP (ref 7–14)
AST SERPL-CCNC: 50 U/L — HIGH (ref 0–41)
BASOPHILS # BLD AUTO: 0.04 K/UL — SIGNIFICANT CHANGE UP (ref 0–0.2)
BASOPHILS NFR BLD AUTO: 0.7 % — SIGNIFICANT CHANGE UP (ref 0–1)
BILIRUB SERPL-MCNC: <0.2 MG/DL — SIGNIFICANT CHANGE UP (ref 0.2–1.2)
BUN SERPL-MCNC: 47 MG/DL — HIGH (ref 10–20)
CALCIUM SERPL-MCNC: 8.2 MG/DL — LOW (ref 8.4–10.4)
CHLORIDE SERPL-SCNC: 100 MMOL/L — SIGNIFICANT CHANGE UP (ref 98–110)
CO2 SERPL-SCNC: 23 MMOL/L — SIGNIFICANT CHANGE UP (ref 17–32)
CREAT SERPL-MCNC: 7 MG/DL — CRITICAL HIGH (ref 0.7–1.5)
EGFR: 5 ML/MIN/1.73M2 — LOW
EOSINOPHIL # BLD AUTO: 0.39 K/UL — SIGNIFICANT CHANGE UP (ref 0–0.7)
EOSINOPHIL NFR BLD AUTO: 6.5 % — SIGNIFICANT CHANGE UP (ref 0–8)
GLUCOSE BLDC GLUCOMTR-MCNC: 140 MG/DL — HIGH (ref 70–99)
GLUCOSE BLDC GLUCOMTR-MCNC: 150 MG/DL — HIGH (ref 70–99)
GLUCOSE BLDC GLUCOMTR-MCNC: 210 MG/DL — HIGH (ref 70–99)
GLUCOSE SERPL-MCNC: 122 MG/DL — HIGH (ref 70–99)
HCT VFR BLD CALC: 26.4 % — LOW (ref 37–47)
HGB BLD-MCNC: 8.1 G/DL — LOW (ref 12–16)
IMM GRANULOCYTES NFR BLD AUTO: 0.3 % — SIGNIFICANT CHANGE UP (ref 0.1–0.3)
LYMPHOCYTES # BLD AUTO: 1.4 K/UL — SIGNIFICANT CHANGE UP (ref 1.2–3.4)
LYMPHOCYTES # BLD AUTO: 23.2 % — SIGNIFICANT CHANGE UP (ref 20.5–51.1)
MAGNESIUM SERPL-MCNC: 1.8 MG/DL — SIGNIFICANT CHANGE UP (ref 1.8–2.4)
MCHC RBC-ENTMCNC: 29.8 PG — SIGNIFICANT CHANGE UP (ref 27–31)
MCHC RBC-ENTMCNC: 30.7 G/DL — LOW (ref 32–37)
MCV RBC AUTO: 97.1 FL — SIGNIFICANT CHANGE UP (ref 81–99)
MONOCYTES # BLD AUTO: 1.21 K/UL — HIGH (ref 0.1–0.6)
MONOCYTES NFR BLD AUTO: 20 % — HIGH (ref 1.7–9.3)
NEUTROPHILS # BLD AUTO: 2.98 K/UL — SIGNIFICANT CHANGE UP (ref 1.4–6.5)
NEUTROPHILS NFR BLD AUTO: 49.3 % — SIGNIFICANT CHANGE UP (ref 42.2–75.2)
NON-GYNECOLOGICAL CYTOLOGY STUDY: SIGNIFICANT CHANGE UP
NRBC # BLD: 0 /100 WBCS — SIGNIFICANT CHANGE UP (ref 0–0)
PHOSPHATE SERPL-MCNC: 4.7 MG/DL — SIGNIFICANT CHANGE UP (ref 2.1–4.9)
PLATELET # BLD AUTO: 287 K/UL — SIGNIFICANT CHANGE UP (ref 130–400)
POTASSIUM SERPL-MCNC: 4.9 MMOL/L — SIGNIFICANT CHANGE UP (ref 3.5–5)
POTASSIUM SERPL-SCNC: 4.9 MMOL/L — SIGNIFICANT CHANGE UP (ref 3.5–5)
PROT SERPL-MCNC: 5.7 G/DL — LOW (ref 6–8)
RBC # BLD: 2.72 M/UL — LOW (ref 4.2–5.4)
RBC # FLD: 16.2 % — HIGH (ref 11.5–14.5)
SODIUM SERPL-SCNC: 135 MMOL/L — SIGNIFICANT CHANGE UP (ref 135–146)
WBC # BLD: 6.04 K/UL — SIGNIFICANT CHANGE UP (ref 4.8–10.8)
WBC # FLD AUTO: 6.04 K/UL — SIGNIFICANT CHANGE UP (ref 4.8–10.8)

## 2023-03-10 PROCEDURE — 99239 HOSP IP/OBS DSCHRG MGMT >30: CPT

## 2023-03-10 RX ORDER — CIPROFLOXACIN LACTATE 400MG/40ML
1 VIAL (ML) INTRAVENOUS
Qty: 6 | Refills: 0
Start: 2023-03-10 | End: 2023-03-12

## 2023-03-10 RX ORDER — METRONIDAZOLE 500 MG
1 TABLET ORAL
Qty: 9 | Refills: 0
Start: 2023-03-10 | End: 2023-03-12

## 2023-03-10 RX ADMIN — AMLODIPINE BESYLATE 5 MILLIGRAM(S): 2.5 TABLET ORAL at 05:45

## 2023-03-10 RX ADMIN — ERYTHROPOIETIN 10000 UNIT(S): 10000 INJECTION, SOLUTION INTRAVENOUS; SUBCUTANEOUS at 12:11

## 2023-03-10 RX ADMIN — Medication 1334 MILLIGRAM(S): at 09:29

## 2023-03-10 RX ADMIN — Medication 500 MILLIGRAM(S): at 05:45

## 2023-03-10 RX ADMIN — Medication 650 MILLIGRAM(S): at 05:44

## 2023-03-10 RX ADMIN — PANTOPRAZOLE SODIUM 40 MILLIGRAM(S): 20 TABLET, DELAYED RELEASE ORAL at 05:45

## 2023-03-10 RX ADMIN — BRIMONIDINE TARTRATE 1 DROP(S): 2 SOLUTION/ DROPS OPHTHALMIC at 05:45

## 2023-03-10 NOTE — PROGRESS NOTE ADULT - ASSESSMENT
ESRD on HD MWF, missed Friday  Sigmoid colitis  Irregular bladder wall thickening/mass - concern for malignancy  Hyperkalemia  Anemia of CKD  Hyponatremia  Hyperphosphatemia  Hypocalcemia  DM    Plan:    HD today: 3 hours, opti 160 dialyzer, 2K bath, 2 L UF  Renally dose antibiotics  Renal diet  Calcium acetate with meals  Urology f/u

## 2023-03-10 NOTE — DISCHARGE NOTE PROVIDER - NSDCMRMEDTOKEN_GEN_ALL_CORE_FT
albuterol 90 mcg/inh inhalation aerosol: 1 puff(s) inhaled every 4 hours  amlodipine-valsartan 5 mg-160 mg oral tablet: 1 tab(s) orally once a day  aspirin 81 mg oral tablet: 1 tab(s) orally once a day  atorvastatin 40 mg oral tablet: 1 tab(s) orally once a day (at bedtime)  BRIMONIDINE TARTRATE  0.15 % SOLN: 1 application to each affected eye 2 times a day  budesonide-formoterol 160 mcg-4.5 mcg/inh inhalation aerosol: 1 puff(s) inhaled 2 times a day   CALCIUM ACETATE  667 MG CAPS: 2 cap(s) orally 2 times a day (with meals)  Cipro 500 mg oral tablet: 1 tab(s) orally 2 times a day   EZETIMIBE  10 MG TABS:   LATANOPROST  0.005 % SOLN: 1 drop in each eye at bedtime for glaucoma  metroNIDAZOLE 500 mg oral tablet: 1 tab(s) orally every 8 hours   Ozempic 2 mg/1.5 mL (0.25 mg or 0.5 mg dose) subcutaneous solution: 0.5 milligram(s) subcutaneously once a week   pioglitazone 30 mg oral tablet: 1 tab(s) orally once a day   polyethylene glycol 3350 oral powder for reconstitution: 17 gram(s) orally once a day  Toujeo Max SoloStar 300 units/mL subcutaneous solution: 50 unit(s) subcutaneous once a day (at bedtime)

## 2023-03-10 NOTE — DISCHARGE NOTE NURSING/CASE MANAGEMENT/SOCIAL WORK - PATIENT PORTAL LINK FT
You can access the FollowMyHealth Patient Portal offered by [facility] by registering at the following website: [email or web address]. By joining [facility]’s FollowMyHealth portal, you will also be able to view your health information using other applications (apps) compatible with our system.

## 2023-03-10 NOTE — PROGRESS NOTE ADULT - SUBJECTIVE AND OBJECTIVE BOX
[place] [facility] FOLLOW UP NOTE  --------------------------------------------------------------------------------  24 hour events/subjective: Patient examined during HD. Appears comfortable.    PAST HISTORY  --------------------------------------------------------------------------------  No significant changes to PMH, PSH, FHx, SHx, unless otherwise noted    ALLERGIES & MEDICATIONS  --------------------------------------------------------------------------------  Allergies    No Known Allergies    Standing Inpatient Medications  albuterol    0.083%.. 2.5 milliGRAM(s) Nebulizer every 20 minutes  amLODIPine   Tablet 5 milliGRAM(s) Oral daily  aspirin  chewable 81 milliGRAM(s) Oral daily  atorvastatin 40 milliGRAM(s) Oral at bedtime  brimonidine 0.2% Ophthalmic Solution 1 Drop(s) Both EYES two times a day  budesonide 160 MICROgram(s)/formoterol 4.5 MICROgram(s) Inhaler 2 Puff(s) Inhalation two times a day  calcium acetate 1334 milliGRAM(s) Oral two times a day with meals  ciprofloxacin     Tablet 500 milliGRAM(s) Oral daily  dextrose 5%. 1000 milliLiter(s) IV Continuous <Continuous>  dextrose 5%. 1000 milliLiter(s) IV Continuous <Continuous>  dextrose 50% Injectable 25 Gram(s) IV Push once  dextrose 50% Injectable 12.5 Gram(s) IV Push once  dextrose 50% Injectable 25 Gram(s) IV Push once  epoetin shannon-epbx (RETACRIT) Injectable 97825 Unit(s) IV Push <User Schedule>  glucagon  Injectable 1 milliGRAM(s) IntraMuscular once  influenza  Vaccine (HIGH DOSE) 0.7 milliLiter(s) IntraMuscular once  insulin glargine Injectable (LANTUS) 18 Unit(s) SubCutaneous at bedtime  insulin lispro (ADMELOG) corrective regimen sliding scale   SubCutaneous three times a day before meals  insulin lispro Injectable (ADMELOG) 6 Unit(s) SubCutaneous three times a day before meals  latanoprost 0.005% Ophthalmic Solution 1 Drop(s) Both EYES at bedtime  metroNIDAZOLE    Tablet 500 milliGRAM(s) Oral three times a day  pantoprazole    Tablet 40 milliGRAM(s) Oral before breakfast    PRN Inpatient Medications  acetaminophen     Tablet .. 650 milliGRAM(s) Oral every 6 hours PRN  albuterol    90 MICROgram(s) HFA Inhaler 1 Puff(s) Inhalation every 4 hours PRN  dextrose Oral Gel 15 Gram(s) Oral once PRN  melatonin 5 milliGRAM(s) Oral at bedtime PRN  ondansetron Injectable 4 milliGRAM(s) IV Push every 8 hours PRN    VITALS/PHYSICAL EXAM  --------------------------------------------------------------------------------  T(C): 35.9 (03-10-23 @ 07:50), Max: 36.6 (03-09-23 @ 13:21)  HR: 77 (03-10-23 @ 09:40) (63 - 79)  BP: 130/50 (03-10-23 @ 09:40) (130/50 - 148/62)  RR: 18 (03-10-23 @ 09:40) (18 - 18)  SpO2: 98% (03-10-23 @ 00:02) (98% - 98%)    03-09-23 @ 07:01  -  03-10-23 @ 07:00  --------------------------------------------------------  IN: 726 mL / OUT: 510 mL / NET: 216 mL    03-10-23 @ 07:01  -  03-10-23 @ 11:58  --------------------------------------------------------  IN: 0 mL / OUT: 2000 mL / NET: -2000 mL    Physical Exam:  	Gen: NAD  	Pulm: CTA B/L  	CV: RRR, S1S2  	Abd: +BS, soft, nontender/nondistended  	: No suprapubic tenderness  	LE: Warm,  no edema  	Vascular access: AVF    LABS/STUDIES  --------------------------------------------------------------------------------              8.1    6.04  >-----------<  287      [03-10-23 @ 06:52]              26.4     135  |  100  |  47  ----------------------------<  122      [03-10-23 @ 06:52]  4.9   |  23  |  7.0        Ca     8.2     [03-10-23 @ 06:52]      Mg     1.8     [03-10-23 @ 06:52]      Phos  4.7     [03-10-23 @ 06:52]    TPro  5.7  /  Alb  3.1  /  TBili  <0.2  /  DBili  x   /  AST  50  /  ALT  32  /  AlkPhos  114  [03-10-23 @ 06:52]    Creatinine Trend:  SCr 7.0 [03-10 @ 06:52]  SCr 5.6 [03-09 @ 07:11]  SCr 7.3 [03-08 @ 09:30]  SCr 6.4 [03-07 @ 17:31]  SCr 8.1 [03-06 @ 09:09]    Urinalysis - [03-07-23 @ 10:37]      Color Colorless / Appearance Slightly Turbid / SG 1.008 / pH 8.5      Gluc Negative / Ketone Negative  / Bili Negative / Urobili <2 mg/dL       Blood Moderate / Protein 100 mg/dL / Leuk Est Large / Nitrite Negative       /  / Hyaline 2 / Gran  / Sq Epi  / Non Sq Epi 2 / Bacteria Negative    HbA1c 8.3      [04-13-18 @ 06:02]  Lipid: chol 143, TG 73, HDL 42, LDL --      [03-06-23 @ 09:09]

## 2023-03-10 NOTE — PROGRESS NOTE ADULT - PROVIDER SPECIALTY LIST ADULT
Internal Medicine
Urology
Nephrology
Nephrology
Hospitalist
Internal Medicine
Nephrology
Urology
Nephrology
Nephrology

## 2023-03-10 NOTE — DISCHARGE NOTE PROVIDER - HOSPITAL COURSE
83F with PMHx of HTN, DM on insulin, asthma, ESRD on HD M/W/F and PSH of JUDAH and cholecystectomy presented to the ED for weakness, vomiting, and diarrhea x1 day. Pt missed her HD session on Friday for family reasons, and was told to follow up on Monday for her next dialysis session. She was doing well until then morning when she started feeling nauseous and had x2 episodes of NBNB vomiting. She also had 4 episodes of loose stools, no hematochezia/hemetemesis. She also endorsed generalized weakness and mild numbness in her LE. EMS was called her to house and she was saturating 82% on RA so was placed on NRB, satting 100% now. She denies any CP, palpitations, abd pain, fevers, chills, sick contacts, recent travel, or any  symptoms.     In the ED: BP initially 90/42 -> 112/54, HR 30 -> 82, T 97F, saturating 100% on 15L. Labs notable for Na 125, K 6.9, glucose 298, mildly elevated LFTs. VBG pH 7.47 -> 7.28 with lactate up trending to 3. Trop 0.02, BNP 1800, EKG idioventricular rhythm with bifascicular block (old), likely hyperkalemic changes. CT AP shows colitis in descending and sigmoid colon; and urinary bladder wall thickening concerning for neoplasm, visualization recommended.     She received cefepime, flagyl, insulin, albuterol, Ca gluconate, and 2L LR. HR improved from 30s to 80s after hyperK treatment. Nephro consulted for HD. Dialysis was done and her electrolytes came back to normal.   Admitted to telemetry for positive troponin, managed as demand ischemia due to volume loss from diarrhea nausea and hematuria.    She found to have sigmoid colitis, started on Flagyl and ciprofloxacin , will continue for 7 days total.     For her hematuria, CT and US was suggestive of urinary bladder neoplasm, urology was consulted, Colunga's was removed and she is passing urine. Needs to follow up with Dr. Larson as outpatient for cystoscopy and biopsy.     It was recommended for the patient to be discharged to [facility], she refused and wants to go home with home services.     She is stable for discharge    83F with PMHx of HTN, DM on insulin, asthma, ESRD on HD M/W/F and PSH of JUDAH and cholecystectomy presented to the ED for weakness, vomiting, and diarrhea x1 day. Pt missed her HD session on Friday for family reasons, and was told to follow up on Monday for her next dialysis session. She was doing well until then morning when she started feeling nauseous and had x2 episodes of NBNB vomiting. She also had 4 episodes of loose stools, no hematochezia/hemetemesis. She also endorsed generalized weakness and mild numbness in her LE. EMS was called her to house and she was saturating 82% on RA so was placed on NRB, satting 100% now. She denies any CP, palpitations, abd pain, fevers, chills, sick contacts, recent travel, or any  symptoms.     In the ED: BP initially 90/42 -> 112/54, HR 30 -> 82, T 97F, saturating 100% on 15L. Labs notable for Na 125, K 6.9, glucose 298, mildly elevated LFTs. VBG pH 7.47 -> 7.28 with lactate up trending to 3. Trop 0.02, BNP 1800, EKG idioventricular rhythm with bifascicular block (old), likely hyperkalemic changes. CT AP shows colitis in descending and sigmoid colon; and urinary bladder wall thickening concerning for neoplasm, visualization recommended.     She received cefepime, flagyl, insulin, albuterol, Ca gluconate, and 2L LR. HR improved from 30s to 80s after hyperK treatment. Nephro consulted for HD. Dialysis was done and her electrolytes came back to normal.   Admitted to telemetry for positive troponin, managed as demand ischemia due to volume loss from diarrhea nausea and hematuria.    She found to have sigmoid colitis, started on Flagyl and ciprofloxacin , will continue for 7 days total.     For her hematuria, CT and US was suggestive of urinary bladder neoplasm, urology was consulted, Colunga's was removed and she is passing urine. Needs to follow up with Dr. Larson as outpatient for cystoscopy and biopsy.     It was recommended for the patient to be discharged to [facility], she refused and wants to go home with home services.     She is stable for discharge   Patient was discharged with home services.

## 2023-03-10 NOTE — DISCHARGE NOTE PROVIDER - ATTENDING DISCHARGE PHYSICAL EXAMINATION:
Vital Signs Last 24 Hrs  T(F): 97.2 (10 Mar 2023 15:28), Max: 97.8 (09 Mar 2023 19:22)  HR: 91 (10 Mar 2023 15:28) (63 - 91)  BP: 147/70 (10 Mar 2023 15:28) (130/50 - 148/62)  RR: 20 (10 Mar 2023 15:28) (18 - 20)  SpO2: 98% (10 Mar 2023 00:02) (98% - 98%)    PHYSICAL EXAM:  GENERAL: NAD, well-groomed, well-developed  HEAD:  Atraumatic, Normocephalic  EYES: EOMI, conjunctiva and sclera clear  ENMT: Moist mucous membranes, no thrush  NECK: Supple, No JVD  CHEST/LUNG: Clear to auscultation bilaterally, poor air entry, non-labored breathing  HEART: RRR; S1/S2, 2/6 systolic murmur   ABDOMEN: Soft, Nontender, Nondistended; Bowel sounds present  VASCULAR: Normal pulses, Normal capillary refill  EXTREMITIES: No calf tenderness, No cyanosis, No edema  LYMPH: Normal; No lymphadenopathy noted  SKIN: Warm, Intact  PSYCH: Normal mood, Normal affect  NERVOUS SYSTEM:  A/O x3, Good concentration; CN 2-12 intact, No focal deficits

## 2023-03-10 NOTE — DISCHARGE NOTE PROVIDER - NSDCCPCAREPLAN_GEN_ALL_CORE_FT
PRINCIPAL DISCHARGE DIAGNOSIS  Diagnosis: Hyperkalemia  Assessment and Plan of Treatment: You found to have altered electrolytes in the blood because of missing dialysis. Please don't miss dialysis session in the future.   Follow up with your nephrologist for further follow up and management.      SECONDARY DISCHARGE DIAGNOSES  Diagnosis: Acute colitis  Assessment and Plan of Treatment: You found to have infection of the colon requiring IV antibiotics, your symptoms improved after the treatment. Please continue your antibiotics as prescribed.  Please seek medical advice if your symptoms persisted or reoccurred, if you developed any severe pain or fever.    Diagnosis: Mass of urinary bladder  Assessment and Plan of Treatment: You found to have mass in the urinary bladder on imaging. Urology recommended outpatient follow up for further investigation and treatment of it.

## 2023-03-10 NOTE — DISCHARGE NOTE PROVIDER - CARE PROVIDER_API CALL
Ally Larson)  Urology  [address] [place] [address], [address]  [place] [address]  Phone: [phone]  Fax: [phone]  Follow Up Time: 1 week

## 2023-03-10 NOTE — PROGRESS NOTE ADULT - REASON FOR ADMISSION
Hyperkalemia, Colitis

## 2023-03-15 DIAGNOSIS — E11.65 TYPE 2 DIABETES MELLITUS WITH HYPERGLYCEMIA: ICD-10-CM

## 2023-03-15 DIAGNOSIS — E83.39 OTHER DISORDERS OF PHOSPHORUS METABOLISM: ICD-10-CM

## 2023-03-15 DIAGNOSIS — E87.5 HYPERKALEMIA: ICD-10-CM

## 2023-03-15 DIAGNOSIS — E83.51 HYPOCALCEMIA: ICD-10-CM

## 2023-03-15 DIAGNOSIS — R31.9 HEMATURIA, UNSPECIFIED: ICD-10-CM

## 2023-03-15 DIAGNOSIS — I12.0 HYPERTENSIVE CHRONIC KIDNEY DISEASE WITH STAGE 5 CHRONIC KIDNEY DISEASE OR END STAGE RENAL DISEASE: ICD-10-CM

## 2023-03-15 DIAGNOSIS — Z79.82 LONG TERM (CURRENT) USE OF ASPIRIN: ICD-10-CM

## 2023-03-15 DIAGNOSIS — Z79.4 LONG TERM (CURRENT) USE OF INSULIN: ICD-10-CM

## 2023-03-15 DIAGNOSIS — Z79.84 LONG TERM (CURRENT) USE OF ORAL HYPOGLYCEMIC DRUGS: ICD-10-CM

## 2023-03-15 DIAGNOSIS — Z85.43 PERSONAL HISTORY OF MALIGNANT NEOPLASM OF OVARY: ICD-10-CM

## 2023-03-15 DIAGNOSIS — J45.909 UNSPECIFIED ASTHMA, UNCOMPLICATED: ICD-10-CM

## 2023-03-15 DIAGNOSIS — N18.6 END STAGE RENAL DISEASE: ICD-10-CM

## 2023-03-15 DIAGNOSIS — E11.22 TYPE 2 DIABETES MELLITUS WITH DIABETIC CHRONIC KIDNEY DISEASE: ICD-10-CM

## 2023-03-15 DIAGNOSIS — D49.4 NEOPLASM OF UNSPECIFIED BEHAVIOR OF BLADDER: ICD-10-CM

## 2023-03-15 DIAGNOSIS — Z90.722 ACQUIRED ABSENCE OF OVARIES, BILATERAL: ICD-10-CM

## 2023-03-15 DIAGNOSIS — Z99.2 DEPENDENCE ON RENAL DIALYSIS: ICD-10-CM

## 2023-03-15 DIAGNOSIS — E87.20 ACIDOSIS, UNSPECIFIED: ICD-10-CM

## 2023-03-15 DIAGNOSIS — D63.1 ANEMIA IN CHRONIC KIDNEY DISEASE: ICD-10-CM

## 2023-03-15 DIAGNOSIS — Z90.710 ACQUIRED ABSENCE OF BOTH CERVIX AND UTERUS: ICD-10-CM

## 2023-03-15 DIAGNOSIS — Z90.79 ACQUIRED ABSENCE OF OTHER GENITAL ORGAN(S): ICD-10-CM

## 2023-03-15 DIAGNOSIS — I24.8 OTHER FORMS OF ACUTE ISCHEMIC HEART DISEASE: ICD-10-CM

## 2023-03-15 DIAGNOSIS — E87.1 HYPO-OSMOLALITY AND HYPONATREMIA: ICD-10-CM

## 2023-03-15 DIAGNOSIS — A09 INFECTIOUS GASTROENTERITIS AND COLITIS, UNSPECIFIED: ICD-10-CM

## 2023-03-21 ENCOUNTER — APPOINTMENT (OUTPATIENT)
Dept: GERIATRICS | Facility: HOME HEALTH | Age: 83
End: 2023-03-21
Payer: MEDICARE

## 2023-03-21 DIAGNOSIS — E11.22 TYPE 2 DIABETES MELLITUS WITH DIABETIC CHRONIC KIDNEY DISEASE: ICD-10-CM

## 2023-03-21 DIAGNOSIS — I10 ESSENTIAL (PRIMARY) HYPERTENSION: ICD-10-CM

## 2023-03-21 DIAGNOSIS — E11.42 TYPE 2 DIABETES MELLITUS WITH DIABETIC POLYNEUROPATHY: ICD-10-CM

## 2023-03-21 DIAGNOSIS — J45.909 UNSPECIFIED ASTHMA, UNCOMPLICATED: ICD-10-CM

## 2023-03-21 DIAGNOSIS — Z00.00 ENCOUNTER FOR GENERAL ADULT MEDICAL EXAMINATION W/OUT ABNORMAL FINDINGS: ICD-10-CM

## 2023-03-21 DIAGNOSIS — N18.9 CHRONIC KIDNEY DISEASE, UNSPECIFIED: ICD-10-CM

## 2023-03-21 DIAGNOSIS — R53.1 WEAKNESS: ICD-10-CM

## 2023-03-21 DIAGNOSIS — N18.6 END STAGE RENAL DISEASE: ICD-10-CM

## 2023-03-21 PROCEDURE — 99495 TRANSJ CARE MGMT MOD F2F 14D: CPT

## 2023-03-22 PROBLEM — R53.1 GENERALIZED WEAKNESS: Status: ACTIVE | Noted: 2023-03-22

## 2023-03-22 RX ORDER — ALBUTEROL SULFATE 90 UG/1
108 (90 BASE) AEROSOL, METERED RESPIRATORY (INHALATION) 4 TIMES DAILY
Qty: 1 | Refills: 5 | Status: ACTIVE | COMMUNITY
Start: 2018-10-04 | End: 1900-01-01

## 2023-03-23 PROBLEM — N18.6 ESRD (END STAGE RENAL DISEASE): Status: ACTIVE | Noted: 2020-12-07

## 2023-03-23 PROBLEM — E11.42 CONTROLLED DIABETES MELLITUS WITH DIABETIC POLYNEUROPATHY: Status: ACTIVE | Noted: 2019-02-11

## 2023-03-23 PROBLEM — J45.909 ASTHMA: Status: ACTIVE | Noted: 2017-01-19

## 2023-03-23 NOTE — REVIEW OF SYSTEMS
[Muscle Weakness] : muscle weakness [Negative] : ENT [Chest Pain] : no chest pain [Palpitations] : no palpitations [Leg Claudication] : no leg claudication [Shortness Of Breath] : no shortness of breath [Cough] : no cough [Constipation] : no constipation [Dysuria] : no dysuria [Skin Rash] : no skin rash [Headache] : no headache

## 2023-03-23 NOTE — PHYSICAL EXAM
[No Acute Distress] : no acute distress [Normal Sclera/Conjunctiva] : normal sclera/conjunctiva [Normal Outer Ear/Nose] : the ears and nose were normal in appearance [No JVD] : no jugular venous distention [No Respiratory Distress] : no respiratory distress [Clear to Auscultation] : lungs were clear to auscultation bilaterally [Normal S1, S2] : normal S1 and S2 [Non Tender] : non-tender [Soft] : abdomen soft [No Joint Swelling] : no joint swelling seen [No Rash] : no rash [No Gross Sensory Deficits] : no gross sensory deficits [Oriented x3] : oriented to person, place, and time [de-identified] : HD

## 2023-03-23 NOTE — ASSESSMENT
[FreeTextEntry1] : #ESRD\par -HD TIW\par \par #Bladder tumor\par -f/u with urology\par \par #Asthma\par -albuterol\par -budesonide-formoterol 160-4.5mcg\par \par #HTN\par -amlodipine-valsartan 5-160mg\par \par #HLD\par -atorvastatin 40mg\par \par #CAD\par -ASA\par \par #neuropathy\par -gabapentin 300mg TID\par \par #DM\par -Ozempic 2mg/1.5ml weekly\par -actos 30mg \par toujeo 50U daily\par \par c/w all home meds.

## 2023-03-23 NOTE — HISTORY OF PRESENT ILLNESS
[Patient] : patient [Formal Caregiver] : formal caregiver [FreeTextEntry1] : generalized weakness, debility  [FreeTextEntry2] : 82 y/o F w/ PMH of ESRD on HD TIW,  Controlled HTN, DLD on statins, Asthma not in exacerbation, \par patient seen at home for initial eval, daughter at her side. \par patient denies CP, SOB, N/V/D, dysuria. \par as per daughter patient was recently diagnosed with mass in bladder, now she is f/u with urology. \par \par  \par

## 2023-04-18 ENCOUNTER — APPOINTMENT (OUTPATIENT)
Dept: OTOLARYNGOLOGY | Facility: CLINIC | Age: 83
End: 2023-04-18

## 2023-04-27 ENCOUNTER — APPOINTMENT (OUTPATIENT)
Dept: UROLOGY | Facility: CLINIC | Age: 83
End: 2023-04-27

## 2023-09-01 ENCOUNTER — RX RENEWAL (OUTPATIENT)
Age: 83
End: 2023-09-01

## 2023-09-01 RX ORDER — PIOGLITAZONE HYDROCHLORIDE 30 MG/1
30 TABLET ORAL
Qty: 90 | Refills: 0 | Status: ACTIVE | COMMUNITY
Start: 2017-12-11 | End: 1900-01-01

## 2024-01-01 NOTE — ED ADULT TRIAGE NOTE - NSSEPSISSUSPECTED_ED_A_ED
The patient's goals for the shift include      The clinical goals for the shift include pt will have decreased incidences of fecal incontininence    Over the shift, the patient did make progress toward the following goals. Barriers to progression include none. Recommendations to address these barriers include none.     Yes

## 2024-02-02 NOTE — PHYSICAL THERAPY INITIAL EVALUATION ADULT - IMPAIRMENTS FOUND, PT EVAL
Spoke w/ pt to schedule behavior health referral placed by Dr. Karen Og. Pt denied scheduling.       [place], [place] ext [phone]   aerobic capacity/endurance/gait, locomotion, and balance/gross motor/muscle strength

## 2025-03-17 NOTE — ED PROVIDER NOTE - NSFOLLOWUPINSTRUCTIONS_ED_ALL_ED_FT
cardiac optimization FOLLOW UP WITH YOUR DOCTOR THIS WEEK FOR REEVALUATION. [facility]THOPEDICS FOLLOW UP AS WELL.    RETURN TO ED IMMEDIATELY WITH ANY WORSENING SYMPTOMS, CHEST PAIN, SHORTNESS OF BREATH, FEVERS, WEAKNESS, DIZZINESS, PERSISTENT OR SEVERE HEADACHE OR ANY OTHER CONCERNS.